# Patient Record
Sex: FEMALE | Race: WHITE | NOT HISPANIC OR LATINO | Employment: OTHER | URBAN - NONMETROPOLITAN AREA
[De-identification: names, ages, dates, MRNs, and addresses within clinical notes are randomized per-mention and may not be internally consistent; named-entity substitution may affect disease eponyms.]

---

## 2023-03-27 PROBLEM — K21.9 GERD (GASTROESOPHAGEAL REFLUX DISEASE): Status: ACTIVE | Noted: 2023-03-27

## 2023-03-27 PROBLEM — E89.0 HYPOTHYROIDISM, POSTSURGICAL: Status: ACTIVE | Noted: 2023-03-27

## 2023-04-04 LAB
ALANINE AMINOTRANSFERASE (SGPT) (U/L) IN SER/PLAS: 12 U/L (ref 7–45)
ALBUMIN (G/DL) IN SER/PLAS: 3.8 G/DL (ref 3.4–5)
ALKALINE PHOSPHATASE (U/L) IN SER/PLAS: 78 U/L (ref 33–136)
ANION GAP IN SER/PLAS: 9 MMOL/L (ref 10–20)
ASPARTATE AMINOTRANSFERASE (SGOT) (U/L) IN SER/PLAS: 18 U/L (ref 9–39)
BASOPHILS (10*3/UL) IN BLOOD BY AUTOMATED COUNT: 0.04 X10E9/L (ref 0–0.1)
BASOPHILS/100 LEUKOCYTES IN BLOOD BY AUTOMATED COUNT: 0.7 % (ref 0–2)
BILIRUBIN TOTAL (MG/DL) IN SER/PLAS: 1.1 MG/DL (ref 0–1.2)
CALCIDIOL (25 OH VITAMIN D3) (NG/ML) IN SER/PLAS: 19 NG/ML
CALCIUM (MG/DL) IN SER/PLAS: 10 MG/DL (ref 8.6–10.3)
CARBON DIOXIDE, TOTAL (MMOL/L) IN SER/PLAS: 37 MMOL/L (ref 21–32)
CHLORIDE (MMOL/L) IN SER/PLAS: 97 MMOL/L (ref 98–107)
CHOLESTEROL (MG/DL) IN SER/PLAS: 158 MG/DL (ref 0–199)
CHOLESTEROL IN HDL (MG/DL) IN SER/PLAS: 31.2 MG/DL
CHOLESTEROL/HDL RATIO: 5.1
COBALAMIN (VITAMIN B12) (PG/ML) IN SER/PLAS: 389 PG/ML (ref 211–911)
CREATININE (MG/DL) IN SER/PLAS: 0.96 MG/DL (ref 0.5–1.05)
EOSINOPHILS (10*3/UL) IN BLOOD BY AUTOMATED COUNT: 0.18 X10E9/L (ref 0–0.7)
EOSINOPHILS/100 LEUKOCYTES IN BLOOD BY AUTOMATED COUNT: 3.2 % (ref 0–6)
ERYTHROCYTE DISTRIBUTION WIDTH (RATIO) BY AUTOMATED COUNT: 14.1 % (ref 11.5–14.5)
ERYTHROCYTE MEAN CORPUSCULAR HEMOGLOBIN CONCENTRATION (G/DL) BY AUTOMATED: 31.8 G/DL (ref 32–36)
ERYTHROCYTE MEAN CORPUSCULAR VOLUME (FL) BY AUTOMATED COUNT: 95 FL (ref 80–100)
ERYTHROCYTES (10*6/UL) IN BLOOD BY AUTOMATED COUNT: 3.78 X10E12/L (ref 4–5.2)
GFR FEMALE: 64 ML/MIN/1.73M2
GLUCOSE (MG/DL) IN SER/PLAS: 100 MG/DL (ref 74–99)
HEMATOCRIT (%) IN BLOOD BY AUTOMATED COUNT: 35.9 % (ref 36–46)
HEMOGLOBIN (G/DL) IN BLOOD: 11.4 G/DL (ref 12–16)
IMMATURE GRANULOCYTES/100 LEUKOCYTES IN BLOOD BY AUTOMATED COUNT: 0.4 % (ref 0–0.9)
LDL: 97 MG/DL (ref 0–99)
LEUKOCYTES (10*3/UL) IN BLOOD BY AUTOMATED COUNT: 5.7 X10E9/L (ref 4.4–11.3)
LYMPHOCYTES (10*3/UL) IN BLOOD BY AUTOMATED COUNT: 0.97 X10E9/L (ref 1.2–4.8)
LYMPHOCYTES/100 LEUKOCYTES IN BLOOD BY AUTOMATED COUNT: 17.1 % (ref 13–44)
MAGNESIUM (MG/DL) IN SER/PLAS: 1.51 MG/DL (ref 1.6–2.4)
MONOCYTES (10*3/UL) IN BLOOD BY AUTOMATED COUNT: 0.63 X10E9/L (ref 0.1–1)
MONOCYTES/100 LEUKOCYTES IN BLOOD BY AUTOMATED COUNT: 11.1 % (ref 2–10)
NEUTROPHILS (10*3/UL) IN BLOOD BY AUTOMATED COUNT: 3.84 X10E9/L (ref 1.2–7.7)
NEUTROPHILS/100 LEUKOCYTES IN BLOOD BY AUTOMATED COUNT: 67.5 % (ref 40–80)
PLATELETS (10*3/UL) IN BLOOD AUTOMATED COUNT: 174 X10E9/L (ref 150–450)
POTASSIUM (MMOL/L) IN SER/PLAS: 3.3 MMOL/L (ref 3.5–5.3)
PROTEIN TOTAL: 7 G/DL (ref 6.4–8.2)
SODIUM (MMOL/L) IN SER/PLAS: 140 MMOL/L (ref 136–145)
THYROTROPIN (MIU/L) IN SER/PLAS BY DETECTION LIMIT <= 0.05 MIU/L: 4.47 MIU/L (ref 0.44–3.98)
TRIGLYCERIDE (MG/DL) IN SER/PLAS: 149 MG/DL (ref 0–149)
UREA NITROGEN (MG/DL) IN SER/PLAS: 22 MG/DL (ref 6–23)
VLDL: 30 MG/DL (ref 0–40)

## 2023-04-21 PROBLEM — I10 ESSENTIAL (PRIMARY) HYPERTENSION: Status: ACTIVE | Noted: 2023-04-21

## 2023-04-21 PROBLEM — J44.1 COPD WITH EXACERBATION (MULTI): Status: ACTIVE | Noted: 2023-04-21

## 2023-04-21 PROBLEM — G47.33 OBSTRUCTIVE SLEEP APNEA: Status: ACTIVE | Noted: 2023-04-21

## 2023-04-21 PROBLEM — B00.9 HERPES SIMPLEX TYPE 1 INFECTION: Status: ACTIVE | Noted: 2023-04-21

## 2023-04-21 PROBLEM — E66.09 CLASS 2 OBESITY DUE TO EXCESS CALORIES IN ADULT: Status: ACTIVE | Noted: 2023-04-21

## 2023-04-21 PROBLEM — E66.812 CLASS 2 OBESITY DUE TO EXCESS CALORIES IN ADULT: Status: ACTIVE | Noted: 2023-04-21

## 2023-04-21 PROBLEM — N31.8 HYPERACTIVITY OF BLADDER: Status: ACTIVE | Noted: 2023-04-21

## 2023-04-21 PROBLEM — M79.89 SWELLING OF LOWER EXTREMITY: Status: ACTIVE | Noted: 2023-04-21

## 2023-04-21 PROBLEM — E55.9 VITAMIN D DEFICIENCY: Status: ACTIVE | Noted: 2023-04-21

## 2023-04-21 PROBLEM — I71.40 ABDOMINAL AORTIC ANEURYSM, WITHOUT RUPTURE, UNSPECIFIED (CMS-HCC): Status: ACTIVE | Noted: 2023-04-21

## 2023-04-21 PROBLEM — R19.7 DIARRHEA: Status: ACTIVE | Noted: 2023-04-21

## 2023-04-21 PROBLEM — E66.01 MORBID OBESITY (MULTI): Status: ACTIVE | Noted: 2023-04-21

## 2023-04-21 PROBLEM — R09.02 HYPOXIA: Status: ACTIVE | Noted: 2023-04-21

## 2023-04-21 PROBLEM — E87.6 HYPOKALEMIA: Status: ACTIVE | Noted: 2023-04-21

## 2023-04-21 PROBLEM — Z20.822 SUSPECTED COVID-19 VIRUS INFECTION: Status: ACTIVE | Noted: 2023-04-21

## 2023-04-21 PROBLEM — I89.0 LYMPHEDEMA OF BOTH LOWER EXTREMITIES: Status: ACTIVE | Noted: 2023-04-21

## 2023-04-21 PROBLEM — I50.9 CONGESTIVE HEART FAILURE (MULTI): Status: ACTIVE | Noted: 2023-04-21

## 2023-04-21 PROBLEM — M54.50 LOW BACK PAIN: Status: ACTIVE | Noted: 2023-04-21

## 2023-04-21 PROBLEM — G89.18 POST-OPERATIVE PAIN: Status: ACTIVE | Noted: 2023-04-21

## 2023-04-21 PROBLEM — E04.9 GOITER: Status: ACTIVE | Noted: 2023-04-21

## 2023-04-21 PROBLEM — J90 PLEURAL EFFUSION, LEFT: Status: ACTIVE | Noted: 2023-04-21

## 2023-04-21 PROBLEM — I10 BENIGN ESSENTIAL HYPERTENSION: Status: ACTIVE | Noted: 2023-04-21

## 2023-04-21 PROBLEM — D64.89 OTHER SPECIFIED ANEMIAS: Status: ACTIVE | Noted: 2023-04-21

## 2023-04-21 PROBLEM — R39.9 URINARY SYMPTOM OR SIGN: Status: ACTIVE | Noted: 2023-04-21

## 2023-04-21 PROBLEM — R60.0 EDEMA OF LEG: Status: ACTIVE | Noted: 2023-04-21

## 2023-04-21 PROBLEM — M54.9 BACK ARTHRALGIA: Status: ACTIVE | Noted: 2023-04-21

## 2023-04-21 PROBLEM — I50.9 CHF (NYHA CLASS III, ACC/AHA STAGE C) (MULTI): Status: ACTIVE | Noted: 2023-04-21

## 2023-04-21 PROBLEM — E78.00 HYPERCHOLESTEROLEMIA: Status: ACTIVE | Noted: 2023-04-21

## 2023-04-21 PROBLEM — J20.9 ACUTE BRONCHITIS: Status: ACTIVE | Noted: 2023-04-21

## 2023-04-21 PROBLEM — K29.90 GASTRODUODENITIS: Status: ACTIVE | Noted: 2023-04-21

## 2023-04-21 PROBLEM — G62.9 PERIPHERAL NEUROPATHY: Status: ACTIVE | Noted: 2023-04-21

## 2023-04-21 PROBLEM — J30.9 ALLERGIC RHINITIS: Status: ACTIVE | Noted: 2023-04-21

## 2023-04-21 PROBLEM — I48.91 A-FIB (MULTI): Status: ACTIVE | Noted: 2023-04-21

## 2023-04-21 PROBLEM — E04.1 COLLOID THYROID NODULE: Status: ACTIVE | Noted: 2023-04-21

## 2023-04-21 PROBLEM — J96.11 CHRONIC RESPIRATORY FAILURE WITH HYPOXIA (MULTI): Status: ACTIVE | Noted: 2023-04-21

## 2023-04-21 PROBLEM — G47.00 INSOMNIA: Status: ACTIVE | Noted: 2023-04-21

## 2023-04-21 PROBLEM — J32.9 SINUSITIS: Status: ACTIVE | Noted: 2023-04-21

## 2023-04-21 PROBLEM — Z98.890 HISTORY OF RADIOFREQUENCY ABLATION PROCEDURE FOR CARDIAC ARRHYTHMIA: Status: ACTIVE | Noted: 2023-04-21

## 2023-04-21 PROBLEM — H81.10 BPV (BENIGN POSITIONAL VERTIGO): Status: ACTIVE | Noted: 2023-04-21

## 2023-04-21 PROBLEM — J44.9 CHRONIC OBSTRUCTIVE PULMONARY DISEASE (MULTI): Status: ACTIVE | Noted: 2023-04-21

## 2023-04-21 PROBLEM — J18.9 PNEUMONIA: Status: ACTIVE | Noted: 2023-04-21

## 2023-04-21 RX ORDER — BUDESONIDE AND FORMOTEROL FUMARATE DIHYDRATE 160; 4.5 UG/1; UG/1
2 AEROSOL RESPIRATORY (INHALATION) 2 TIMES DAILY
COMMUNITY
Start: 2015-04-13 | End: 2024-05-06 | Stop reason: SDUPTHER

## 2023-04-21 RX ORDER — ATORVASTATIN CALCIUM 40 MG/1
40 TABLET, FILM COATED ORAL DAILY
COMMUNITY
End: 2023-05-16 | Stop reason: SDUPTHER

## 2023-04-21 RX ORDER — FUROSEMIDE 40 MG/1
1 TABLET ORAL 2 TIMES DAILY
COMMUNITY
Start: 2019-02-05 | End: 2023-12-01 | Stop reason: SDUPTHER

## 2023-04-21 RX ORDER — WARFARIN SODIUM 7.5 MG/1
7.5 TABLET ORAL
COMMUNITY
End: 2023-12-01

## 2023-04-21 RX ORDER — CHOLECALCIFEROL (VITAMIN D3) 125 MCG
125 CAPSULE ORAL DAILY
COMMUNITY
Start: 2020-10-13 | End: 2023-04-26 | Stop reason: SDUPTHER

## 2023-04-21 RX ORDER — ALBUTEROL SULFATE 90 UG/1
2 AEROSOL, METERED RESPIRATORY (INHALATION) EVERY 6 HOURS PRN
COMMUNITY
Start: 2013-12-03

## 2023-04-21 RX ORDER — POTASSIUM CHLORIDE 1500 MG/1
20 TABLET, EXTENDED RELEASE ORAL EVERY 12 HOURS
COMMUNITY
End: 2023-04-26 | Stop reason: SDUPTHER

## 2023-04-21 RX ORDER — FERROUS GLUCONATE 324(38)MG
1 TABLET ORAL DAILY
COMMUNITY
End: 2023-08-16

## 2023-04-21 RX ORDER — HYDRALAZINE HYDROCHLORIDE 100 MG/1
100 TABLET, FILM COATED ORAL 3 TIMES DAILY
COMMUNITY
End: 2023-07-20

## 2023-04-21 RX ORDER — AMITRIPTYLINE HYDROCHLORIDE 25 MG/1
25 TABLET, FILM COATED ORAL NIGHTLY
COMMUNITY
End: 2023-08-07

## 2023-04-21 RX ORDER — METOLAZONE 5 MG/1
5 TABLET ORAL
COMMUNITY
Start: 2020-08-13

## 2023-04-21 RX ORDER — DOCUSATE SODIUM 100 MG/1
100 CAPSULE, LIQUID FILLED ORAL 2 TIMES DAILY PRN
Status: ON HOLD | COMMUNITY
Start: 2020-05-28 | End: 2024-01-17 | Stop reason: ENTERED-IN-ERROR

## 2023-04-21 RX ORDER — LOSARTAN POTASSIUM 100 MG/1
100 TABLET ORAL DAILY
COMMUNITY
End: 2023-05-15 | Stop reason: SDUPTHER

## 2023-04-21 RX ORDER — MECLIZINE HYDROCHLORIDE 25 MG/1
25 TABLET ORAL NIGHTLY
COMMUNITY
End: 2023-08-21

## 2023-04-21 RX ORDER — CARVEDILOL 25 MG/1
25 TABLET ORAL
COMMUNITY
End: 2023-05-15 | Stop reason: SDUPTHER

## 2023-04-21 RX ORDER — ACETAMINOPHEN 325 MG/1
325 TABLET ORAL EVERY 4 HOURS PRN
COMMUNITY
Start: 2020-05-13

## 2023-04-21 RX ORDER — ASPIRIN 81 MG/1
81 TABLET ORAL DAILY
COMMUNITY
Start: 2020-05-28

## 2023-04-21 RX ORDER — OXYBUTYNIN CHLORIDE 15 MG/1
15 TABLET, EXTENDED RELEASE ORAL DAILY
COMMUNITY

## 2023-04-26 ENCOUNTER — OFFICE VISIT (OUTPATIENT)
Dept: PRIMARY CARE | Facility: CLINIC | Age: 68
End: 2023-04-26
Payer: MEDICARE

## 2023-04-26 VITALS
DIASTOLIC BLOOD PRESSURE: 80 MMHG | TEMPERATURE: 97 F | SYSTOLIC BLOOD PRESSURE: 136 MMHG | WEIGHT: 293 LBS | OXYGEN SATURATION: 98 % | HEIGHT: 67 IN | BODY MASS INDEX: 45.99 KG/M2 | HEART RATE: 75 BPM

## 2023-04-26 DIAGNOSIS — E66.01 MORBID OBESITY WITH BMI OF 50.0-59.9, ADULT (MULTI): ICD-10-CM

## 2023-04-26 DIAGNOSIS — J96.11 CHRONIC RESPIRATORY FAILURE WITH HYPOXIA (MULTI): ICD-10-CM

## 2023-04-26 DIAGNOSIS — E55.9 VITAMIN D DEFICIENCY: ICD-10-CM

## 2023-04-26 DIAGNOSIS — R53.83 OTHER FATIGUE: ICD-10-CM

## 2023-04-26 DIAGNOSIS — G47.33 OBSTRUCTIVE SLEEP APNEA: ICD-10-CM

## 2023-04-26 DIAGNOSIS — I71.40 ABDOMINAL AORTIC ANEURYSM (AAA) WITHOUT RUPTURE, UNSPECIFIED PART (CMS-HCC): ICD-10-CM

## 2023-04-26 DIAGNOSIS — J44.9 CHRONIC OBSTRUCTIVE PULMONARY DISEASE, UNSPECIFIED COPD TYPE (MULTI): ICD-10-CM

## 2023-04-26 DIAGNOSIS — I50.810 RIGHT-SIDED CONGESTIVE HEART FAILURE, UNSPECIFIED HF CHRONICITY (MULTI): ICD-10-CM

## 2023-04-26 DIAGNOSIS — E83.42 HYPOMAGNESEMIA: ICD-10-CM

## 2023-04-26 DIAGNOSIS — E87.6 HYPOKALEMIA: ICD-10-CM

## 2023-04-26 DIAGNOSIS — E89.0 HYPOTHYROIDISM, POSTSURGICAL: ICD-10-CM

## 2023-04-26 DIAGNOSIS — Z00.00 ROUTINE GENERAL MEDICAL EXAMINATION AT HEALTH CARE FACILITY: Primary | ICD-10-CM

## 2023-04-26 DIAGNOSIS — I48.11 LONGSTANDING PERSISTENT ATRIAL FIBRILLATION (MULTI): ICD-10-CM

## 2023-04-26 DIAGNOSIS — I10 ESSENTIAL (PRIMARY) HYPERTENSION: ICD-10-CM

## 2023-04-26 DIAGNOSIS — Z13.89 SCREENING FOR MULTIPLE CONDITIONS: ICD-10-CM

## 2023-04-26 DIAGNOSIS — E78.00 HYPERCHOLESTEROLEMIA: ICD-10-CM

## 2023-04-26 PROBLEM — R19.7 DIARRHEA: Status: RESOLVED | Noted: 2023-04-21 | Resolved: 2023-04-26

## 2023-04-26 PROCEDURE — G0439 PPPS, SUBSEQ VISIT: HCPCS | Performed by: INTERNAL MEDICINE

## 2023-04-26 PROCEDURE — 1036F TOBACCO NON-USER: CPT | Performed by: INTERNAL MEDICINE

## 2023-04-26 PROCEDURE — G0447 BEHAVIOR COUNSEL OBESITY 15M: HCPCS | Performed by: INTERNAL MEDICINE

## 2023-04-26 PROCEDURE — 1157F ADVNC CARE PLAN IN RCRD: CPT | Performed by: INTERNAL MEDICINE

## 2023-04-26 PROCEDURE — 1159F MED LIST DOCD IN RCRD: CPT | Performed by: INTERNAL MEDICINE

## 2023-04-26 PROCEDURE — 3008F BODY MASS INDEX DOCD: CPT | Performed by: INTERNAL MEDICINE

## 2023-04-26 PROCEDURE — 1170F FXNL STATUS ASSESSED: CPT | Performed by: INTERNAL MEDICINE

## 2023-04-26 PROCEDURE — G0444 DEPRESSION SCREEN ANNUAL: HCPCS | Performed by: INTERNAL MEDICINE

## 2023-04-26 PROCEDURE — 3075F SYST BP GE 130 - 139MM HG: CPT | Performed by: INTERNAL MEDICINE

## 2023-04-26 PROCEDURE — 99214 OFFICE O/P EST MOD 30 MIN: CPT | Performed by: INTERNAL MEDICINE

## 2023-04-26 PROCEDURE — 1160F RVW MEDS BY RX/DR IN RCRD: CPT | Performed by: INTERNAL MEDICINE

## 2023-04-26 PROCEDURE — 3079F DIAST BP 80-89 MM HG: CPT | Performed by: INTERNAL MEDICINE

## 2023-04-26 RX ORDER — CALCIUM CARBONATE/VITAMIN D3 500-10/5ML
400 LIQUID (ML) ORAL
Qty: 180 CAPSULE | Refills: 3 | Status: SHIPPED | OUTPATIENT
Start: 2023-04-26 | End: 2023-07-25

## 2023-04-26 RX ORDER — POTASSIUM CHLORIDE 1500 MG/1
20 TABLET, EXTENDED RELEASE ORAL EVERY 12 HOURS
Qty: 60 TABLET | Refills: 2 | Status: SHIPPED | OUTPATIENT
Start: 2023-04-26 | End: 2024-01-22

## 2023-04-26 RX ORDER — CHOLECALCIFEROL (VITAMIN D3) 125 MCG
125 CAPSULE ORAL DAILY
Qty: 90 CAPSULE | Refills: 3 | Status: SHIPPED | OUTPATIENT
Start: 2023-04-26 | End: 2024-05-01

## 2023-04-26 ASSESSMENT — ENCOUNTER SYMPTOMS
HEADACHES: 0
WHEEZING: 0
PALPITATIONS: 0
OCCASIONAL FEELINGS OF UNSTEADINESS: 0
DIZZINESS: 0
FATIGUE: 0
FEVER: 0
ARTHRALGIAS: 0
BRUISES/BLEEDS EASILY: 0
LOSS OF SENSATION IN FEET: 0
COUGH: 0
ABDOMINAL PAIN: 0
DIARRHEA: 0
DIFFICULTY URINATING: 0
SINUS PAIN: 0
DEPRESSION: 0
UNEXPECTED WEIGHT CHANGE: 0
BLOOD IN STOOL: 0
SORE THROAT: 0

## 2023-04-26 ASSESSMENT — ACTIVITIES OF DAILY LIVING (ADL)
BATHING: INDEPENDENT
MANAGING_FINANCES: NEEDS ASSISTANCE
DRESSING: INDEPENDENT
TAKING_MEDICATION: INDEPENDENT
GROCERY_SHOPPING: NEEDS ASSISTANCE
DOING_HOUSEWORK: INDEPENDENT

## 2023-04-26 ASSESSMENT — PATIENT HEALTH QUESTIONNAIRE - PHQ9
1. LITTLE INTEREST OR PLEASURE IN DOING THINGS: NOT AT ALL
2. FEELING DOWN, DEPRESSED OR HOPELESS: NOT AT ALL
SUM OF ALL RESPONSES TO PHQ9 QUESTIONS 1 AND 2: 0

## 2023-04-26 NOTE — PROGRESS NOTES
Subjective   Reason for Visit: Emma Villela is an 68 y.o. female here for a Medicare Wellness visit.          Reviewed all medications by prescribing practitioner or clinical pharmacist (such as prescriptions, OTCs, herbal therapies and supplements) and documented in the medical record.    - Screening for depression  I spent 15 minutes obtaining and discussing depression screening using PHQ-2 questions with results documented in the chart.  - Morbid obesity  I spent >15minutes minutes face to face with individial providing recommendations for nutrition choices and exercise plan to help achieve weight reduction.  -Mammogram up-to-date  -Vaccinations patient needs to proceed with Shingrix vaccine new prescription provided today  - Screening colonoscopy up-to-date  -  Blood work reviewed  - Hypothyroid seen by endocrinology now takes total to 250 mcg's daily follow-up thyroid function test in 3 months  - Hypomagnesemia start a magnesium tablet daily  - Vitamin D deficiency need to start on vitamin D 5000 daily  - Hypokalemia increase potassium to take twice a day  -CHF compensated  - History of aortic aneurysm compensated controlled no change  -Chronic respiratory failure and COPD continues 2 L oxygen continue with current inhaler recent pulmonary function test no change          Patient Care Team:  Ti Nolan MD as PCP - General  [facility] Nolan [facility]     Review of Systems   Constitutional:  Negative for fatigue, fever and unexpected weight change.   HENT:  Negative for congestion, ear discharge, ear pain, mouth sores, sinus pain and sore throat.    Eyes:  Negative for visual disturbance.   Respiratory:  Negative for cough and wheezing.    Cardiovascular:  Negative for chest pain, palpitations and leg swelling.   Gastrointestinal:  Negative for abdominal pain, blood in stool and diarrhea.   Genitourinary:  Negative for difficulty urinating.   Musculoskeletal:  Negative for  "arthralgias.   Skin:  Negative for rash.   Neurological:  Negative for dizziness and headaches.   Hematological:  Does not bruise/bleed easily.   Psychiatric/Behavioral:  Negative for behavioral problems.    All other systems reviewed and are negative.      Objective   Vitals:  /80   Pulse 75   Temp 36.1 °C (97 °F)   Ht 1.689 m (5' 6.5\")   Wt (!) 151 kg (332 lb)   SpO2 98%   BMI 52.78 kg/m²     Lab Results   Component Value Date    WBC 5.7 04/04/2023    HGB 11.4 (L) 04/04/2023    HCT 35.9 (L) 04/04/2023     04/04/2023    CHOL 158 04/04/2023    TRIG 149 04/04/2023    HDL 31.2 (A) 04/04/2023    ALT 12 04/04/2023    AST 18 04/04/2023     04/04/2023    K 3.3 (L) 04/04/2023    CL 97 (L) 04/04/2023    CREATININE 0.96 04/04/2023    BUN 22 04/04/2023    CO2 37 (H) 04/04/2023    TSH 4.47 (H) 04/04/2023    INR 1.6 (H) 06/22/2020     par   Physical Exam  Vitals and nursing note reviewed.   Constitutional:       Appearance: Normal appearance. She is obese.   HENT:      Head: Normocephalic.      Nose: Nose normal.   Eyes:      Conjunctiva/sclera: Conjunctivae normal.      Pupils: Pupils are equal, round, and reactive to light.   Cardiovascular:      Rate and Rhythm: Regular rhythm.   Pulmonary:      Effort: Pulmonary effort is normal.      Breath sounds: Normal breath sounds.   Abdominal:      General: Abdomen is flat.      Palpations: Abdomen is soft.   Musculoskeletal:      Cervical back: Neck supple.      Right lower leg: Edema present.      Left lower leg: Edema present.   Skin:     General: Skin is warm.   Neurological:      General: No focal deficit present.      Mental Status: She is oriented to person, place, and time.   Psychiatric:         Mood and Affect: Mood normal.         Assessment/Plan   Problem List Items Addressed This Visit          Nervous    Obstructive sleep apnea       Respiratory    Chronic obstructive pulmonary disease (CMS/HCC)    Chronic respiratory failure with hypoxia " (CMS/[facility])       Circulatory    A-fib (CMS/[facility])    Relevant Medications    carvedilol (Coreg) 25 mg tablet    Abdominal aortic aneurysm, without rupture, unspecified (CMS/HCC)    Essential (primary) hypertension    Congestive heart failure (CMS/[facility])    Relevant Medications    carvedilol (Coreg) 25 mg tablet       Endocrine/Metabolic    Hypothyroidism, postsurgical    Vitamin D deficiency    Relevant Medications    cholecalciferol (Vitamin D-3) 125 MCG (5000 UT) capsule       Other    Hypercholesterolemia    Hypokalemia    Relevant Medications    potassium chloride CR (K-Tab) 20 mEq ER tablet    Hypomagnesemia    Relevant Medications    magnesium oxide 400 mg magnesium capsule     Other Visit Diagnoses       Routine general medical examination at health care facility    -  Primary    Relevant Medications    zoster vaccine-recombinant adjuvanted (Shingrix) 50 mcg/0.5 mL vaccine    Other Relevant Orders    1 Year Follow Up In Primary Care - Wellness Exam    Morbid obesity with BMI of 50.0-59.9, adult (CMS/[facility])        Screening for multiple conditions        Other fatigue        Relevant Orders    TSH with reflex to Free T4 if abnormal          - Screening for depression  I spent 15 minutes obtaining and discussing depression screening using PHQ-2 questions with results documented in the chart.  - Morbid obesity  I spent >15minutes minutes face to face with individial providing recommendations for nutrition choices and exercise plan to help achieve weight reduction.  -Mammogram up-to-date  -Vaccinations patient needs to proceed with Shingrix vaccine new prescription provided today  - Screening colonoscopy up-to-date  -  Blood work reviewed  - Hypothyroid seen by endocrinology now takes total to 250 mcg's daily follow-up thyroid function test in 3 months  - Hypomagnesemia start a magnesium tablet daily  - Vitamin D deficiency need to start on vitamin D 5000 daily  - Hypokalemia increase potassium to take twice a day  -CHF  compensated  - History of aortic aneurysm compensated controlled no change  -Chronic respiratory failure and COPD continues 2 L oxygen continue with current inhaler recent pulmonary function test no change

## 2023-04-28 ENCOUNTER — TELEPHONE (OUTPATIENT)
Dept: PRIMARY CARE | Facility: CLINIC | Age: 68
End: 2023-04-28
Payer: MEDICARE

## 2023-04-28 DIAGNOSIS — E87.6 HYPOKALEMIA: ICD-10-CM

## 2023-05-15 DIAGNOSIS — E78.00 HYPERCHOLESTEROLEMIA: ICD-10-CM

## 2023-05-15 DIAGNOSIS — I10 BENIGN ESSENTIAL HYPERTENSION: ICD-10-CM

## 2023-05-15 DIAGNOSIS — E89.0 HYPOTHYROIDISM, POSTSURGICAL: ICD-10-CM

## 2023-05-15 RX ORDER — LOSARTAN POTASSIUM 100 MG/1
TABLET ORAL
Qty: 90 TABLET | Refills: 0 | Status: SHIPPED | OUTPATIENT
Start: 2023-05-15 | End: 2023-08-11

## 2023-05-15 RX ORDER — LEVOTHYROXINE SODIUM 50 UG/1
TABLET ORAL
Qty: 90 TABLET | Refills: 0 | Status: SHIPPED | OUTPATIENT
Start: 2023-05-15 | End: 2023-08-11

## 2023-05-15 RX ORDER — CARVEDILOL 25 MG/1
TABLET ORAL
Qty: 180 TABLET | Refills: 0 | Status: SHIPPED | OUTPATIENT
Start: 2023-05-15 | End: 2023-08-11

## 2023-05-15 RX ORDER — LEVOTHYROXINE SODIUM 50 UG/1
50 TABLET ORAL
COMMUNITY
End: 2023-07-26 | Stop reason: SDUPTHER

## 2023-05-16 RX ORDER — ATORVASTATIN CALCIUM 40 MG/1
TABLET, FILM COATED ORAL
Qty: 90 TABLET | Refills: 0 | Status: SHIPPED | OUTPATIENT
Start: 2023-05-16 | End: 2023-08-16

## 2023-06-27 DIAGNOSIS — K21.9 GASTROESOPHAGEAL REFLUX DISEASE, UNSPECIFIED WHETHER ESOPHAGITIS PRESENT: ICD-10-CM

## 2023-06-27 DIAGNOSIS — E89.0 HYPOTHYROIDISM, POSTSURGICAL: ICD-10-CM

## 2023-06-27 RX ORDER — PANTOPRAZOLE SODIUM 40 MG/1
TABLET, DELAYED RELEASE ORAL
Qty: 90 TABLET | Refills: 0 | Status: SHIPPED | OUTPATIENT
Start: 2023-06-27 | End: 2023-09-25

## 2023-06-27 RX ORDER — LEVOTHYROXINE SODIUM 200 UG/1
TABLET ORAL
Qty: 90 TABLET | Refills: 0 | Status: SHIPPED | OUTPATIENT
Start: 2023-06-27 | End: 2023-09-25

## 2023-07-20 ENCOUNTER — LAB (OUTPATIENT)
Dept: LAB | Facility: LAB | Age: 68
End: 2023-07-20
Payer: MEDICARE

## 2023-07-20 DIAGNOSIS — I10 BENIGN ESSENTIAL HYPERTENSION: ICD-10-CM

## 2023-07-20 DIAGNOSIS — R53.83 OTHER FATIGUE: ICD-10-CM

## 2023-07-20 LAB
THYROTROPIN (MIU/L) IN SER/PLAS BY DETECTION LIMIT <= 0.05 MIU/L: 4.75 MIU/L (ref 0.44–3.98)
THYROXINE (T4) FREE (NG/DL) IN SER/PLAS: 1.15 NG/DL (ref 0.61–1.12)

## 2023-07-20 PROCEDURE — 84443 ASSAY THYROID STIM HORMONE: CPT

## 2023-07-20 PROCEDURE — 84439 ASSAY OF FREE THYROXINE: CPT

## 2023-07-20 PROCEDURE — 36415 COLL VENOUS BLD VENIPUNCTURE: CPT

## 2023-07-20 RX ORDER — HYDRALAZINE HYDROCHLORIDE 100 MG/1
100 TABLET, FILM COATED ORAL 3 TIMES DAILY
Qty: 270 TABLET | Refills: 0 | Status: SHIPPED | OUTPATIENT
Start: 2023-07-20 | End: 2023-12-11 | Stop reason: SDUPTHER

## 2023-07-26 ENCOUNTER — OFFICE VISIT (OUTPATIENT)
Dept: PRIMARY CARE | Facility: CLINIC | Age: 68
End: 2023-07-26
Payer: MEDICARE

## 2023-07-26 VITALS
HEART RATE: 74 BPM | DIASTOLIC BLOOD PRESSURE: 82 MMHG | OXYGEN SATURATION: 96 % | WEIGHT: 293 LBS | SYSTOLIC BLOOD PRESSURE: 134 MMHG | BODY MASS INDEX: 52.22 KG/M2 | TEMPERATURE: 96.8 F

## 2023-07-26 DIAGNOSIS — J96.11 CHRONIC RESPIRATORY FAILURE WITH HYPOXIA (MULTI): ICD-10-CM

## 2023-07-26 DIAGNOSIS — J44.9 CHRONIC OBSTRUCTIVE PULMONARY DISEASE, UNSPECIFIED COPD TYPE (MULTI): ICD-10-CM

## 2023-07-26 DIAGNOSIS — E78.00 HYPERCHOLESTEROLEMIA: ICD-10-CM

## 2023-07-26 DIAGNOSIS — E87.6 HYPOKALEMIA: Primary | ICD-10-CM

## 2023-07-26 DIAGNOSIS — I48.11 LONGSTANDING PERSISTENT ATRIAL FIBRILLATION (MULTI): ICD-10-CM

## 2023-07-26 PROCEDURE — 99214 OFFICE O/P EST MOD 30 MIN: CPT | Performed by: INTERNAL MEDICINE

## 2023-07-26 PROCEDURE — 3075F SYST BP GE 130 - 139MM HG: CPT | Performed by: INTERNAL MEDICINE

## 2023-07-26 PROCEDURE — 1036F TOBACCO NON-USER: CPT | Performed by: INTERNAL MEDICINE

## 2023-07-26 PROCEDURE — 1157F ADVNC CARE PLAN IN RCRD: CPT | Performed by: INTERNAL MEDICINE

## 2023-07-26 PROCEDURE — 3079F DIAST BP 80-89 MM HG: CPT | Performed by: INTERNAL MEDICINE

## 2023-07-26 PROCEDURE — 1159F MED LIST DOCD IN RCRD: CPT | Performed by: INTERNAL MEDICINE

## 2023-07-26 PROCEDURE — 3008F BODY MASS INDEX DOCD: CPT | Performed by: INTERNAL MEDICINE

## 2023-07-26 PROCEDURE — 1160F RVW MEDS BY RX/DR IN RCRD: CPT | Performed by: INTERNAL MEDICINE

## 2023-07-26 PROCEDURE — 1126F AMNT PAIN NOTED NONE PRSNT: CPT | Performed by: INTERNAL MEDICINE

## 2023-07-26 ASSESSMENT — ENCOUNTER SYMPTOMS
UNEXPECTED WEIGHT CHANGE: 0
FEVER: 0
COUGH: 0
WHEEZING: 0
HEADACHES: 0
PALPITATIONS: 0
DIFFICULTY URINATING: 0
ARTHRALGIAS: 0
BLOOD IN STOOL: 0
SORE THROAT: 0
FATIGUE: 0
BRUISES/BLEEDS EASILY: 0
DIARRHEA: 0
ABDOMINAL PAIN: 0
HYPERTENSION: 1
DIZZINESS: 0
SINUS PAIN: 0

## 2023-07-26 NOTE — PROGRESS NOTES
"Subjective   Patient ID: Emma Villela is a 68 y.o. female who presents for Hyperlipidemia, Hypertension, Hypothyroidism, and Results (BW).      - Hypothyroid seen by endocrinology now takes total to 250 mcg's daily follow-up thyroid function test in 3 months  - Hypomagnesemia start a magnesium tablet daily  - Vitamin D deficiency need to start on vitamin D 5000 daily  - Hypokalemia continue with potassium 40 mg twice a day follow-up BMP for further recommendation  -CHF compensated continue low-salt diet  - History of aortic aneurysm compensated controlled no change follow-up vascular surgery  -Chronic respiratory failure and COPD continues 2 L oxygen continue with current inhaler recent pulmonary function test no change            Hyperlipidemia  Pertinent negatives include no chest pain.   Hypertension  Pertinent negatives include no chest pain, headaches or palpitations.          Review of Systems   Constitutional:  Negative for fatigue, fever and unexpected weight change.   HENT:  Negative for congestion, ear discharge, ear pain, mouth sores, sinus pain and sore throat.    Eyes:  Negative for visual disturbance.   Respiratory:  Negative for cough and wheezing.    Cardiovascular:  Negative for chest pain, palpitations and leg swelling.   Gastrointestinal:  Negative for abdominal pain, blood in stool and diarrhea.   Genitourinary:  Negative for difficulty urinating.   Musculoskeletal:  Negative for arthralgias.   Skin:  Negative for rash.   Neurological:  Negative for dizziness and headaches.   Hematological:  Does not bruise/bleed easily.   Psychiatric/Behavioral:  Negative for behavioral problems.    All other systems reviewed and are negative.      Objective   No results found for: \"HGBA1C\"   /82   Pulse 74   Temp 36 °C (96.8 °F)   Wt (!) 151 kg (333 lb 6.4 oz)   SpO2 96%   BMI 52.22 kg/m²   Lab Results   Component Value Date    WBC 5.7 04/04/2023    HGB 11.4 (L) 04/04/2023    HCT 35.9 (L) " 04/04/2023     04/04/2023    CHOL 158 04/04/2023    TRIG 149 04/04/2023    HDL 31.2 (A) 04/04/2023    ALT 12 04/04/2023    AST 18 04/04/2023     04/04/2023    K 3.3 (L) 04/04/2023    CL 97 (L) 04/04/2023    CREATININE 0.96 04/04/2023    BUN 22 04/04/2023    CO2 37 (H) 04/04/2023    TSH 4.75 (H) 07/20/2023    INR 1.6 (H) 06/22/2020     par   Physical Exam  Vitals and nursing note reviewed.   Constitutional:       Appearance: Normal appearance.   HENT:      Head: Normocephalic.      Nose: Nose normal.   Eyes:      Conjunctiva/sclera: Conjunctivae normal.      Pupils: Pupils are equal, round, and reactive to light.   Cardiovascular:      Rate and Rhythm: Regular rhythm.   Pulmonary:      Effort: Pulmonary effort is normal.      Breath sounds: Normal breath sounds.   Abdominal:      General: Abdomen is flat.      Palpations: Abdomen is soft.   Musculoskeletal:      Cervical back: Neck supple.   Skin:     General: Skin is warm.   Neurological:      General: No focal deficit present.      Mental Status: She is oriented to person, place, and time.   Psychiatric:         Mood and Affect: Mood normal.         Assessment/Plan   Emma was seen today for hyperlipidemia, hypertension, hypothyroidism and results.  Diagnoses and all orders for this visit:  Hypokalemia (Primary)  -     Basic metabolic panel; Future  Chronic obstructive pulmonary disease, unspecified COPD type (CMS/HCC)  Longstanding persistent atrial fibrillation (CMS/HCC)  Hypercholesterolemia  Chronic respiratory failure with hypoxia (CMS/HCC)  Other orders  -     Follow Up In Primary Care - Established; Future   - Hypothyroid seen by endocrinology now takes total to 250 mcg's daily follow-up thyroid function test in 3 months  - Hypomagnesemia start a magnesium tablet daily  - Vitamin D deficiency need to start on vitamin D 5000 daily  - Hypokalemia continue with potassium 40 mg twice a day follow-up BMP for further recommendation  -CHF compensated  continue low-salt diet  - History of aortic aneurysm compensated controlled no change follow-up vascular surgery  -Chronic respiratory failure and COPD continues 2 L oxygen continue with current inhaler recent pulmonary function test no change

## 2023-08-07 DIAGNOSIS — K29.90 GASTRODUODENITIS: Primary | ICD-10-CM

## 2023-08-07 RX ORDER — AMITRIPTYLINE HYDROCHLORIDE 25 MG/1
25 TABLET, FILM COATED ORAL NIGHTLY
Qty: 90 TABLET | Refills: 1 | Status: SHIPPED | OUTPATIENT
Start: 2023-08-07 | End: 2024-01-29

## 2023-08-11 DIAGNOSIS — I10 BENIGN ESSENTIAL HYPERTENSION: ICD-10-CM

## 2023-08-11 DIAGNOSIS — E89.0 HYPOTHYROIDISM, POSTSURGICAL: ICD-10-CM

## 2023-08-11 RX ORDER — CARVEDILOL 25 MG/1
25 TABLET ORAL
Qty: 180 TABLET | Refills: 0 | Status: SHIPPED | OUTPATIENT
Start: 2023-08-11 | End: 2023-11-28

## 2023-08-11 RX ORDER — LEVOTHYROXINE SODIUM 50 UG/1
TABLET ORAL
Qty: 90 TABLET | Refills: 0 | Status: SHIPPED | OUTPATIENT
Start: 2023-08-11 | End: 2023-11-10 | Stop reason: SDUPTHER

## 2023-08-11 RX ORDER — LOSARTAN POTASSIUM 100 MG/1
TABLET ORAL
Qty: 90 TABLET | Refills: 0 | Status: SHIPPED | OUTPATIENT
Start: 2023-08-11 | End: 2023-11-10 | Stop reason: SDUPTHER

## 2023-08-15 DIAGNOSIS — E78.00 HYPERCHOLESTEROLEMIA: ICD-10-CM

## 2023-08-15 DIAGNOSIS — D64.89 ANEMIA DUE TO OTHER CAUSE, NOT CLASSIFIED: ICD-10-CM

## 2023-08-16 RX ORDER — FERROUS GLUCONATE 324(38)MG
1 TABLET ORAL DAILY
Qty: 90 TABLET | Refills: 0 | Status: SHIPPED | OUTPATIENT
Start: 2023-08-16 | End: 2023-11-28

## 2023-08-16 RX ORDER — ATORVASTATIN CALCIUM 40 MG/1
TABLET, FILM COATED ORAL
Qty: 90 TABLET | Refills: 0 | Status: SHIPPED | OUTPATIENT
Start: 2023-08-16 | End: 2023-11-10 | Stop reason: SDUPTHER

## 2023-08-21 DIAGNOSIS — H81.10 BENIGN PAROXYSMAL POSITIONAL VERTIGO, UNSPECIFIED LATERALITY: ICD-10-CM

## 2023-08-21 RX ORDER — MECLIZINE HYDROCHLORIDE 25 MG/1
25 TABLET ORAL NIGHTLY
Qty: 30 TABLET | Refills: 2 | Status: SHIPPED | OUTPATIENT
Start: 2023-08-21

## 2023-08-30 RX ORDER — LANOLIN ALCOHOL/MO/W.PET/CERES
1 CREAM (GRAM) TOPICAL
COMMUNITY
Start: 2023-07-24 | End: 2024-05-03 | Stop reason: SDUPTHER

## 2023-09-11 DIAGNOSIS — J44.1 COPD WITH EXACERBATION (MULTI): ICD-10-CM

## 2023-09-25 DIAGNOSIS — I10 HYPERTENSION, UNSPECIFIED TYPE: ICD-10-CM

## 2023-09-25 DIAGNOSIS — E89.0 HYPOTHYROIDISM, POSTSURGICAL: ICD-10-CM

## 2023-09-25 DIAGNOSIS — K21.9 GASTROESOPHAGEAL REFLUX DISEASE, UNSPECIFIED WHETHER ESOPHAGITIS PRESENT: ICD-10-CM

## 2023-09-25 RX ORDER — ISOSORBIDE MONONITRATE 120 MG/1
120 TABLET, EXTENDED RELEASE ORAL DAILY
Qty: 90 TABLET | Refills: 1 | Status: SHIPPED | OUTPATIENT
Start: 2023-09-25 | End: 2024-02-28 | Stop reason: ALTCHOICE

## 2023-09-25 RX ORDER — LEVOTHYROXINE SODIUM 200 UG/1
TABLET ORAL
Qty: 90 TABLET | Refills: 1 | Status: SHIPPED | OUTPATIENT
Start: 2023-09-25 | End: 2024-03-21

## 2023-09-25 RX ORDER — PANTOPRAZOLE SODIUM 40 MG/1
TABLET, DELAYED RELEASE ORAL
Qty: 90 TABLET | Refills: 1 | Status: SHIPPED | OUTPATIENT
Start: 2023-09-25 | End: 2024-03-21

## 2023-10-06 ENCOUNTER — APPOINTMENT (OUTPATIENT)
Dept: VASCULAR SURGERY | Facility: HOSPITAL | Age: 68
End: 2023-10-06
Payer: MEDICARE

## 2023-10-06 ENCOUNTER — HOSPITAL ENCOUNTER (OUTPATIENT)
Dept: VASCULAR MEDICINE | Facility: CLINIC | Age: 68
Discharge: HOME | End: 2023-10-06
Payer: MEDICARE

## 2023-10-06 ENCOUNTER — TELEMEDICINE (OUTPATIENT)
Dept: VASCULAR SURGERY | Facility: HOSPITAL | Age: 68
End: 2023-10-06
Payer: MEDICARE

## 2023-10-06 DIAGNOSIS — I71.40 ABDOMINAL AORTIC ANEURYSM, WITHOUT RUPTURE, UNSPECIFIED (CMS-HCC): ICD-10-CM

## 2023-10-06 DIAGNOSIS — I71.43 INFRARENAL ABDOMINAL AORTIC ANEURYSM (AAA) WITHOUT RUPTURE (CMS-HCC): Primary | ICD-10-CM

## 2023-10-06 PROCEDURE — 93978 VASCULAR STUDY: CPT | Performed by: INTERNAL MEDICINE

## 2023-10-06 PROCEDURE — 93978 VASCULAR STUDY: CPT

## 2023-10-06 PROCEDURE — 99212 OFFICE O/P EST SF 10 MIN: CPT | Mod: 95 | Performed by: SURGERY

## 2023-10-06 PROCEDURE — 99212 OFFICE O/P EST SF 10 MIN: CPT | Performed by: SURGERY

## 2023-10-06 NOTE — PROGRESS NOTES
I had a telephone visit with this patient for follow-up of endovascular repair of abdominal aortic aneurysm that she underwent on May 16, 2016 by Dr. Mansoor Aleman.  The aneurysm sac continues to expand for which she underwent  ligation of lumbar vessels and sac evacuation by Dr. Pearce on May 19, 2020.  However the aneurysm sac was noted to be increasing continuously.  On today's examination her aortic sac measures 6.2 x 7.4 cm.    She denies any abdominal or back pain that may be attributable to the aneurysm.    She is morbidly obese with body mass index of 52.2.    I recommended that she be evaluated by a CTA of abdomen and pelvis for us to better assess the size of the aneurysm and the presence or absence of endoleak.    She was also counseled to engage in weight loss program in preparation of possible open conversion.    She is to call my office when CT scan has been performed.

## 2023-10-09 DIAGNOSIS — I71.40 ABDOMINAL AORTIC ANEURYSM (AAA) WITHOUT RUPTURE, UNSPECIFIED PART (CMS-HCC): Primary | ICD-10-CM

## 2023-10-17 ENCOUNTER — APPOINTMENT (OUTPATIENT)
Dept: RADIOLOGY | Facility: HOSPITAL | Age: 68
End: 2023-10-17
Payer: MEDICARE

## 2023-10-18 ENCOUNTER — LAB (OUTPATIENT)
Dept: LAB | Facility: LAB | Age: 68
End: 2023-10-18
Payer: MEDICARE

## 2023-10-18 DIAGNOSIS — I71.40 ABDOMINAL AORTIC ANEURYSM (AAA) WITHOUT RUPTURE, UNSPECIFIED PART (CMS-HCC): ICD-10-CM

## 2023-10-18 LAB
CREAT SERPL-MCNC: 0.85 MG/DL (ref 0.5–1.05)
GFR SERPL CREATININE-BSD FRML MDRD: 74 ML/MIN/1.73M*2

## 2023-10-18 PROCEDURE — 36415 COLL VENOUS BLD VENIPUNCTURE: CPT

## 2023-10-19 ENCOUNTER — TELEPHONE (OUTPATIENT)
Dept: PHARMACY | Facility: HOSPITAL | Age: 68
End: 2023-10-19
Payer: MEDICARE

## 2023-10-19 ENCOUNTER — APPOINTMENT (OUTPATIENT)
Dept: RADIOLOGY | Facility: HOSPITAL | Age: 68
End: 2023-10-19
Payer: MEDICARE

## 2023-10-19 NOTE — TELEPHONE ENCOUNTER
SUBJECTIVE    Emma Villela is a 68 y.o. female who is enrolled in the [facility] for anticoagulation management.     Referring Physician: Dr. Ti Nolan   INR Goal: 2.0-3.0  Anticoagulation Medication: warfarin  Indication: atrial fibrillation    Bleeding signs/symptoms: no  Bruising: no  Major bleeding event: no  Thrombosis signs/symptoms: no  Thromboembolic event: no  Missed doses: no  Extra doses: no  Medication changes: no  Dietary changes: less greens than normal   Change in health: no  Change in activity: no  Alcohol: no  Other concerns: None     Upcoming Surgeries:  Does the Patient Have any upcoming surgeries that require interruption in anticoagulation therapy? no  Does the patient require bridging? no    OBJECTIVE  INR Today: 3.6  Previous INR: 3.0        ASSESSMENT AND PLAN     Tablet strength: 7.5 mg   Current dose: 1 tablet by mouth daily  Weekly dose: 52.5 mg/week     Current INR is Supratherapeutic. Previous INR was therapeutic at 3.0. Dietary changes over the past week likely contributing to supratherapeutic level. Patient plans to resume normal intake of vitamin K. No refills needed at this time.     Plan:  Hold warfarin x1 dose, then resume previous warfarin regimen of 7.5 mg every day.      Follow Up: INR check at home in one week.     Katja Chisholm, PharmD, BCPS

## 2023-10-23 ENCOUNTER — HOSPITAL ENCOUNTER (OUTPATIENT)
Dept: RADIOLOGY | Facility: HOSPITAL | Age: 68
Discharge: HOME | End: 2023-10-23
Payer: MEDICARE

## 2023-10-23 DIAGNOSIS — I71.40 ABDOMINAL AORTIC ANEURYSM (AAA) WITHOUT RUPTURE, UNSPECIFIED PART (CMS-HCC): ICD-10-CM

## 2023-10-23 PROCEDURE — 2550000001 HC RX 255 CONTRASTS: Performed by: NURSE PRACTITIONER

## 2023-10-23 PROCEDURE — 74174 CTA ABD&PLVS W/CONTRAST: CPT

## 2023-10-23 PROCEDURE — 75635 CT ANGIO ABDOMINAL ARTERIES: CPT | Performed by: RADIOLOGY

## 2023-10-23 RX ADMIN — IOHEXOL 100 ML: 350 INJECTION, SOLUTION INTRAVENOUS at 12:08

## 2023-11-01 ENCOUNTER — TELEMEDICINE (OUTPATIENT)
Dept: VASCULAR SURGERY | Facility: HOSPITAL | Age: 68
End: 2023-11-01
Payer: MEDICARE

## 2023-11-01 DIAGNOSIS — I71.40 ABDOMINAL AORTIC ANEURYSM (AAA) WITHOUT RUPTURE, UNSPECIFIED PART (CMS-HCC): Primary | ICD-10-CM

## 2023-11-01 PROCEDURE — 99212 OFFICE O/P EST SF 10 MIN: CPT | Mod: 95 | Performed by: NURSE PRACTITIONER

## 2023-11-01 PROCEDURE — 99212 OFFICE O/P EST SF 10 MIN: CPT | Performed by: NURSE PRACTITIONER

## 2023-11-02 ENCOUNTER — ANTICOAGULATION - WARFARIN VISIT (OUTPATIENT)
Dept: PHARMACY | Facility: HOSPITAL | Age: 68
End: 2023-11-02
Payer: MEDICARE

## 2023-11-02 LAB
INR IN PPP BY COAGULATION ASSAY EXTERNAL: 2.6 (ref 2–3)
PROTHROMBIN TIME (PT) IN PPP BY COAGULATION ASSAY EXTERNAL: ABNORMAL SECONDS

## 2023-11-02 NOTE — PROGRESS NOTES
SUBJECTIVE    Emma Villela is a 68 y.o. female who is enrolled in the [facility] for anticoagulation management.     Referring Physician: Dr. Ti Nolan  INR Goal: 2.0-3.0  Anticoagulation Medication: Jantoven  Indication: Atrial Fibrillation/Atrial Flutter      OBJECTIVE  INR Today: 2.6  Previous INR: 2.3        ASSESSMENT AND PLAN     Current INR is Therapeutic at 2.6. Previous INR also therapeutic at 2.3.   Plan: no change. Continue current warfarin regimen of 7.5 mg daily.    Follow Up: INR check at home in one week.     Katja Chisholm, NolaD, BCPS

## 2023-11-02 NOTE — PROGRESS NOTES
Vascular Surgery Clinic Note    CC: FUV    History Of Present Illness:   Emma Villela is a 68 y.o. female here for virtual visit to discuss recent CTA. She underwent an EVAR ([employer]tr[employer] Endurant ) via percutaneous femoral access in May 2016 by Dr. Aleman. She had aortic aneurysm sac expansion and underwent ligation of lumbar vessels and sac evacuation by Dr. Obando in May 2020 via RP exposure. She continues to have aortic aneurysm sac expansion. Most recent CTA measures the sac to be 7.3 x 6.8 cm, compared to 6.9 x 6.5 cm in September 2022. There is no obvious endoleak visualized. She denies abdominal or back pain. Her blood pressure is well controlled and she does not smoke. She is on warfarin for a history of afib. She has COPD on 2L continuous oxygen. She has not lost weight yet. She currently weighs 325 pounds.     Medical History:  Patient Active Problem List   Diagnosis    GERD (gastroesophageal reflux disease)    Hypothyroidism, postsurgical    A-fib (CMS/HCC)    Abdominal aortic aneurysm, without rupture, unspecified (CMS/HCC)    Acute bronchitis    Chronic obstructive pulmonary disease (CMS/HCC)    COPD with exacerbation (CMS/HCC)    Allergic rhinitis    Back arthralgia    Low back pain    Benign essential hypertension    Essential (primary) hypertension    BPV (benign positional vertigo)    CHF (NYHA class III, ACC/AHA stage C) (CMS/HCC)    Congestive heart failure (CMS/HCC)    Chronic respiratory failure with hypoxia (CMS/HCC)    Class 2 obesity due to excess calories in adult    Morbid obesity (CMS/HCC)    Colloid thyroid nodule    Edema of leg    Swelling of lower extremity    Gastroduodenitis    Goiter    Herpes simplex type 1 infection    History of radiofrequency ablation procedure for cardiac arrhythmia    Hyperactivity of bladder    Hypercholesterolemia    Hypokalemia    Hypoxia    Insomnia    Lymphedema of both lower extremities    Obstructive sleep apnea    Other specified anemias    Peripheral  neuropathy    Pleural effusion, left    Pneumonia    Post-operative pain    Sinusitis    Suspected COVID-19 virus infection    Urinary symptom or sign    Vitamin D deficiency    Hypomagnesemia        SH:    Social Determinants of Health     Tobacco Use: Low Risk  (10/24/2023)    Patient History     Smoking Tobacco Use: Never     Smokeless Tobacco Use: Never     Passive Exposure: Not on file   Alcohol Use: Not on file   Financial Resource Strain: Not on file   Food Insecurity: Not on file   Transportation Needs: Not on file   Physical Activity: Not on file   Stress: Not on file   Social Connections: Not on file   Intimate Partner Violence: Not on file   Depression: Not at risk (4/26/2023)    PHQ-2     PHQ-2 Score: 0   Housing Stability: Not on file   Utilities: Not on file   Digital Equity: Not on file        FH:  Family History   Problem Relation Name Age of Onset    Stroke Father      Breast cancer Maternal Grandmother          Allergies:   No Known Allergies    ROS:  All systems were reviewed and noted to be negative, other than described above.     Objective:  Last Recorded Vitals  There were no vitals taken for this visit.    Meds:   Current Outpatient Medications   Medication Instructions    acetaminophen (TYLENOL) 325 mg, oral, Every 4 hours PRN    albuterol 90 mcg/actuation inhaler 2 puffs, inhalation, Every 6 hours PRN    amitriptyline (ELAVIL) 25 mg, oral, Nightly    aspirin 81 mg, oral, Daily    atorvastatin (Lipitor) 40 mg tablet TAKE ONE TABLET BY MOUTH EVERY DAY    budesonide-formoteroL (Symbicort) 160-4.5 mcg/actuation inhaler 2 puffs, inhalation, 2 times daily    carvedilol (COREG) 25 mg, oral, Take with food.    cholecalciferol (VITAMIN D-3) 125 mcg, oral, Daily    docusate sodium (COLACE) 100 mg, oral, 2 times daily PRN    ferrous gluconate (FERGON) 324 mg, oral, Daily    furosemide (Lasix) 40 mg tablet 1 tablet, oral, 2 times daily    hydrALAZINE (APRESOLINE) 100 mg, oral, 3 times daily     isosorbide mononitrate ER (IMDUR) 120 mg, oral, Daily    Jantoven 7.5 mg, oral    levothyroxine (Synthroid, Levoxyl) 200 mcg tablet TAKE ONE TABLET BY MOUTH EVERY DAY ALONG WITH A 25 MCG TABLET FOR A TOTAL DAILY DOSE  MCG    levothyroxine (Synthroid, Levoxyl) 50 mcg tablet TAKE 1 TABLET BY MOUTH EVERY DAY ALONG WITH 200MCG TO EQUAL DAILY TOTAL OF 250MCG    losartan (Cozaar) 100 mg tablet TAKE ONE TABLET BY MOUTH EVERY DAY    magnesium oxide (Mag-Ox) 400 mg (241.3 mg magnesium) tablet 1 tablet, oral, 2 times daily with meals    meclizine (ANTIVERT) 25 mg, oral, Nightly    metOLazone (ZAROXOLYN) 5 mg, oral, Twice a week    oxybutynin XL (DITROPAN-XL) 15 mg, oral, Daily    pantoprazole (ProtoNix) 40 mg EC tablet TAKE ONE TABLET BY MOUTH EVERY DAY       Exam:  Unable to obtain - virtual visit.     Imaging Reviewed:  CT angio abdomen pelvis w and or wo IV IV contrast 10/23/2023    Narrative  Interpreted By:  Marcelino Menezes,  talon Louise [facility]  STUDY:  CT ANGIO ABDOMEN PELVIS W AND/OR WO IV IV CONTRAST;  10/23/2023 12:10  pm    INDICATION:  Signs/Symptoms:infrarenal AAA.    COMPARISON:  CT angiography abdomen and pelvis 10/23/2023    ACCESSION NUMBER(S):  [id number]    ORDERING CLINICIAN:  ALFREDA HERMAN    TECHNIQUE:  High-resolution contrast-enhanced helical CT of the abdomen, pelvis  and both lower extremities was performed, timed to the arterial  phase.  3-D processing was performed by the physician on an  independent work station, with MIP and volume-rendering techniques.  Total of 100 ml of Omnipaque 350 was injected intravenously during  the examination.  The study was performed without oral contrast. The  patient tolerated the injection without complications.    FINDINGS:  VASCULAR:    Redemonstration of postoperative changes consistent with endovascular  repair of an infrarenal abdominal aortic aneurysm with an aorto  bi-iliac stent graft. The stent graft remains widely patent. There is  slight interval  increase in size of infrarenal abdominal aortic  aneurysm now measuring 6.8 x 6.6 cm in the maximum transverse  diameters, previously measuring 6.7 x 6.3 in the maximum transverse  dimensions (series 201, image 42). The stent graft is patent with  graft limbs terminating in the common iliac arteries bilaterally.  There is no evidence of  hyperdense contrast within the aneurysmal  sac to suggest endoleak. There are is extensive calcifications within  the abdominal aorta its branches. The celiac artery, superior  mesenteric artery,  and bilateral renal arteries demonstrate no  significant focal stenosis. The inferior mesenteric artery origin has  been excluded. Redemonstration right common iliac artery mild  dilation measuring up to 2.4 cm, previously measuring 2.1 cm (series  201, image 58). The inferior vena cava and portal veins appear  unremarkable.    NONVASCULAR FINDINGS:    LOWER CHEST:  Chronic emphysematous change in the visualized portions of bilateral  lower lungs. The heart is normal in size without pericardial  effusion. No pleural effusion is present. Visualized distal esophagus  appears normal.    ABDOMEN:    LIVER:  Unchanged appearance of multiple, fluid attenuating  well-circumscribed hypodensities consistent with simple liver cysts.  The largest in liver segment VII measures up to 2.6 cm in the  transverse dimension, previously measuring 2.6cm on prior CT (series  401, image 65). The liver is enlarged and measures 22 cm in  craniocaudal dimension.    BILE DUCTS:  The intrahepatic and extrahepatic ducts are not dilated.    GALLBLADDER:  The gallbladder is mildly distended with gallstones visualized. There  is no gallbladder wall thickening or pericholecystic fluid.    PANCREAS:  The pancreas appears unremarkable without evidence of ductal  dilatation or masses.    SPLEEN:  Redemonstration of incisional hernia within the left 9th intercostal  space through which approximally 5.7 cm of the inferior  splenic pole  herniates, previously measuring 4.5 cm (series 401, image 72). There  are no focal lesions within the spleen.    ADRENAL GLANDS:  Bilateral adrenal glands appear normal.    KIDNEYS AND URETERS:  The kidneys are normal in size and enhance symmetrically. Unchanged  appearance of multiple fluid attenuating hypodensities measuring up  to 1.3 cm in largest dimension, consistent with simple renal cysts.  No hydroureteronephrosis or nephroureterolithiasis is identified.    PELVIS:    BLADDER:  The urinary bladder appears normal without abnormal wall thickening.    REPRODUCTIVE ORGANS:  The uterus is surgically absent.    BOWEL:  The stomach is unremarkable. The small and large bowel are normal in  caliber and demonstrate no wall thickening. Few colonic diverticula  without evidence of diverticulitis. The appendix is not definitely  visualized. There is however no pericecal stranding or fluid.      PERITONEUM/RETROPERITONEUM/LYMPH NODES:  There is  no free intraperitoneal air. Redemonstration of  well-circumscribed fluid collection immediately adjacent to the left  lateral aspect of the infrarenal aneurysmal sac and overlying the  left psoas muscle measuring 4.2 x 3.4 x 5.1 cm in the axial and  coronal dimensions, previously measuring 4.3 x 3.1 x 4.6 cm  by  (series 401, image 149 and series 402, image 103). There is no  abdominopelvic lymphadenopathy.    BONES AND ABDOMINAL WALL:  No suspicious osseous lesions are identified. Degenerative discogenic  disease is noted in the lower thoracic and lumbar spine. Ventral  diastasis otherwise, the abdominal wall soft tissues appear normal.    Impression  1. Status post endovascular repair of an infrarenal abdominal aortic  aneurysm with an aorto bi-iliac stent graft. The stent graft remains  patent without evidence of endoleak. There is interval slight  increase in size of the excluded aneurysmal sac.  2. Stable fluid collection adjacent to the left lateral aspect of  the  aneurysmal sac and overlying the left psoas muscle consistent with  postoperative lymphocele or seroma. The collection has been seen on  prior CTs dating back 08/28/2020.  3. Redemonstration of the left 9th intercostal space incisional  hernia containing the inferior pole of the spleen which has slightly  increased when compared to prior CT 09/22/2022.  4. Cholelithiasis without cholecystitis.  5. Stable additional findings as above.    I personally reviewed the image(s) / study and the interpretation of  Dr. Len Glass MD. I agree with the findings as stated. This  study was interpreted at [place], [place].    Signed by: Marcelino Menezes 10/23/2023 5:16 PM  Dictation workstation:   [id number]      Assessment & Plan:  1. Abdominal aortic aneurysm (AAA) without rupture, unspecified part (CMS/HCC)      AAA s/p Medtronic Endurant EVAR with continued sac expansion - no obvious endoleak identified.   Will discuss with Dr. Gant.   Continue blood pressure control.   Encouraged weight loss.     Discussed s/sx of rupture and when to seek medical care    LUIS Mcclain-CNP

## 2023-11-09 ENCOUNTER — ANTICOAGULATION - WARFARIN VISIT (OUTPATIENT)
Dept: PHARMACY | Facility: HOSPITAL | Age: 68
End: 2023-11-09
Payer: MEDICARE

## 2023-11-09 LAB
INR IN PPP BY COAGULATION ASSAY EXTERNAL: 3.2 (ref 2–3)
PROTHROMBIN TIME (PT) IN PPP BY COAGULATION ASSAY EXTERNAL: ABNORMAL SECONDS

## 2023-11-09 NOTE — PROGRESS NOTES
Subjective     Emma Villela is a 68 y.o. female who presents for anticoagulation follow up.     Location: [facility]     Referring Physician: Dr Ti Nolan  INR Goal: 2.0-3.0  Anticoagulation Medication: Jantoven  Indication: Atrial Fibrillation/Atrial Flutter    Bleeding signs/symptoms: No  Bruising: No   Major bleeding event: No  Thrombosis signs/symptoms: No  Thromboembolic event: No  Missed doses: No  Extra doses: No  Medication changes: No  Dietary changes: Yes  Broke tooth last week and only ate soft soft and less greens  Change in health: No  Change in activity: No  Alcohol: No  Other concerns: No    Current Outpatient Medications on File Prior to Visit   Medication Sig Dispense Refill    Jantoven 7.5 mg tablet Take 1 tablet (7.5 mg) by mouth.      acetaminophen (Tylenol) 325 mg tablet Take 1 tablet (325 mg) by mouth every 4 hours if needed.      albuterol 90 mcg/actuation inhaler Inhale 2 puffs every 6 hours if needed.      amitriptyline (Elavil) 25 mg tablet TAKE ONE TABLET BY MOUTH AT BEDTIME 90 tablet 1    aspirin 81 mg EC tablet Take 1 tablet (81 mg) by mouth once daily.      atorvastatin (Lipitor) 40 mg tablet TAKE ONE TABLET BY MOUTH EVERY DAY 90 tablet 0    budesonide-formoteroL (Symbicort) 160-4.5 mcg/actuation inhaler Inhale 2 puffs 2 times a day.      carvedilol (Coreg) 25 mg tablet TAKE ONE TABLET BY MOUTH TWICE A DAY WITH FOOD 180 tablet 0    cholecalciferol (Vitamin D-3) 125 MCG (5000 UT) capsule Take 1 capsule (125 mcg) by mouth once daily. 90 capsule 3    docusate sodium (Colace) 100 mg capsule Take 1 capsule (100 mg) by mouth 2 times a day as needed.      ferrous gluconate 324 (38 Fe) MG tablet TAKE ONE TABLET BY MOUTH EVERY DAY 90 tablet 0    furosemide (Lasix) 40 mg tablet Take 1 tablet (40 mg) by mouth 2 times a day.      hydrALAZINE (Apresoline) 100 mg tablet TAKE ONE TABLET BY MOUTH THREE TIMES A  tablet 0    isosorbide mononitrate ER  (Imdur) 120 mg 24 hr tablet TAKE ONE TABLET BY MOUTH once DAILY 90 tablet 1    levothyroxine (Synthroid, Levoxyl) 200 mcg tablet TAKE ONE TABLET BY MOUTH EVERY DAY ALONG WITH A 25 MCG TABLET FOR A TOTAL DAILY DOSE  MCG 90 tablet 1    levothyroxine (Synthroid, Levoxyl) 50 mcg tablet TAKE 1 TABLET BY MOUTH EVERY DAY ALONG WITH 200MCG TO EQUAL DAILY TOTAL OF 250MCG 90 tablet 0    losartan (Cozaar) 100 mg tablet TAKE ONE TABLET BY MOUTH EVERY DAY 90 tablet 0    magnesium oxide (Mag-Ox) 400 mg (241.3 mg magnesium) tablet Take 1 tablet (400 mg) by mouth 2 times a day with meals.      meclizine (Antivert) 25 mg tablet TAKE ONE TABLET BY MOUTH AT BEDTIME 30 tablet 2    metOLazone (Zaroxolyn) 5 mg tablet Take 1 tablet (5 mg) by mouth. Twice a week      oxybutynin XL (Ditropan-XL) 15 mg 24 hr tablet Take 1 tablet (15 mg) by mouth once daily.      pantoprazole (ProtoNix) 40 mg EC tablet TAKE ONE TABLET BY MOUTH EVERY DAY 90 tablet 1     No current facility-administered medications on file prior to visit.        Objective   INR Today: 3.6  Previous INR: 2.6    Anticoagulation Summary  As of 11/9/2023      INR goal:  2.0-3.0   TTR:  --   INR used for dosing:  3.20 (11/9/2023)   Weekly warfarin total:  52.5 mg             Lab Results   Component Value Date    INR 3.20 (A) 11/09/2023    INR 2.60 (N) 11/02/2023    INR 1.6 (H) 06/22/2020    INR 1.6 (H) 06/19/2020    INR 1.8 (H) 06/18/2020    PROTIME 18.4 (H) 06/22/2020    PROTIME 18.5 (H) 06/19/2020    PROTIME 21.2 (H) 06/18/2020       Assessment/Plan   Current INR is Supratherapeutic at 3.2. Previous INR was Therapeutic at 2.6. Patient reports breaking tooth in the last week. Due to tooth pain, had a change in diet to soft foods and less greens. Tooth was fixed yesterday and reports returning to a more normal diet. Advised to take 3.75 mg x1 today, then resume current warfarin regimen of 7.5 mg daily.     Follow Up: 1 week    Katja Chisholm, NolaD, BCPS

## 2023-11-10 DIAGNOSIS — E78.00 HYPERCHOLESTEROLEMIA: ICD-10-CM

## 2023-11-10 DIAGNOSIS — E89.0 HYPOTHYROIDISM, POSTSURGICAL: ICD-10-CM

## 2023-11-10 DIAGNOSIS — I10 BENIGN ESSENTIAL HYPERTENSION: ICD-10-CM

## 2023-11-10 RX ORDER — ATORVASTATIN CALCIUM 40 MG/1
40 TABLET, FILM COATED ORAL DAILY
Qty: 90 TABLET | Refills: 0 | Status: SHIPPED | OUTPATIENT
Start: 2023-11-10 | End: 2024-02-20

## 2023-11-10 RX ORDER — LEVOTHYROXINE SODIUM 50 UG/1
TABLET ORAL
Qty: 90 TABLET | Refills: 0 | Status: SHIPPED | OUTPATIENT
Start: 2023-11-10 | End: 2024-02-13

## 2023-11-10 RX ORDER — LOSARTAN POTASSIUM 100 MG/1
TABLET ORAL
Qty: 90 TABLET | Refills: 0 | Status: SHIPPED | OUTPATIENT
Start: 2023-11-10 | End: 2024-02-13

## 2023-11-16 ENCOUNTER — ANTICOAGULATION - WARFARIN VISIT (OUTPATIENT)
Dept: PHARMACY | Facility: HOSPITAL | Age: 68
End: 2023-11-16
Payer: MEDICARE

## 2023-11-16 DIAGNOSIS — I48.91 ATRIAL FIBRILLATION, UNSPECIFIED TYPE (MULTI): Primary | ICD-10-CM

## 2023-11-16 LAB
INR IN PPP BY COAGULATION ASSAY EXTERNAL: 3.1 (ref 2–3)
PROTHROMBIN TIME (PT) IN PPP BY COAGULATION ASSAY EXTERNAL: ABNORMAL SECONDS

## 2023-11-16 NOTE — PROGRESS NOTES
Subjective     Emma Villela is a 68 y.o. female who presents for anticoagulation follow up.     Location: [facility]     Referring Physician: Dr Ti Nolan  INR Goal: 2.0-3.0  Indication: Atrial Fibrillation/Atrial Flutter    Bleeding signs/symptoms: No  Bruising: No   Major bleeding event: No  Thrombosis signs/symptoms: No  Thromboembolic event: No  Missed doses: No  Extra doses: No  Medication changes: No  Dietary changes: Yes  Continued decrease in green intake  Change in health: No  Change in activity: No  Alcohol: No  Other concerns: No  Upcoming Surgeries: no      Current Outpatient Medications on File Prior to Visit   Medication Sig Dispense Refill    Jantoven 7.5 mg tablet Take 1 tablet (7.5 mg) by mouth.      acetaminophen (Tylenol) 325 mg tablet Take 1 tablet (325 mg) by mouth every 4 hours if needed.      albuterol 90 mcg/actuation inhaler Inhale 2 puffs every 6 hours if needed.      amitriptyline (Elavil) 25 mg tablet TAKE ONE TABLET BY MOUTH AT BEDTIME 90 tablet 1    aspirin 81 mg EC tablet Take 1 tablet (81 mg) by mouth once daily.      atorvastatin (Lipitor) 40 mg tablet TAKE ONE TABLET BY MOUTH DAILY 90 tablet 0    budesonide-formoteroL (Symbicort) 160-4.5 mcg/actuation inhaler Inhale 2 puffs 2 times a day.      carvedilol (Coreg) 25 mg tablet TAKE ONE TABLET BY MOUTH TWICE A DAY WITH FOOD 180 tablet 0    cholecalciferol (Vitamin D-3) 125 MCG (5000 UT) capsule Take 1 capsule (125 mcg) by mouth once daily. 90 capsule 3    docusate sodium (Colace) 100 mg capsule Take 1 capsule (100 mg) by mouth 2 times a day as needed.      ferrous gluconate 324 (38 Fe) MG tablet TAKE ONE TABLET BY MOUTH EVERY DAY 90 tablet 0    furosemide (Lasix) 40 mg tablet Take 1 tablet (40 mg) by mouth 2 times a day.      hydrALAZINE (Apresoline) 100 mg tablet TAKE ONE TABLET BY MOUTH THREE TIMES A  tablet 0    isosorbide mononitrate ER (Imdur) 120 mg 24 hr tablet TAKE ONE TABLET BY  MOUTH once DAILY 90 tablet 1    levothyroxine (Synthroid, Levoxyl) 200 mcg tablet TAKE ONE TABLET BY MOUTH EVERY DAY ALONG WITH A 25 MCG TABLET FOR A TOTAL DAILY DOSE  MCG 90 tablet 1    levothyroxine (Synthroid, Levoxyl) 50 mcg tablet TAKE 1 TABLET BY MOUTH EVERY DAY ALONG WITH  MCG TABLET TO EQUAL A TOTAL DAILY DOSE  MCG. 90 tablet 0    losartan (Cozaar) 100 mg tablet TAKE ONE TABLET BY MOUTH EVERY DAY 90 tablet 0    magnesium oxide (Mag-Ox) 400 mg (241.3 mg magnesium) tablet Take 1 tablet (400 mg) by mouth 2 times a day with meals.      meclizine (Antivert) 25 mg tablet TAKE ONE TABLET BY MOUTH AT BEDTIME 30 tablet 2    metOLazone (Zaroxolyn) 5 mg tablet Take 1 tablet (5 mg) by mouth. Twice a week      oxybutynin XL (Ditropan-XL) 15 mg 24 hr tablet Take 1 tablet (15 mg) by mouth once daily.      pantoprazole (ProtoNix) 40 mg EC tablet TAKE ONE TABLET BY MOUTH EVERY DAY 90 tablet 1    [DISCONTINUED] atorvastatin (Lipitor) 40 mg tablet TAKE ONE TABLET BY MOUTH EVERY DAY 90 tablet 0    [DISCONTINUED] levothyroxine (Synthroid, Levoxyl) 50 mcg tablet TAKE 1 TABLET BY MOUTH EVERY DAY ALONG WITH 200MCG TO EQUAL DAILY TOTAL OF 250MCG 90 tablet 0    [DISCONTINUED] losartan (Cozaar) 100 mg tablet TAKE ONE TABLET BY MOUTH EVERY DAY 90 tablet 0     No current facility-administered medications on file prior to visit.        Objective   Anticoagulation Summary  As of 11/16/2023      INR goal:  2.0-3.0   TTR:  0.0 % (4 d)   INR used for dosing:  3.10 (11/16/2023)   Weekly warfarin total:  52.5 mg             Lab Results   Component Value Date    INR 3.10 (A) 11/16/2023    INR 3.20 (A) 11/09/2023    INR 2.60 (N) 11/02/2023    PROTIME 18.4 (H) 06/22/2020    PROTIME 18.5 (H) 06/19/2020    PROTIME 21.2 (H) 06/18/2020       Assessment/Plan   Current INR is Supratherapeutic at 3.1. Previous INR was Supratherapeutic at 3.2. Patient reports only eating 1 salad in the last week (below previous normal). Patient reports  she will eat more greens in the next week to get back to previous intake. Advised patient to take 3.75 mg x1 today, then resume current warfarin regimen of 7.5 mg every day.     Follow Up: 1 week, with home INR    Katja Chisholm, NolaD, BCPS

## 2023-11-22 ENCOUNTER — ANTICOAGULATION - WARFARIN VISIT (OUTPATIENT)
Dept: PHARMACY | Facility: HOSPITAL | Age: 68
End: 2023-11-22
Payer: MEDICARE

## 2023-11-22 DIAGNOSIS — I48.91 ATRIAL FIBRILLATION, UNSPECIFIED TYPE (MULTI): Primary | ICD-10-CM

## 2023-11-22 LAB
INR IN PPP BY COAGULATION ASSAY EXTERNAL: 2.5 (ref 2–3)
PROTHROMBIN TIME (PT) IN PPP BY COAGULATION ASSAY EXTERNAL: NORMAL SECONDS

## 2023-11-22 NOTE — PROGRESS NOTES
SUBJECTIVE    Emma Villela is a 68 y.o. female who is enrolled in the [facility] for anticoagulation management.     Referring Physician: Dr. Ti Nolan   INR Goal: 2.0-3.0  Anticoagulation Medication: Jantoven  Indication: Atrial Fibrillation/Atrial Flutter    OBJECTIVE  INR Today: 2.5  Previous INR: 3.1    ASSESSMENT AND PLAN     Current INR is Therapeutic at 2.5. Previous INR was Supratherapeutic at 3.1. Plan to continue current warfarin regimen of 7.5 mg every day. Follow up with INR check at home in one week.     Katja Chisholm, NolaD, BCPS

## 2023-11-27 DIAGNOSIS — D64.89 ANEMIA DUE TO OTHER CAUSE, NOT CLASSIFIED: ICD-10-CM

## 2023-11-27 DIAGNOSIS — I10 BENIGN ESSENTIAL HYPERTENSION: ICD-10-CM

## 2023-11-28 RX ORDER — FERROUS GLUCONATE 324(38)MG
1 TABLET ORAL DAILY
Qty: 90 TABLET | Refills: 0 | Status: SHIPPED | OUTPATIENT
Start: 2023-11-28 | End: 2024-03-06

## 2023-11-28 RX ORDER — CARVEDILOL 25 MG/1
25 TABLET ORAL
Qty: 180 TABLET | Refills: 0 | Status: SHIPPED | OUTPATIENT
Start: 2023-11-28 | End: 2024-01-21 | Stop reason: HOSPADM

## 2023-11-30 ENCOUNTER — APPOINTMENT (OUTPATIENT)
Dept: PULMONOLOGY | Facility: CLINIC | Age: 68
End: 2023-11-30
Payer: MEDICARE

## 2023-11-30 ENCOUNTER — ANTICOAGULATION - WARFARIN VISIT (OUTPATIENT)
Dept: PHARMACY | Facility: HOSPITAL | Age: 68
End: 2023-11-30

## 2023-11-30 LAB
INR IN PPP BY COAGULATION ASSAY EXTERNAL: 3 (ref 2–3)
PROTHROMBIN TIME (PT) IN PPP BY COAGULATION ASSAY EXTERNAL: ABNORMAL SECONDS

## 2023-11-30 NOTE — PROGRESS NOTES
SUBJECTIVE    Emma Villela is a 68 y.o. female who is enrolled in the [facility] for anticoagulation management.     Referring Physician: Dr Ti Nolan  INR Goal: 2.0-3.0  Indication: Atrial Fibrillation/Atrial Flutter    OBJECTIVE        ASSESSMENT AND PLAN     Current INR is Therapeutic at 3.0. Previous INR was Therapeutic at 2.5. Plan to continue current warfarin regimen of 7.5 mg every day. Follow up with INR check at home in one week.     Katja Chisholm, PharmD, BCPS

## 2023-12-01 DIAGNOSIS — I50.9 CHF (NYHA CLASS III, ACC/AHA STAGE C) (MULTI): ICD-10-CM

## 2023-12-01 DIAGNOSIS — I48.11 LONGSTANDING PERSISTENT ATRIAL FIBRILLATION (MULTI): Primary | ICD-10-CM

## 2023-12-01 RX ORDER — FUROSEMIDE 40 MG/1
40 TABLET ORAL 2 TIMES DAILY
Qty: 180 TABLET | Refills: 0 | Status: SHIPPED | OUTPATIENT
Start: 2023-12-01

## 2023-12-01 RX ORDER — WARFARIN SODIUM 7.5 MG/1
7.5 TABLET ORAL
Qty: 90 TABLET | Refills: 1 | Status: SHIPPED | OUTPATIENT
Start: 2023-12-01

## 2023-12-06 ENCOUNTER — TELEPHONE (OUTPATIENT)
Dept: VASCULAR SURGERY | Facility: HOSPITAL | Age: 68
End: 2023-12-06

## 2023-12-06 ENCOUNTER — OFFICE VISIT (OUTPATIENT)
Dept: VASCULAR SURGERY | Facility: HOSPITAL | Age: 68
End: 2023-12-06
Payer: MEDICARE

## 2023-12-06 VITALS
WEIGHT: 293 LBS | HEART RATE: 66 BPM | HEIGHT: 67 IN | DIASTOLIC BLOOD PRESSURE: 84 MMHG | BODY MASS INDEX: 45.99 KG/M2 | OXYGEN SATURATION: 92 % | SYSTOLIC BLOOD PRESSURE: 130 MMHG

## 2023-12-06 DIAGNOSIS — I71.43 INFRARENAL ABDOMINAL AORTIC ANEURYSM (AAA) WITHOUT RUPTURE (CMS-HCC): Primary | ICD-10-CM

## 2023-12-06 DIAGNOSIS — T82.310A TYPE I ENDOLEAK OF AORTIC GRAFT (CMS-HCC): ICD-10-CM

## 2023-12-06 PROCEDURE — 1160F RVW MEDS BY RX/DR IN RCRD: CPT | Performed by: SURGERY

## 2023-12-06 PROCEDURE — 1126F AMNT PAIN NOTED NONE PRSNT: CPT | Performed by: SURGERY

## 2023-12-06 PROCEDURE — 1036F TOBACCO NON-USER: CPT | Performed by: SURGERY

## 2023-12-06 PROCEDURE — 3079F DIAST BP 80-89 MM HG: CPT | Performed by: SURGERY

## 2023-12-06 PROCEDURE — 99215 OFFICE O/P EST HI 40 MIN: CPT | Performed by: SURGERY

## 2023-12-06 PROCEDURE — 3008F BODY MASS INDEX DOCD: CPT | Performed by: SURGERY

## 2023-12-06 PROCEDURE — 1159F MED LIST DOCD IN RCRD: CPT | Performed by: SURGERY

## 2023-12-06 PROCEDURE — 3075F SYST BP GE 130 - 139MM HG: CPT | Performed by: SURGERY

## 2023-12-06 ASSESSMENT — PAIN SCALES - GENERAL: PAINLEVEL: 0-NO PAIN

## 2023-12-06 NOTE — TELEPHONE ENCOUNTER
I have had the pleasure of speaking with  Mrs Villela.   She has an established cardiologist  Dr. Callejas ( [phone] )  .  With the patient's  permission she has been scheduled for an evaluation by Dr. Callejas for cardiac clearance on 1/2/2024 @ 2 PM.   YANDEL Hanks RN

## 2023-12-06 NOTE — PROGRESS NOTES
Vascular Surgery Clinic Note    CC: AAA    HPI:  Emma Villela is 68 y.o. female with history of afib (s/p ablation, on coumadin), COPD On 2L O2 at all times, smoking (quit 4-5 yrs ago), morbid obesity (BMI 50), and AAA. She had medtronic EVAR in 2016. She developed growth of the aneurysm sac and presumed type II endoleak and underwent open ligation of lumbar vessels via left RP approach in 2020. She now has continued growth up to 7cm with evidence of type Ib endoleak and possible type Ia endoleak. She has no abdominal or back pain. She barely walks and uses a walker or wheelchair. Her walking is limited by joint pains and shortness of breath.     Medical History:   has a past medical history of Cellulitis, unspecified, Essential (primary) hypertension (05/21/2013), Nontoxic goiter, unspecified, Other conditions influencing health status, Personal history of other diseases of the circulatory system (03/03/2015), Personal history of other diseases of the nervous system and sense organs, Personal history of other diseases of the respiratory system (10/21/2014), Personal history of other medical treatment (01/25/2022), and Personal history of other specified conditions (06/25/2013).    Meds:   Current Outpatient Medications on File Prior to Visit   Medication Sig Dispense Refill    acetaminophen (Tylenol) 325 mg tablet Take 1 tablet (325 mg) by mouth every 4 hours if needed.      albuterol 90 mcg/actuation inhaler Inhale 2 puffs every 6 hours if needed.      amitriptyline (Elavil) 25 mg tablet TAKE ONE TABLET BY MOUTH AT BEDTIME 90 tablet 1    aspirin 81 mg EC tablet Take 1 tablet (81 mg) by mouth once daily.      atorvastatin (Lipitor) 40 mg tablet TAKE ONE TABLET BY MOUTH DAILY 90 tablet 0    budesonide-formoteroL (Symbicort) 160-4.5 mcg/actuation inhaler Inhale 2 puffs 2 times a day.      carvedilol (Coreg) 25 mg tablet TAKE ONE TABLET BY MOUTH TWICE A DAY WITH FOOD 180 tablet 0    cholecalciferol (Vitamin D-3) 125  MCG (5000 UT) capsule Take 1 capsule (125 mcg) by mouth once daily. 90 capsule 3    docusate sodium (Colace) 100 mg capsule Take 1 capsule (100 mg) by mouth 2 times a day as needed.      ferrous gluconate 324 (38 Fe) MG tablet TAKE ONE TABLET BY MOUTH DAILY 90 tablet 0    furosemide (Lasix) 40 mg tablet TAKE ONE TABLET BY MOUTH TWO TIMES A  tablet 0    hydrALAZINE (Apresoline) 100 mg tablet TAKE ONE TABLET BY MOUTH THREE TIMES A  tablet 0    isosorbide mononitrate ER (Imdur) 120 mg 24 hr tablet TAKE ONE TABLET BY MOUTH once DAILY 90 tablet 1    levothyroxine (Synthroid, Levoxyl) 200 mcg tablet TAKE ONE TABLET BY MOUTH EVERY DAY ALONG WITH A 25 MCG TABLET FOR A TOTAL DAILY DOSE  MCG 90 tablet 1    levothyroxine (Synthroid, Levoxyl) 50 mcg tablet TAKE 1 TABLET BY MOUTH EVERY DAY ALONG WITH  MCG TABLET TO EQUAL A TOTAL DAILY DOSE  MCG. 90 tablet 0    losartan (Cozaar) 100 mg tablet TAKE ONE TABLET BY MOUTH EVERY DAY 90 tablet 0    magnesium oxide (Mag-Ox) 400 mg (241.3 mg magnesium) tablet Take 1 tablet (400 mg) by mouth 2 times a day with meals.      meclizine (Antivert) 25 mg tablet TAKE ONE TABLET BY MOUTH AT BEDTIME 30 tablet 2    metOLazone (Zaroxolyn) 5 mg tablet Take 1 tablet (5 mg) by mouth. Twice a week      oxybutynin XL (Ditropan-XL) 15 mg 24 hr tablet Take 1 tablet (15 mg) by mouth once daily.      pantoprazole (ProtoNix) 40 mg EC tablet TAKE ONE TABLET BY MOUTH EVERY DAY 90 tablet 1    warfarin (Jantoven) 7.5 mg tablet TAKE ONE TABLET BY MOUTH EVERY DAY 90 tablet 1    [DISCONTINUED] furosemide (Lasix) 40 mg tablet Take 1 tablet (40 mg) by mouth 2 times a day.      [DISCONTINUED] Jantoven 7.5 mg tablet Take 1 tablet (7.5 mg) by mouth.       No current facility-administered medications on file prior to visit.        Allergies:   No Known Allergies    SH:    Social Determinants of Health     Tobacco Use: Low Risk  (12/6/2023)    Patient History     Smoking Tobacco Use: Never      Smokeless Tobacco Use: Never     Passive Exposure: Not on file   Alcohol Use: Not on file   Financial Resource Strain: Not on file   Food Insecurity: Not on file   Transportation Needs: Not on file   Physical Activity: Not on file   Stress: Not on file   Social Connections: Not on file   Intimate Partner Violence: Not on file   Depression: Not at risk (4/26/2023)    PHQ-2     PHQ-2 Score: 0   Housing Stability: Not on file   Utilities: Not on file   Digital Equity: Not on file        FH:  Family History   Problem Relation Name Age of Onset    Stroke Father      Breast cancer Maternal Grandmother          ROS:  All systems were reviewed and are negative except as per HPI.    Objective:  Vitals:  Vitals:    12/06/23 1024   BP: 130/84   Pulse: 66   SpO2: 92%        Exam:  In NAD, tired appearing  Abd Soft, ND/NT, left RP incision is well healed  Vascular examination:  Palpable femoral pulses  Pedal pulses are not palpable 2/2 edema in the legs  Palpable radial and brachial pulses bilaterally    Assessment & Plan:  Emma Villela is 68 y.o. female with endoleak Ib, possible Ia. We discussed endovascular repair as she is not a good candidate for re-do open surgical repair. She would like to proceed. I will obtain cardiac risk stratification prior to surgery.      I spent a total of 40 minutes on the day of the visit.         Sj Walton M.D.

## 2023-12-07 ENCOUNTER — ANTICOAGULATION - WARFARIN VISIT (OUTPATIENT)
Dept: PHARMACY | Facility: HOSPITAL | Age: 68
End: 2023-12-07
Payer: MEDICARE

## 2023-12-07 DIAGNOSIS — I48.91 ATRIAL FIBRILLATION, UNSPECIFIED TYPE (MULTI): Primary | ICD-10-CM

## 2023-12-07 LAB
INR IN PPP BY COAGULATION ASSAY EXTERNAL: 2.1 (ref 2–3)
PROTHROMBIN TIME (PT) IN PPP BY COAGULATION ASSAY EXTERNAL: ABNORMAL SECONDS

## 2023-12-07 NOTE — PROGRESS NOTES
SUBJECTIVE    Emma Villela is a 68 y.o. female who is enrolled in the [facility] for anticoagulation management.     Referring Physician: Dr. Ti Nolan   INR Goal: 2.0-3.0  Indication: Atrial Fibrillation/Atrial Flutter    OBJECTIVE        ASSESSMENT AND PLAN     Current INR is Therapeutic at 2.1. Previous INR was Therapeutic at 3.0. Plan to continue current warfarin regimen of 7.5 mg every day. Follow up with INR check at home in one week.     Katja Chisholm, NolaD, BCPS

## 2023-12-11 ENCOUNTER — PREP FOR PROCEDURE (OUTPATIENT)
Dept: VASCULAR SURGERY | Facility: HOSPITAL | Age: 68
End: 2023-12-11
Payer: MEDICARE

## 2023-12-11 DIAGNOSIS — T82.310A TYPE I ENDOLEAK OF AORTIC GRAFT (CMS-HCC): ICD-10-CM

## 2023-12-11 DIAGNOSIS — I71.40 ABDOMINAL AORTIC ANEURYSM (AAA) WITHOUT RUPTURE, UNSPECIFIED PART (CMS-HCC): Primary | ICD-10-CM

## 2023-12-11 DIAGNOSIS — I10 BENIGN ESSENTIAL HYPERTENSION: ICD-10-CM

## 2023-12-11 RX ORDER — HYDRALAZINE HYDROCHLORIDE 100 MG/1
100 TABLET, FILM COATED ORAL 3 TIMES DAILY
Qty: 270 TABLET | Refills: 0 | Status: SHIPPED | OUTPATIENT
Start: 2023-12-11 | End: 2024-04-25

## 2023-12-14 ENCOUNTER — ANTICOAGULATION - WARFARIN VISIT (OUTPATIENT)
Dept: PHARMACY | Facility: HOSPITAL | Age: 68
End: 2023-12-14
Payer: MEDICARE

## 2023-12-14 DIAGNOSIS — I48.91 ATRIAL FIBRILLATION, UNSPECIFIED TYPE (MULTI): Primary | ICD-10-CM

## 2023-12-14 LAB
INR IN PPP BY COAGULATION ASSAY EXTERNAL: 2.7 (ref 2–3)
PROTHROMBIN TIME (PT) IN PPP BY COAGULATION ASSAY EXTERNAL: ABNORMAL SECONDS

## 2023-12-14 NOTE — PROGRESS NOTES
SUBJECTIVE    Emma Villela is a 68 y.o. female who is enrolled in the [facility] for anticoagulation management.     Referring Physician: Dr. Ti Nolan  INR Goal: 2.0-3.0  Indication: Atrial Fibrillation/Atrial Flutter    OBJECTIVE        ASSESSMENT AND PLAN     Current INR is Therapeutic at 2.7. Previous INR was Therapeutic at 2.1. Plan to continue current warfarin regimen of 7.5mg every day. Follow up with INR check at home in one week.     Katja Chisholm, NolaD, BCPS

## 2023-12-21 ENCOUNTER — ANTICOAGULATION - WARFARIN VISIT (OUTPATIENT)
Dept: PHARMACY | Facility: HOSPITAL | Age: 68
End: 2023-12-21
Payer: MEDICARE

## 2023-12-21 DIAGNOSIS — I48.91 ATRIAL FIBRILLATION, UNSPECIFIED TYPE (MULTI): Primary | ICD-10-CM

## 2023-12-21 NOTE — PROGRESS NOTES
Subjective     Emma Villela is a 68 y.o. female who presents for anticoagulation follow up.     Location: [facility]     Referring Physician: Dr. Ti Nolan  INR Goal: 2.0-3.0  Indication: Atrial Fibrillation/Atrial Flutter    Bleeding signs/symptoms: No  Bruising: No   Major bleeding event: No  Thrombosis signs/symptoms: No  Thromboembolic event: No  Missed doses: No  Extra doses: No  Medication changes: No  Dietary changes: Yes  less greens due to holidays  Change in health: No  Change in activity: No  Alcohol: No  Other concerns: No  Upcoming Surgeries: no      Current Outpatient Medications on File Prior to Visit   Medication Sig Dispense Refill    acetaminophen (Tylenol) 325 mg tablet Take 1 tablet (325 mg) by mouth every 4 hours if needed.      albuterol 90 mcg/actuation inhaler Inhale 2 puffs every 6 hours if needed.      amitriptyline (Elavil) 25 mg tablet TAKE ONE TABLET BY MOUTH AT BEDTIME 90 tablet 1    aspirin 81 mg EC tablet Take 1 tablet (81 mg) by mouth once daily.      atorvastatin (Lipitor) 40 mg tablet TAKE ONE TABLET BY MOUTH DAILY 90 tablet 0    budesonide-formoteroL (Symbicort) 160-4.5 mcg/actuation inhaler Inhale 2 puffs 2 times a day.      carvedilol (Coreg) 25 mg tablet TAKE ONE TABLET BY MOUTH TWICE A DAY WITH FOOD 180 tablet 0    cholecalciferol (Vitamin D-3) 125 MCG (5000 UT) capsule Take 1 capsule (125 mcg) by mouth once daily. 90 capsule 3    docusate sodium (Colace) 100 mg capsule Take 1 capsule (100 mg) by mouth 2 times a day as needed.      ferrous gluconate 324 (38 Fe) MG tablet TAKE ONE TABLET BY MOUTH DAILY 90 tablet 0    furosemide (Lasix) 40 mg tablet TAKE ONE TABLET BY MOUTH TWO TIMES A  tablet 0    hydrALAZINE (Apresoline) 100 mg tablet TAKE ONE TABLET BY MOUTH THREE TIMES A  tablet 0    isosorbide mononitrate ER (Imdur) 120 mg 24 hr tablet TAKE ONE TABLET BY MOUTH once DAILY 90 tablet 1    levothyroxine (Synthroid,  Levoxyl) 200 mcg tablet TAKE ONE TABLET BY MOUTH EVERY DAY ALONG WITH A 25 MCG TABLET FOR A TOTAL DAILY DOSE  MCG 90 tablet 1    levothyroxine (Synthroid, Levoxyl) 50 mcg tablet TAKE 1 TABLET BY MOUTH EVERY DAY ALONG WITH  MCG TABLET TO EQUAL A TOTAL DAILY DOSE  MCG. 90 tablet 0    losartan (Cozaar) 100 mg tablet TAKE ONE TABLET BY MOUTH EVERY DAY 90 tablet 0    magnesium oxide (Mag-Ox) 400 mg (241.3 mg magnesium) tablet Take 1 tablet (400 mg) by mouth 2 times a day with meals.      meclizine (Antivert) 25 mg tablet TAKE ONE TABLET BY MOUTH AT BEDTIME 30 tablet 2    metOLazone (Zaroxolyn) 5 mg tablet Take 1 tablet (5 mg) by mouth. Twice a week      oxybutynin XL (Ditropan-XL) 15 mg 24 hr tablet Take 1 tablet (15 mg) by mouth once daily.      pantoprazole (ProtoNix) 40 mg EC tablet TAKE ONE TABLET BY MOUTH EVERY DAY 90 tablet 1    warfarin (Jantoven) 7.5 mg tablet TAKE ONE TABLET BY MOUTH EVERY DAY 90 tablet 1     No current facility-administered medications on file prior to visit.        Objective   Anticoagulation Summary  As of 12/21/2023      INR goal:  2.0-3.0   TTR:  84.4 % (1.1 mo)   INR used for dosing:               Lab Results   Component Value Date    INR 2.70 (N) 12/14/2023    INR 2.10 (N) 12/07/2023    INR 3.00 (N) 11/30/2023    PROTIME 18.4 (H) 06/22/2020    PROTIME 18.5 (H) 06/19/2020    PROTIME 21.2 (H) 06/18/2020       Assessment/Plan   Current INR is Supratherapeutic at 3.2. Previous INR was Therapeutic at 2.7. Patient reports eating less greens than normal over past week due to holiday season but plans to resume normal diet. No other changes reported. Advised patient to take 3.75 mg today (12/21/23), then resume current warfarin regimen of 7.5 mg every day.     Follow Up: 1 week    Katja Chisholm, NolaD, BCPS

## 2023-12-28 ENCOUNTER — PRE-ADMISSION TESTING (OUTPATIENT)
Dept: PREADMISSION TESTING | Facility: HOSPITAL | Age: 68
End: 2023-12-28
Payer: MEDICARE

## 2023-12-28 ENCOUNTER — ANTICOAGULATION - WARFARIN VISIT (OUTPATIENT)
Dept: PHARMACY | Facility: HOSPITAL | Age: 68
End: 2023-12-28

## 2023-12-28 ENCOUNTER — HOSPITAL ENCOUNTER (OUTPATIENT)
Dept: CARDIOLOGY | Facility: HOSPITAL | Age: 68
Discharge: HOME | End: 2023-12-28
Payer: MEDICARE

## 2023-12-28 VITALS
SYSTOLIC BLOOD PRESSURE: 133 MMHG | BODY MASS INDEX: 45.99 KG/M2 | WEIGHT: 293 LBS | DIASTOLIC BLOOD PRESSURE: 79 MMHG | TEMPERATURE: 98.1 F | OXYGEN SATURATION: 94 % | HEIGHT: 67 IN | HEART RATE: 100 BPM

## 2023-12-28 DIAGNOSIS — I48.91 ATRIAL FIBRILLATION, UNSPECIFIED TYPE (MULTI): ICD-10-CM

## 2023-12-28 DIAGNOSIS — E78.00 HYPERCHOLESTEROLEMIA: ICD-10-CM

## 2023-12-28 DIAGNOSIS — I48.91 ATRIAL FIBRILLATION, UNSPECIFIED TYPE (MULTI): Primary | ICD-10-CM

## 2023-12-28 DIAGNOSIS — Z01.818 PREOP EXAMINATION: Primary | ICD-10-CM

## 2023-12-28 DIAGNOSIS — Z79.01 WARFARIN ANTICOAGULATION: ICD-10-CM

## 2023-12-28 DIAGNOSIS — I71.40 ABDOMINAL AORTIC ANEURYSM (AAA) WITHOUT RUPTURE, UNSPECIFIED PART (CMS-HCC): ICD-10-CM

## 2023-12-28 DIAGNOSIS — Z01.818 PREOP EXAMINATION: ICD-10-CM

## 2023-12-28 DIAGNOSIS — R79.9 ABNORMAL FINDING OF BLOOD CHEMISTRY, UNSPECIFIED: ICD-10-CM

## 2023-12-28 LAB
ABO GROUP (TYPE) IN BLOOD: NORMAL
ANION GAP SERPL CALC-SCNC: 12 MMOL/L (ref 10–20)
ANTIBODY SCREEN: NORMAL
APPEARANCE UR: ABNORMAL
APTT PPP: 53 SECONDS (ref 27–38)
BACTERIA #/AREA URNS AUTO: ABNORMAL /HPF
BILIRUB UR STRIP.AUTO-MCNC: NEGATIVE MG/DL
BUN SERPL-MCNC: 19 MG/DL (ref 6–23)
CALCIUM SERPL-MCNC: 10.3 MG/DL (ref 8.6–10.6)
CHLORIDE SERPL-SCNC: 100 MMOL/L (ref 98–107)
CO2 SERPL-SCNC: 35 MMOL/L (ref 21–32)
COLOR UR: YELLOW
CREAT SERPL-MCNC: 0.88 MG/DL (ref 0.5–1.05)
ERYTHROCYTE [DISTWIDTH] IN BLOOD BY AUTOMATED COUNT: 14.3 % (ref 11.5–14.5)
EST. AVERAGE GLUCOSE BLD GHB EST-MCNC: 111 MG/DL
GFR SERPL CREATININE-BSD FRML MDRD: 71 ML/MIN/1.73M*2
GLUCOSE SERPL-MCNC: 102 MG/DL (ref 74–99)
GLUCOSE UR STRIP.AUTO-MCNC: NEGATIVE MG/DL
HBA1C MFR BLD: 5.5 %
HCT VFR BLD AUTO: 32.1 % (ref 36–46)
HGB BLD-MCNC: 10 G/DL (ref 12–16)
HOLD SPECIMEN: NORMAL
INR IN PPP BY COAGULATION ASSAY EXTERNAL: 3.3 (ref 2–3)
INR PPP: 3.6 (ref 0.9–1.1)
KETONES UR STRIP.AUTO-MCNC: NEGATIVE MG/DL
LEUKOCYTE ESTERASE UR QL STRIP.AUTO: ABNORMAL
MCH RBC QN AUTO: 29.9 PG (ref 26–34)
MCHC RBC AUTO-ENTMCNC: 31.2 G/DL (ref 32–36)
MCV RBC AUTO: 96 FL (ref 80–100)
MUCOUS THREADS #/AREA URNS AUTO: ABNORMAL /LPF
NITRITE UR QL STRIP.AUTO: POSITIVE
NRBC BLD-RTO: 0 /100 WBCS (ref 0–0)
PH UR STRIP.AUTO: 6 [PH]
PLATELET # BLD AUTO: 142 X10*3/UL (ref 150–450)
POTASSIUM SERPL-SCNC: 3.5 MMOL/L (ref 3.5–5.3)
PROT UR STRIP.AUTO-MCNC: NEGATIVE MG/DL
PROTHROMBIN TIME (PT) IN PPP BY COAGULATION ASSAY EXTERNAL: ABNORMAL SECONDS
PROTHROMBIN TIME: 40.8 SECONDS (ref 9.8–12.8)
RBC # BLD AUTO: 3.34 X10*6/UL (ref 4–5.2)
RBC # UR STRIP.AUTO: NEGATIVE /UL
RBC #/AREA URNS AUTO: ABNORMAL /HPF
RH FACTOR (ANTIGEN D): NORMAL
SODIUM SERPL-SCNC: 143 MMOL/L (ref 136–145)
SP GR UR STRIP.AUTO: 1.01
SQUAMOUS #/AREA URNS AUTO: ABNORMAL /HPF
UROBILINOGEN UR STRIP.AUTO-MCNC: <2 MG/DL
WBC # BLD AUTO: 5 X10*3/UL (ref 4.4–11.3)
WBC #/AREA URNS AUTO: ABNORMAL /HPF

## 2023-12-28 PROCEDURE — 86850 RBC ANTIBODY SCREEN: CPT | Performed by: NURSE PRACTITIONER

## 2023-12-28 PROCEDURE — 87086 URINE CULTURE/COLONY COUNT: CPT | Performed by: NURSE PRACTITIONER

## 2023-12-28 PROCEDURE — 81001 URINALYSIS AUTO W/SCOPE: CPT | Performed by: NURSE PRACTITIONER

## 2023-12-28 PROCEDURE — 87081 CULTURE SCREEN ONLY: CPT | Mod: 59 | Performed by: NURSE PRACTITIONER

## 2023-12-28 PROCEDURE — 85027 COMPLETE CBC AUTOMATED: CPT | Performed by: NURSE PRACTITIONER

## 2023-12-28 PROCEDURE — 85610 PROTHROMBIN TIME: CPT | Performed by: NURSE PRACTITIONER

## 2023-12-28 PROCEDURE — 93010 ELECTROCARDIOGRAM REPORT: CPT | Performed by: INTERNAL MEDICINE

## 2023-12-28 PROCEDURE — 83036 HEMOGLOBIN GLYCOSYLATED A1C: CPT | Performed by: NURSE PRACTITIONER

## 2023-12-28 PROCEDURE — 99214 OFFICE O/P EST MOD 30 MIN: CPT | Performed by: NURSE PRACTITIONER

## 2023-12-28 PROCEDURE — 80048 BASIC METABOLIC PNL TOTAL CA: CPT | Performed by: NURSE PRACTITIONER

## 2023-12-28 PROCEDURE — 93005 ELECTROCARDIOGRAM TRACING: CPT

## 2023-12-28 PROCEDURE — 36415 COLL VENOUS BLD VENIPUNCTURE: CPT

## 2023-12-28 RX ORDER — CHLORHEXIDINE GLUCONATE 40 MG/ML
SOLUTION TOPICAL 2 TIMES DAILY
Qty: 473 ML | Refills: 0 | Status: SHIPPED | OUTPATIENT
Start: 2023-12-28 | End: 2024-01-02

## 2023-12-28 RX ORDER — CHLORHEXIDINE GLUCONATE ORAL RINSE 1.2 MG/ML
15 SOLUTION DENTAL SEE ADMIN INSTRUCTIONS
Qty: 473 ML | Refills: 0 | Status: SHIPPED | OUTPATIENT
Start: 2023-12-28 | End: 2024-02-28 | Stop reason: ALTCHOICE

## 2023-12-28 ASSESSMENT — DUKE ACTIVITY SCORE INDEX (DASI)
CAN YOU DO HEAVY WORK AROUND THE HOUSE LIKE SCRUBBING FLOORS OR LIFTING AND MOVING HEAVY FURNITURE: NO
CAN YOU PARTICIPATE IN MODERATE RECREATIONAL ACTIVITIES LIKE GOLF, BOWLING, DANCING, DOUBLES TENNIS OR THROWING A BASEBALL OR FOOTBALL: NO
CAN YOU TAKE CARE OF YOURSELF (EAT, DRESS, BATHE, OR USE TOILET): YES
CAN YOU WALK INDOORS, SUCH AS AROUND YOUR HOUSE: YES
CAN YOU DO LIGHT WORK AROUND THE HOUSE LIKE DUSTING OR WASHING DISHES: YES
CAN YOU HAVE SEXUAL RELATIONS: NO
CAN YOU DO MODERATE WORK AROUND THE HOUSE LIKE VACUUMING, SWEEPING FLOORS OR CARRYING GROCERIES: YES
CAN YOU CLIMB A FLIGHT OF STAIRS OR WALK UP A HILL: NO
CAN YOU DO YARD WORK LIKE RAKING LEAVES, WEEDING OR PUSHING A MOWER: NO
CAN YOU WALK A BLOCK OR TWO ON LEVEL GROUND: NO
CAN YOU RUN A SHORT DISTANCE: NO
DASI METS SCORE: 4.1
CAN YOU PARTICIPATE IN STRENOUS SPORTS LIKE SWIMMING, SINGLES TENNIS, FOOTBALL, BASKETBALL, OR SKIING: NO
TOTAL_SCORE: 10.7

## 2023-12-28 ASSESSMENT — CHADS2 SCORE
CHF: NO
HYPERTENSION: YES
CHADS2 SCORE: 1
AGE GREATER THAN OR EQUAL TO 75: NO
PRIOR STROKE OR TIA OR THROMBOEMBOLISM: NO
DIABETES: NO

## 2023-12-28 ASSESSMENT — LIFESTYLE VARIABLES: SMOKING_STATUS: NONSMOKER

## 2023-12-28 NOTE — CPM/PAT H&P
CPM/PAT Evaluation       Name: Emma Villela (Emma Villela)  /Age: 1955/68 y.o.     Visit Type:   In-Person       Chief Complaint: Patient is a 68 year old female scheduled for Repair Endovascular Abdominal Aorta with Dr. Joseph Gant and Dr. Sj Walton on 24    HPI Patient is a 68 year old female , accompanied by significant other, scheduled for Repair Endovascular Abdominal Aorta with Dr. Gant and Dr. Walton related to Abdominal aortic aneurysm (AAA) without rupture, unspecified part, Type I endoleak of aortic graft. Patient referred Dr. Gant for preoperative evaluation of CHF, atrial fib, hyperlipidemia, hypertension, COPD and sleep apnea.    Past Medical History:   Diagnosis Date    Cellulitis, unspecified     Cellulitis    COPD (chronic obstructive pulmonary disease) (CMS/[facility])     Essential (primary) hypertension 2013    Malignant essential hypertension    Nontoxic goiter, unspecified     Goiter    Other conditions influencing health status     Pneumonia    Personal history of other diseases of the circulatory system 2015    History of atrial flutter    Personal history of other diseases of the nervous system and sense organs     History of peripheral neuropathy    Personal history of other diseases of the respiratory system 10/21/2014    History of acute sinusitis    Personal history of other medical treatment 2022    History of screening mammography    Personal history of other specified conditions 2013    History of urinary incontinence    Sleep apnea        Past Surgical History:   Procedure Laterality Date    BLADDER SURGERY  2013    Bladder Surgery    CT ABDOMEN PELVIS ANGIOGRAM W AND/OR WO IV CONTRAST  2016    CT ABDOMEN PELVIS ANGIOGRAM W AND/OR WO IV CONTRAST 2016 [facility]    CT ABDOMEN PELVIS ANGIOGRAM W AND/OR WO IV CONTRAST  2016    CT ABDOMEN PELVIS ANGIOGRAM W AND/OR WO IV CONTRAST 2016 [facility]    CT ABDOMEN  PELVIS ANGIOGRAM W AND/OR WO IV CONTRAST  3/2/2018    CT ABDOMEN PELVIS ANGIOGRAM W AND/OR WO IV CONTRAST 3/2/2018 [facility]    CT ABDOMEN PELVIS ANGIOGRAM W AND/OR WO IV CONTRAST  2/22/2019    CT ABDOMEN PELVIS ANGIOGRAM W AND/OR WO IV CONTRAST 2/22/2019 [facility]    CT ABDOMEN PELVIS ANGIOGRAM W AND/OR WO IV CONTRAST  2/28/2020    CT ABDOMEN PELVIS ANGIOGRAM W AND/OR WO IV CONTRAST 2/28/2020 [facility]    CT ABDOMEN PELVIS ANGIOGRAM W AND/OR WO IV CONTRAST  8/28/2020    CT ABDOMEN PELVIS ANGIOGRAM W AND/OR WO IV CONTRAST 8/28/2020 [facility]    CT ABDOMEN PELVIS ANGIOGRAM W AND/OR WO IV CONTRAST  6/12/2017    CT ABDOMEN PELVIS ANGIOGRAM W AND/OR WO IV CONTRAST 6/12/2017 [facility]    CT ABDOMEN PELVIS ANGIOGRAM W AND/OR WO IV CONTRAST  11/29/2016    CT ABDOMEN PELVIS ANGIOGRAM W AND/OR WO IV CONTRAST 11/29/2016 [facility]    CT ABDOMEN PELVIS ANGIOGRAM W AND/OR WO IV CONTRAST  9/22/2022    CT ABDOMEN PELVIS ANGIOGRAM W AND/OR WO IV CONTRAST 9/22/2022 [facility]    CT ABDOMEN PELVIS ANGIOGRAM W AND/OR WO IV CONTRAST  10/23/2023    CT ABDOMEN PELVIS ANGIOGRAM W AND/OR WO IV CONTRAST 10/23/2023 GEN CT    IR ANGIOGRAM AORTA ABDOMEN  5/12/2020    IR ANGIOGRAM AORTA ABDOMEN 5/12/2020 [facility]    KNEE ARTHROSCOPY W/ DEBRIDEMENT  05/14/2013    Knee Arthroscopy (Therapeutic)    OTHER SURGICAL HISTORY  10/24/2018    Endovascular Repair Infrarenal Abdominal Aorta Aneurysm    OTHER SURGICAL HISTORY  03/02/2015    Cardiac Catheter His Ablation    TOTAL THYROIDECTOMY  05/14/2013    Thyroid Surgery Total Thyroidectomy       Family History   Problem Relation Name Age of Onset    Stroke Father      Heart attack Sister      Breast cancer Maternal Grandmother         No Known Allergies    Prior to Admission medications    Medication Sig Start Date End Date Taking? Authorizing Provider   acetaminophen (Tylenol) 325 mg tablet Take 1 tablet (325 mg) by  mouth every 4 hours if needed. 5/13/20   Historical Provider, MD   albuterol 90 mcg/actuation inhaler Inhale 2 puffs every 6 hours if needed. 12/3/13   Historical Provider, MD   amitriptyline (Elavil) 25 mg tablet TAKE ONE TABLET BY MOUTH AT BEDTIME 8/7/23   Ti Nolan MD   aspirin 81 mg EC tablet Take 1 tablet (81 mg) by mouth once daily. 5/28/20   Historical Provider, MD   atorvastatin (Lipitor) 40 mg tablet TAKE ONE TABLET BY MOUTH DAILY 11/10/23   Ti Nolan MD   budesonide-formoteroL (Symbicort) 160-4.5 mcg/actuation inhaler Inhale 2 puffs 2 times a day. 4/13/15   Historical Provider, MD   carvedilol (Coreg) 25 mg tablet TAKE ONE TABLET BY MOUTH TWICE A DAY WITH FOOD 11/28/23   Ti Nolan MD   cholecalciferol (Vitamin D-3) 125 MCG (5000 UT) capsule Take 1 capsule (125 mcg) by mouth once daily. 4/26/23 4/25/24  Ti Nolan MD   docusate sodium (Colace) 100 mg capsule Take 1 capsule (100 mg) by mouth 2 times a day as needed. 5/28/20   Historical Provider, MD   ferrous gluconate 324 (38 Fe) MG tablet TAKE ONE TABLET BY MOUTH DAILY 11/28/23   Ti Nolan MD   furosemide (Lasix) 40 mg tablet TAKE ONE TABLET BY MOUTH TWO TIMES A DAY 12/1/23   Ti Nolan MD   hydrALAZINE (Apresoline) 100 mg tablet TAKE ONE TABLET BY MOUTH THREE TIMES A DAY 12/11/23   Ti Nolan MD   isosorbide mononitrate ER (Imdur) 120 mg 24 hr tablet TAKE ONE TABLET BY MOUTH once DAILY 9/25/23   Ti Nolan MD   levothyroxine (Synthroid, Levoxyl) 200 mcg tablet TAKE ONE TABLET BY MOUTH EVERY DAY ALONG WITH A 25 MCG TABLET FOR A TOTAL DAILY DOSE  MCG 9/25/23   Ti Nolan MD   levothyroxine (Synthroid, Levoxyl) 50 mcg tablet TAKE 1 TABLET BY MOUTH EVERY DAY ALONG WITH  MCG TABLET TO EQUAL A TOTAL DAILY DOSE  MCG. 11/10/23   Ti Nolan MD   losartan (Cozaar) 100 mg tablet TAKE ONE TABLET BY MOUTH EVERY DAY 11/10/23   Ti Nolan MD   magnesium oxide (Mag-Ox) 400  mg (241.3 mg magnesium) tablet Take 1 tablet (400 mg) by mouth 2 times a day with meals. 7/24/23   Historical Provider, MD   meclizine (Antivert) 25 mg tablet TAKE ONE TABLET BY MOUTH AT BEDTIME 8/21/23   Ti Nolan MD   metOLazone (Zaroxolyn) 5 mg tablet Take 1 tablet (5 mg) by mouth. Twice a week 8/13/20   Historical Provider, MD   oxybutynin XL (Ditropan-XL) 15 mg 24 hr tablet Take 1 tablet (15 mg) by mouth once daily.    Historical Provider, MD   pantoprazole (ProtoNix) 40 mg EC tablet TAKE ONE TABLET BY MOUTH EVERY DAY 9/25/23   Ti Nolan MD   warfarin (Jantoven) 7.5 mg tablet TAKE ONE TABLET BY MOUTH EVERY DAY 12/1/23   Ti Nolan MD        PAT ROS:   Constitutional:   neg     no fever   no chills  Neuro/Psych:   Eyes:    use of corrective lenses  Ears:    vertigo  Nose:   neg    Mouth:   neg    Throat:   neg    Neck:   neg    Cardio:   neg    Respiratory:   neg    Endocrine:   neg    GI:   neg    :   neg    Musculoskeletal:    Uses walker and cane at home  neg    Hematologic:   neg    Skin:  neg        Physical Exam  Vitals reviewed.   Constitutional:       Appearance: Normal appearance. She is obese.   HENT:      Head: Normocephalic and atraumatic.      Nose: Nose normal.      Mouth/Throat:      Mouth: Mucous membranes are moist.      Pharynx: Oropharynx is clear.   Eyes:      Extraocular Movements: Extraocular movements intact.      Pupils: Pupils are equal, round, and reactive to light.   Cardiovascular:      Rate and Rhythm: Normal rate and regular rhythm.      Heart sounds: Normal heart sounds.   Pulmonary:      Effort: Pulmonary effort is normal.      Breath sounds: Normal breath sounds.      Comments: 2LNC  Abdominal:      General: Abdomen is flat. Bowel sounds are normal.      Palpations: Abdomen is soft.   Musculoskeletal:         General: Normal range of motion.      Cervical back: Normal range of motion and neck supple.      Comments: Lymphedma to lower extremities   Skin:      General: Skin is warm and dry.   Neurological:      Mental Status: She is alert and oriented to person, place, and time.   Psychiatric:         Mood and Affect: Mood normal.         Behavior: Behavior normal.         Thought Content: Thought content normal.         Judgment: Judgment normal.          PAT AIRWAY:   Airway:     Mallampati::  II    Neck ROM::  Full   States nothing removable      Visit Vitals  /79   Pulse 100   Temp 36.7 °C (98.1 °F) (Oral)       DASI Risk Score      Flowsheet Row Most Recent Value   DASI SCORE 10.7   METS Score (Will be calculated only when all the questions are answered) 4.1          Caprini DVT Assessment      Flowsheet Row Most Recent Value   DVT Score 13   Current Status COPD, Major surgery planned, lasting over 3 hours   History Prior major surgery, COPD   Age 60-75 years   BMI Greater than 50 (Venous stasis syndrome)          Modified Frailty Index      Flowsheet Row Most Recent Value   Modified Frailty Index Calculator .2727          CHADS2 Stroke Risk  Current as of about an hour ago        4% 3 - 100%: High Risk   2 - 3%: Medium Risk   0 - 2%: Low Risk     No Change          This score determines the patient's risk of having a stroke if the patient has atrial fibrillation.          Points Metrics   1 Has Congestive Heart Failure:  Yes     Patients with congestive heart failure get 1 point.    Current as of about an hour ago   1 Has Hypertension:  Yes     Patients with hypertension get 1 point.    Current as of about an hour ago   0 Age:  68     Patients who are 75 years of age or older get 1 point.    Current as of about an hour ago   0 Has Diabetes:  No     Patients with diabetes get 1 point.    Current as of about an hour ago   0 Had Stroke:  No  Had TIA:  No  Had Thromboembolism:  No     Patients who have had a stroke, TIA, or thromboembolism get 2 points.    Current as of about an hour ago             Revised Cardiac Risk Index      Flowsheet Row Most Recent Value    Revised Cardiac Risk Calculator 2          Apfel Simplified Score      Flowsheet Row Most Recent Value   Apfel Simplified Score Calculator 3          Risk Analysis Index Results This Encounter    No data found in the last 1 encounters.       Stop Bang Score      Flowsheet Row Most Recent Value   Do you snore loudly? 0   Do you often feel tired or fatigued after your sleep? 0   Has anyone ever observed you stop breathing in your sleep? 0   Do you have or are you being treated for high blood pressure? 1   Recent BMI (Calculated) 50.9   Is BMI greater than 35 kg/m2? 1=Yes   Age older than 50 years old? 1=Yes   Is your neck circumference greater than 17 inches (Male) or 16 inches (Female)? 0   Gender - Male 0=No   STOP-BANG Total Score 3          Assessment and Plan:     Neuro:   No neurologic diagnoses, however, the patient is at an increased risk for post operative delirium secondary to age >/= 65, decreased functional status, type and duration of surgery    The patient is at an increased risk for perioperative stroke secondary to afib,  cardiac disease, preoperative interrupton of antithrombotic, increased age, HTN, HLD, female, and vascular surgery, general anesthesia, hypercoagulable state, op time >2.5 hours)    Patient given information on brain exercises and delirium prevention.    HEENT/Airway  No diagnosis or significant findings on chart review or clinical presentation and evaluation.    Cardiovascular    CARDS EVAL  The patient follows with cardiology, Dr. Callejas [phone] . Patient scheduled for an appointment on 1-2-24 for cardiac clearance    CHF  Currently taking furosemide, carvediolol (Coreg), hydralazine (Apresoline), isosorbide mononitrate, and losartan (Cozaar)    Atrial Fib  Currently on warfarin. Managed by PCP Dr. Ti Nolan  1-5-24: Spoke with Katja Chisholm, Pharm D that manages her warfarin at the anticoagulation clinic. States patient has bridged with lovenox  previously.    Hyperlipidemia  Currently controlled with atorvastatin (Lipitor) 4-4-23: LDL 97    Hypertension  Currently taking carvediolol, hydralazine, isosorbide mononitrate, and losartan. BP today 133/79      RCRI  The patient meets 2 RCRI criteria and therefore has a 6.6% risk (elevated) of major adverse cardiac complications.  METS  The patient's functional capacity capacity is greater than 4 METS.  MACE  30-day risk for MACE of 2 predictors, 10.1% risk for cardiac death, nonfatal myocardial infarction, and nonfatal cardiac arrest  SAMY  2.8% risk for 96th to 97th percentile    EKG  12-28-24: EKG showed atrial fib with PVC    Pulmonary   COPD  Currently on 2LNC, albuterol inhaler PRN and budesonide-formoterol (Symbicort). States does get short of breath with prolonged activity    Sleep Apnea  Uses a CPAP at home    STOP BANG Score of 3, which places patient at intermediate risk for having BECKI.  ARISCAT 58, High, 42.1% risk of in-hospital postoperative pulmonary complications  PRODIGY 20, high of respiratory depression episode.    Patient given information on deep breathing exercises.     Renal  No diagnosis or significant findings on chart review or clinical presentation and evaluation.    DPS  2 points medium risk for perioperative acute kidney injury.     Endocrine    Thyroid Disease Evaluation  Currently taking levothyroxine.     Hematology  No diagnosis or significant findings on chart review or clinical presentation and evaluation.    Anticoagulation management  The patient is currently receiving anticoagulation therapy for afib.  Caprini score 13, high risk of VTE    Transfusion Evaluation  A type and screen was obtained given the likelihood for perioperative transfusion of blood or blood products.    Patient given information on DVT prevention.     GI  No diagnosis or significant findings on chart review or clinical presentation and evaluation.    Eat 10- 0  Apfel: 3 points 61% risk for post operative  nausea/vomiting.     Genitourinary  No diagnosis or significant findings on chart review or clinical presentation and evaluation.    Incontinent of urine. Wears pads and briefs. Takes oxybutynin to manage symptoms    ID  No diagnosis or significant findings on chart review or clinical presentation and evaluation.    MRSA swab ordered  UA with reflex culture ordered  Chlorhexidine mouth wash and body wash ordered    Musculoskeletal    Uses cane and walker at home. Uses wheelchair when outside of the home    -Preoperative medication instructions were provided and reviewed with the patient.  Any additional testing or evaluation was explained to the patient.  NPO Instructions were discussed, and the patient's questions were answered prior to conclusion of this encounter    Labs ordered:    MRSA swab, UA with reflex pending    12-29-23: Dr Gant notified of platelets and UA results     Results for orders placed or performed in visit on 12/28/23   Staphylococcus aureus/MRSA colonization, Culture    Specimen: Nares/Axilla/Groin; Swab   Result Value Ref Range    Staph/MRSA Screen Culture No Staphylococcus aureus isolated    Urine Culture    Specimen: Clean Catch/Voided; Urine   Result Value Ref Range    Urine Culture >100,000 Escherichia coli (A)        Susceptibility    Escherichia coli - MICROSCAN     Ampicillin  Susceptible      Cefazolin  Susceptible      Cefazolin (uncomplicated UTIs only)  Susceptible      Ciprofloxacin  Susceptible      Gentamicin  Susceptible      Nitrofurantoin  Susceptible      Piperacillin/Tazobactam  Susceptible      Trimethoprim/Sulfamethoxazole  Susceptible    CBC   Result Value Ref Range    WBC 5.0 4.4 - 11.3 x10*3/uL    nRBC 0.0 0.0 - 0.0 /100 WBCs    RBC 3.34 (L) 4.00 - 5.20 x10*6/uL    Hemoglobin 10.0 (L) 12.0 - 16.0 g/dL    Hematocrit 32.1 (L) 36.0 - 46.0 %    MCV 96 80 - 100 fL    MCH 29.9 26.0 - 34.0 pg    MCHC 31.2 (L) 32.0 - 36.0 g/dL    RDW 14.3 11.5 - 14.5 %    Platelets 142 (L) 150 -  450 x10*3/uL   Basic Metabolic Panel   Result Value Ref Range    Glucose 102 (H) 74 - 99 mg/dL    Sodium 143 136 - 145 mmol/L    Potassium 3.5 3.5 - 5.3 mmol/L    Chloride 100 98 - 107 mmol/L    Bicarbonate 35 (H) 21 - 32 mmol/L    Anion Gap 12 10 - 20 mmol/L    Urea Nitrogen 19 6 - 23 mg/dL    Creatinine 0.88 0.50 - 1.05 mg/dL    eGFR 71 >60 mL/min/1.73m*2    Calcium 10.3 8.6 - 10.6 mg/dL   Type And Screen   Result Value Ref Range    ABO TYPE A     Rh TYPE POS     ANTIBODY SCREEN NEG    Coagulation Screen   Result Value Ref Range    Protime 40.8 (H) 9.8 - 12.8 seconds    INR 3.6 (H) 0.9 - 1.1    aPTT 53 (H) 27 - 38 seconds   Hemoglobin A1C   Result Value Ref Range    Hemoglobin A1C 5.5 see below %    Estimated Average Glucose 111 Not Established mg/dL   Urinalysis with Reflex Culture and Microscopic   Result Value Ref Range    Color, Urine Yellow Straw, Yellow    Appearance, Urine Hazy (N) Clear    Specific Gravity, Urine 1.012 1.005 - 1.035    pH, Urine 6.0 5.0, 5.5, 6.0, 6.5, 7.0, 7.5, 8.0    Protein, Urine NEGATIVE NEGATIVE mg/dL    Glucose, Urine NEGATIVE NEGATIVE mg/dL    Blood, Urine NEGATIVE NEGATIVE    Ketones, Urine NEGATIVE NEGATIVE mg/dL    Bilirubin, Urine NEGATIVE NEGATIVE    Urobilinogen, Urine <2.0 <2.0 mg/dL    Nitrite, Urine POSITIVE (A) NEGATIVE    Leukocyte Esterase, Urine SMALL (1+) (A) NEGATIVE   Extra Urine Gray Tube   Result Value Ref Range    Extra Tube Hold for add-ons.    Microscopic Only, Urine   Result Value Ref Range    WBC, Urine 6-10 (A) 1-5, NONE /HPF    RBC, Urine 1-2 NONE, 1-2, 3-5 /HPF    Squamous Epithelial Cells, Urine 1-9 (SPARSE) Reference range not established. /HPF    Bacteria, Urine 4+ (A) NONE SEEN /HPF    Mucus, Urine 1+ Reference range not established. /LPF

## 2023-12-28 NOTE — PREPROCEDURE INSTRUCTIONS
NPO Instructions:    Do not eat any food after midnight the night before your surgery/procedure.  You may have 10 ounces clear liquids until TWO hours before surgery/procedure. This includes water, black tea/coffee, (no milk or cream) apple juice and electrolyte drinks (Gatorade).  You may chew gum up to TWO hours before your surgery/procedure.    Additional Instructions:     Seven/Six Days before Surgery:  We have sent a prescription for Hibiclens soap to your preferred pharmacy.  If you have not already, Please  your prescription and start using five days before surgery.  Follow the instruction sheet provided to you at your CPM/PAT appointment.  Review your medication instructions, stop indicated medications    Five Days before Surgery:  Review your medication instructions, stop indicated medications  Begin using your Hibiclens    Three Days before Surgery:  Review your medication instructions, stop indicated medications    The Day before Surgery:  Review your medication instructions, stop indicated medications  You will be contacted regarding the time of your arrival to facility and surgery time  Do not eat any food after Midnight    Day of Surgery:  Review your medication instructions, take indicated medications  If you have diabetes, please check your fasting blood sugar upon awakening.  If fasting blood sugar is <80 mg/dl, drink 100 ml of apple juice, time limit of 2 hours before  You may have clear liquids until TWO hours before surgery/procedure.  This includes water, black tea/coffee, (no milk or cream) apple juice and electrolyte drinks (Gatorade)  You may chew gum up to TWO hours before your surgery/procedure  Wear  comfortable loose fitting clothing  Do not use moisturizers, creams, lotions or perfume  All jewelry and valuables should be left at home    Avoid herbal supplements, multivitamins and NSAIDS (non-steroidal anti-inflammatory drugs) such as Advil, Aleve, Ibuprofen, Naproxen, Excedrin,  Meloxicam or Celebrex for at least 7 days prior to surgery. May take Tylenol as needed.    Please keep your appointment with Dr. Callejas on 1-2-24    Please get the PT/INR on 1-16-24    Lovenox Bridging Instructions  1-11-24: Last dose of warfarin and aspirin  1-12-24: No warfarin, aspirin, or lovenox  Begin using lovenox as follows:  1-13-24 7am and 7pm  1-14-24 7am and 7pm  1-15-24 7am and 7pm  1-16-24: 7am dose only  1-17-24: Day of surgery    Nano Potter, MSN, NP-C, CNP  Family Nurse Practitioner  Department of Anesthesiology and Perioperative Medicine  Main phone: [phone]  Fax :[phone]  Direct: [phone]

## 2023-12-28 NOTE — PROGRESS NOTES
Subjective     Emma Villela is a 68 y.o. female who presents for anticoagulation follow up.     Location: [facility]     Referring Physician: Dr. Ti Nolan  INR Goal: 2.0-3.0  Indication: Atrial Fibrillation/Atrial Flutter    Bleeding signs/symptoms: No  Bruising: No   Major bleeding event: No  Thrombosis signs/symptoms: No  Thromboembolic event: No  Missed doses: No  Extra doses: No  Medication changes: No  Dietary changes: Yes  less greens due to holiday  Change in health: No  Change in activity: No  Alcohol: No  Other concerns: No  Upcoming Surgeries: no      Current Outpatient Medications on File Prior to Visit   Medication Sig Dispense Refill    acetaminophen (Tylenol) 325 mg tablet Take 1 tablet (325 mg) by mouth every 4 hours if needed.      albuterol 90 mcg/actuation inhaler Inhale 2 puffs every 6 hours if needed.      amitriptyline (Elavil) 25 mg tablet TAKE ONE TABLET BY MOUTH AT BEDTIME 90 tablet 1    aspirin 81 mg EC tablet Take 1 tablet (81 mg) by mouth once daily.      atorvastatin (Lipitor) 40 mg tablet TAKE ONE TABLET BY MOUTH DAILY 90 tablet 0    budesonide-formoteroL (Symbicort) 160-4.5 mcg/actuation inhaler Inhale 2 puffs 2 times a day.      carvedilol (Coreg) 25 mg tablet TAKE ONE TABLET BY MOUTH TWICE A DAY WITH FOOD 180 tablet 0    chlorhexidine (Hibiclens) 4 % external liquid Apply topically 2 times a day for 5 days. 473 mL 0    chlorhexidine (Peridex) 0.12 % solution Use 15 mL in the mouth or throat see administration instructions for 2 doses. Use the night before and morning of surgery. 473 mL 0    cholecalciferol (Vitamin D-3) 125 MCG (5000 UT) capsule Take 1 capsule (125 mcg) by mouth once daily. 90 capsule 3    docusate sodium (Colace) 100 mg capsule Take 1 capsule (100 mg) by mouth 2 times a day as needed.      ferrous gluconate 324 (38 Fe) MG tablet TAKE ONE TABLET BY MOUTH DAILY 90 tablet 0    furosemide (Lasix) 40 mg tablet TAKE ONE TABLET BY  MOUTH TWO TIMES A  tablet 0    hydrALAZINE (Apresoline) 100 mg tablet TAKE ONE TABLET BY MOUTH THREE TIMES A  tablet 0    isosorbide mononitrate ER (Imdur) 120 mg 24 hr tablet TAKE ONE TABLET BY MOUTH once DAILY 90 tablet 1    levothyroxine (Synthroid, Levoxyl) 200 mcg tablet TAKE ONE TABLET BY MOUTH EVERY DAY ALONG WITH A 25 MCG TABLET FOR A TOTAL DAILY DOSE  MCG 90 tablet 1    levothyroxine (Synthroid, Levoxyl) 50 mcg tablet TAKE 1 TABLET BY MOUTH EVERY DAY ALONG WITH  MCG TABLET TO EQUAL A TOTAL DAILY DOSE  MCG. 90 tablet 0    losartan (Cozaar) 100 mg tablet TAKE ONE TABLET BY MOUTH EVERY DAY 90 tablet 0    magnesium oxide (Mag-Ox) 400 mg (241.3 mg magnesium) tablet Take 1 tablet (400 mg) by mouth 2 times a day with meals.      meclizine (Antivert) 25 mg tablet TAKE ONE TABLET BY MOUTH AT BEDTIME 30 tablet 2    metOLazone (Zaroxolyn) 5 mg tablet Take 1 tablet (5 mg) by mouth. Twice a week      oxybutynin XL (Ditropan-XL) 15 mg 24 hr tablet Take 1 tablet (15 mg) by mouth once daily.      pantoprazole (ProtoNix) 40 mg EC tablet TAKE ONE TABLET BY MOUTH EVERY DAY 90 tablet 1    warfarin (Jantoven) 7.5 mg tablet TAKE ONE TABLET BY MOUTH EVERY DAY 90 tablet 1     No current facility-administered medications on file prior to visit.        Objective   Anticoagulation Summary  As of 12/28/2023      INR goal:  2.0-3.0   TTR:  67.8 % (1.5 mo)   INR used for dosing:  3.30 (12/28/2023)   Weekly warfarin total:  52.5 mg             Lab Results   Component Value Date    INR 3.6 (H) 12/28/2023    INR 3.30 (A) 12/28/2023    INR 3.20 (A) 12/21/2023    INR 2.70 (N) 12/14/2023    PROTIME 40.8 (H) 12/28/2023    PROTIME 18.4 (H) 06/22/2020    PROTIME 18.5 (H) 06/19/2020    PROTIME 21.2 (H) 06/18/2020       Assessment/Plan   Current INR is Supratherapeutic at 3.3. Previous INR was Supratherapeutic at 3.2. Patient states consuming less greens over holiday weekend, which is consistent with prior visit.  Advised patient to take 3.75 mg today (12/28), then resume current warfarin regimen of 7.5 mg every day. If patient does not increase green vegetable consumption or INR remains supratherapeutic at follow up, will plan to decrease weekly warfarin dose to 3.75 mg twice weekly and 7.5 mg all other days    Follow Up: 1 week    Katja Chisholm, NolaD, BCPS

## 2023-12-29 LAB
ATRIAL RATE: 394 BPM
Q ONSET: 226 MS
QRS COUNT: 15 BEATS
QRS DURATION: 104 MS
QT INTERVAL: 374 MS
QTC CALCULATION(BAZETT): 460 MS
QTC FREDERICIA: 430 MS
R AXIS: -18 DEGREES
STAPHYLOCOCCUS SPEC CULT: NORMAL
T AXIS: 80 DEGREES
T OFFSET: 413 MS
VENTRICULAR RATE: 91 BPM

## 2023-12-30 LAB
BACTERIA UR CULT: ABNORMAL
INR IN PPP BY COAGULATION ASSAY EXTERNAL: 3.3 (ref 2–3)
PROTHROMBIN TIME (PT) IN PPP BY COAGULATION ASSAY EXTERNAL: ABNORMAL SECONDS

## 2024-01-02 ENCOUNTER — ANTICOAGULATION - WARFARIN VISIT (OUTPATIENT)
Dept: PHARMACY | Facility: HOSPITAL | Age: 69
End: 2024-01-02
Payer: MEDICARE

## 2024-01-02 DIAGNOSIS — I48.91 ATRIAL FIBRILLATION, UNSPECIFIED TYPE (MULTI): Primary | ICD-10-CM

## 2024-01-02 NOTE — PROGRESS NOTES
SUBJECTIVE    Emma Villela is a 68 y.o. female who is enrolled in the [facility] for anticoagulation management.     Referring Physician: Dr. Ti Nolan  INR Goal: 2.0-3.0  Indication: Atrial Fibrillation/Atrial Flutter    OBJECTIVE        ASSESSMENT AND PLAN     Current INR is Supratherapeutic at 3.3. Previous INR was Therapeutic at 2.7. Unable to contact patient as INR result was received on a weekend day. No change in warfarin regimen. Advised patient to check INR again via home test.     Katja Chisholm, PharmD, BCPS       Xolair Counseling:  Patient informed of potential adverse effects including but not limited to fever, muscle aches, rash and allergic reactions.  The patient verbalized understanding of the proper use and possible adverse effects of Xolair.  All of the patient's questions and concerns were addressed.

## 2024-01-03 DIAGNOSIS — N30.00 ACUTE CYSTITIS WITHOUT HEMATURIA: Primary | ICD-10-CM

## 2024-01-03 RX ORDER — NITROFURANTOIN 25; 75 MG/1; MG/1
100 CAPSULE ORAL 2 TIMES DAILY
Qty: 10 CAPSULE | Refills: 0 | Status: SHIPPED | OUTPATIENT
Start: 2024-01-03 | End: 2024-01-08

## 2024-01-04 ENCOUNTER — ANTICOAGULATION - WARFARIN VISIT (OUTPATIENT)
Dept: PHARMACY | Facility: HOSPITAL | Age: 69
End: 2024-01-04
Payer: MEDICARE

## 2024-01-04 DIAGNOSIS — I48.91 ATRIAL FIBRILLATION, UNSPECIFIED TYPE (MULTI): Primary | ICD-10-CM

## 2024-01-04 NOTE — PROGRESS NOTES
Subjective     Emma Villela is a 68 y.o. female who presents for anticoagulation follow up.     Location: [facility]     Referring Physician: Dr. Ti Nolan  INR Goal: 2.0-3.0  Indication: Atrial Fibrillation/Atrial Flutter    Bleeding signs/symptoms: No  Bruising: No   Major bleeding event: No  Thrombosis signs/symptoms: No  Thromboembolic event: No  Missed doses: No  Extra doses: No  Medication changes: No  Dietary changes: No  Change in health: No  Change in activity: No  Alcohol: No  Other concerns: No  Upcoming Surgeries: yes; CHADsVASc = 4; HASBLED = 2      Current Outpatient Medications on File Prior to Visit   Medication Sig Dispense Refill    acetaminophen (Tylenol) 325 mg tablet Take 1 tablet (325 mg) by mouth every 4 hours if needed.      albuterol 90 mcg/actuation inhaler Inhale 2 puffs every 6 hours if needed.      amitriptyline (Elavil) 25 mg tablet TAKE ONE TABLET BY MOUTH AT BEDTIME 90 tablet 1    aspirin 81 mg EC tablet Take 1 tablet (81 mg) by mouth once daily.      atorvastatin (Lipitor) 40 mg tablet TAKE ONE TABLET BY MOUTH DAILY 90 tablet 0    budesonide-formoteroL (Symbicort) 160-4.5 mcg/actuation inhaler Inhale 2 puffs 2 times a day.      carvedilol (Coreg) 25 mg tablet TAKE ONE TABLET BY MOUTH TWICE A DAY WITH FOOD 180 tablet 0    [] chlorhexidine (Hibiclens) 4 % external liquid Apply topically 2 times a day for 5 days. 473 mL 0    chlorhexidine (Peridex) 0.12 % solution Use 15 mL in the mouth or throat see administration instructions for 2 doses. Use the night before and morning of surgery. 473 mL 0    cholecalciferol (Vitamin D-3) 125 MCG (5000 UT) capsule Take 1 capsule (125 mcg) by mouth once daily. 90 capsule 3    docusate sodium (Colace) 100 mg capsule Take 1 capsule (100 mg) by mouth 2 times a day as needed.      ferrous gluconate 324 (38 Fe) MG tablet TAKE ONE TABLET BY MOUTH DAILY 90 tablet 0    furosemide (Lasix) 40 mg tablet TAKE ONE  TABLET BY MOUTH TWO TIMES A  tablet 0    hydrALAZINE (Apresoline) 100 mg tablet TAKE ONE TABLET BY MOUTH THREE TIMES A  tablet 0    isosorbide mononitrate ER (Imdur) 120 mg 24 hr tablet TAKE ONE TABLET BY MOUTH once DAILY 90 tablet 1    levothyroxine (Synthroid, Levoxyl) 200 mcg tablet TAKE ONE TABLET BY MOUTH EVERY DAY ALONG WITH A 25 MCG TABLET FOR A TOTAL DAILY DOSE  MCG 90 tablet 1    levothyroxine (Synthroid, Levoxyl) 50 mcg tablet TAKE 1 TABLET BY MOUTH EVERY DAY ALONG WITH  MCG TABLET TO EQUAL A TOTAL DAILY DOSE  MCG. 90 tablet 0    losartan (Cozaar) 100 mg tablet TAKE ONE TABLET BY MOUTH EVERY DAY 90 tablet 0    magnesium oxide (Mag-Ox) 400 mg (241.3 mg magnesium) tablet Take 1 tablet (400 mg) by mouth 2 times a day with meals.      meclizine (Antivert) 25 mg tablet TAKE ONE TABLET BY MOUTH AT BEDTIME 30 tablet 2    metOLazone (Zaroxolyn) 5 mg tablet Take 1 tablet (5 mg) by mouth. Twice a week      nitrofurantoin, macrocrystal-monohydrate, (Macrobid) 100 mg capsule Take 1 capsule (100 mg) by mouth 2 times a day for 5 days. 10 capsule 0    oxybutynin XL (Ditropan-XL) 15 mg 24 hr tablet Take 1 tablet (15 mg) by mouth once daily.      pantoprazole (ProtoNix) 40 mg EC tablet TAKE ONE TABLET BY MOUTH EVERY DAY 90 tablet 1    warfarin (Jantoven) 7.5 mg tablet TAKE ONE TABLET BY MOUTH EVERY DAY 90 tablet 1     No current facility-administered medications on file prior to visit.        Objective   Anticoagulation Summary  As of 2024      INR goal:  2.0-3.0   TTR:  64.3 % (1.8 mo)   INR used for dosin.60 (2024)   Weekly warfarin total:  52.5 mg             Lab Results   Component Value Date    INR 2.60 (N) 2024    INR 3.30 (A) 2023    INR 3.6 (H) 2023    INR 3.30 (A) 2023    PROTIME 40.8 (H) 2023    PROTIME 18.4 (H) 2020    PROTIME 18.5 (H) 2020    PROTIME 21.2 (H) 2020       Assessment/Plan   Current INR is Therapeutic at  2.6. Previous INR was Supratherapeutic at 3.3. No changes reported from previous visit. Plan to continue current warfarin regimen of 7.5 mg every day per patient preference. If INR is elevated next week, plan for permanent dose reduction to 3.75 mg on Thurs and 7.5 mg all other days.     Spoke to patient regarding upcoming procedure on 1/17/24. Discussed perioperative plan with provider, Nano Potter. Plan to hold warfarin x5 days prior to procedure and initiate parenteral anticoagulation - recommended enoxaparin 150 mg Q12H based on current weight and renal function. Surgical team will be managing perioperative anticoagulation but writer will continue to follow.     Follow Up: 1 week home INR check     Katja Chisholm, NolaD, BCPS

## 2024-01-05 ENCOUNTER — TELEPHONE (OUTPATIENT)
Dept: VASCULAR SURGERY | Facility: CLINIC | Age: 69
End: 2024-01-05
Payer: MEDICARE

## 2024-01-05 RX ORDER — ENOXAPARIN SODIUM 150 MG/ML
150 INJECTION SUBCUTANEOUS 2 TIMES DAILY
Qty: 7 EACH | Refills: 0 | Status: SHIPPED | OUTPATIENT
Start: 2024-01-13 | End: 2024-01-21 | Stop reason: HOSPADM

## 2024-01-05 ASSESSMENT — ENCOUNTER SYMPTOMS
ENDOCRINE NEGATIVE: 1
MUSCULOSKELETAL NEGATIVE: 1
CONSTITUTIONAL NEGATIVE: 1
VERTIGO: 1
CHILLS: 0
CARDIOVASCULAR NEGATIVE: 1
FEVER: 0
GASTROINTESTINAL NEGATIVE: 1
RESPIRATORY NEGATIVE: 1
NECK NEGATIVE: 1

## 2024-01-05 NOTE — TELEPHONE ENCOUNTER
I have had the pleasure of speaking with Mrs. Villela this morning.     Our office has received cardiac clearance form Mrs. Loving's  cardiologist  Dr. John Callejas.   ( Letter scanned into the EMR) .     Following review with Dr. Walton,  I have informed the patient that she is to hold her Coumadin for 5 days before the planned surgical procedure, starting 1/12/2024.     The patient has verbalized an understanding of the instructions.     YANDEL Hanks RN

## 2024-01-08 ENCOUNTER — ANTICOAGULATION - WARFARIN VISIT (OUTPATIENT)
Dept: PHARMACY | Facility: HOSPITAL | Age: 69
End: 2024-01-08
Payer: MEDICARE

## 2024-01-08 DIAGNOSIS — I48.91 ATRIAL FIBRILLATION, UNSPECIFIED TYPE (MULTI): Primary | ICD-10-CM

## 2024-01-08 LAB
INR IN PPP BY COAGULATION ASSAY EXTERNAL: 2.4 (ref 2–3)
PROTHROMBIN TIME (PT) IN PPP BY COAGULATION ASSAY EXTERNAL: ABNORMAL SECONDS

## 2024-01-08 NOTE — PROGRESS NOTES
SUBJECTIVE    Emma Villela is a 68 y.o. female who is enrolled in the [facility] for anticoagulation management.     Referring Physician: Dr. Ti Nolan  INR Goal: 2.0-3.0  Indication: Atrial Fibrillation/Atrial Flutter    OBJECTIVE        ASSESSMENT AND PLAN     Current INR is Therapeutic at 2.4. Previous INR was Therapeutic at 2.6 (patient did not call result into mdINR). Plan to continue current warfarin regimen of 7.5 mg daily. Contacted patient to reiterate perioperative bridging instructions. Patient verbalized understanding. Plan to check INR after hospital discharge following procedure.     Katja Chisholm, PharmD, BCPS

## 2024-01-10 ENCOUNTER — APPOINTMENT (OUTPATIENT)
Dept: PULMONOLOGY | Facility: CLINIC | Age: 69
End: 2024-01-10
Payer: MEDICARE

## 2024-01-10 ENCOUNTER — LAB (OUTPATIENT)
Dept: LAB | Facility: LAB | Age: 69
End: 2024-01-10
Payer: MEDICARE

## 2024-01-10 DIAGNOSIS — N30.00 ACUTE CYSTITIS WITHOUT HEMATURIA: ICD-10-CM

## 2024-01-10 LAB
APPEARANCE UR: ABNORMAL
BILIRUB UR STRIP.AUTO-MCNC: NEGATIVE MG/DL
COLOR UR: YELLOW
GLUCOSE UR STRIP.AUTO-MCNC: NEGATIVE MG/DL
HOLD SPECIMEN: NORMAL
KETONES UR STRIP.AUTO-MCNC: NEGATIVE MG/DL
LEUKOCYTE ESTERASE UR QL STRIP.AUTO: NEGATIVE
NITRITE UR QL STRIP.AUTO: NEGATIVE
PH UR STRIP.AUTO: 7 [PH]
PROT UR STRIP.AUTO-MCNC: NEGATIVE MG/DL
RBC # UR STRIP.AUTO: NEGATIVE /UL
SP GR UR STRIP.AUTO: 1.01
UROBILINOGEN UR STRIP.AUTO-MCNC: <2 MG/DL

## 2024-01-16 ENCOUNTER — ANESTHESIA EVENT (OUTPATIENT)
Dept: CARDIOLOGY | Facility: HOSPITAL | Age: 69
DRG: 253 | End: 2024-01-16
Payer: MEDICARE

## 2024-01-17 ENCOUNTER — HOSPITAL ENCOUNTER (INPATIENT)
Facility: HOSPITAL | Age: 69
LOS: 4 days | Discharge: HOME | DRG: 253 | End: 2024-01-21
Attending: SURGERY | Admitting: SURGERY
Payer: MEDICARE

## 2024-01-17 ENCOUNTER — ANESTHESIA (OUTPATIENT)
Dept: CARDIOLOGY | Facility: HOSPITAL | Age: 69
DRG: 253 | End: 2024-01-17
Payer: MEDICARE

## 2024-01-17 DIAGNOSIS — T82.310A TYPE I ENDOLEAK OF AORTIC GRAFT (CMS-HCC): Primary | ICD-10-CM

## 2024-01-17 DIAGNOSIS — I71.40 ABDOMINAL AORTIC ANEURYSM (AAA) WITHOUT RUPTURE, UNSPECIFIED PART (CMS-HCC): ICD-10-CM

## 2024-01-17 DIAGNOSIS — Z09 FOLLOW-UP EXAM: ICD-10-CM

## 2024-01-17 DIAGNOSIS — I71.43 INFRARENAL ABDOMINAL AORTIC ANEURYSM (AAA) WITHOUT RUPTURE (CMS-HCC): ICD-10-CM

## 2024-01-17 DIAGNOSIS — I48.20 CHRONIC ATRIAL FIBRILLATION (MULTI): ICD-10-CM

## 2024-01-17 LAB
ABO GROUP (TYPE) IN BLOOD: NORMAL
ANTIBODY SCREEN: NORMAL
APTT PPP: 53 SECONDS (ref 27–38)
INR PPP: 1.3 (ref 0.9–1.1)
PROTHROMBIN TIME: 14.2 SECONDS (ref 9.8–12.8)
RH FACTOR (ANTIGEN D): NORMAL

## 2024-01-17 PROCEDURE — 37236 OPEN/PERQ PLACE STENT 1ST: CPT | Performed by: SURGERY

## 2024-01-17 PROCEDURE — 3700000001 HC GENERAL ANESTHESIA TIME - INITIAL BASE CHARGE: Performed by: SURGERY

## 2024-01-17 PROCEDURE — 7100000001 HC RECOVERY ROOM TIME - INITIAL BASE CHARGE: Performed by: SURGERY

## 2024-01-17 PROCEDURE — 2500000005 HC RX 250 GENERAL PHARMACY W/O HCPCS

## 2024-01-17 PROCEDURE — 7100000002 HC RECOVERY ROOM TIME - EACH INCREMENTAL 1 MINUTE: Performed by: SURGERY

## 2024-01-17 PROCEDURE — 36415 COLL VENOUS BLD VENIPUNCTURE: CPT | Performed by: SURGERY

## 2024-01-17 PROCEDURE — 2780000003 HC OR 278 NO HCPCS: Performed by: SURGERY

## 2024-01-17 PROCEDURE — 04WY3DZ REVISION OF INTRALUMINAL DEVICE IN LOWER ARTERY, PERCUTANEOUS APPROACH: ICD-10-PCS | Performed by: SURGERY

## 2024-01-17 PROCEDURE — 2550000001 HC RX 255 CONTRASTS: Performed by: SURGERY

## 2024-01-17 PROCEDURE — 2500000004 HC RX 250 GENERAL PHARMACY W/ HCPCS (ALT 636 FOR OP/ED): Performed by: STUDENT IN AN ORGANIZED HEALTH CARE EDUCATION/TRAINING PROGRAM

## 2024-01-17 PROCEDURE — 1100000001 HC PRIVATE ROOM DAILY

## 2024-01-17 PROCEDURE — C1876 STENT, NON-COA/NON-COV W/DEL: HCPCS | Performed by: SURGERY

## 2024-01-17 PROCEDURE — A4217 STERILE WATER/SALINE, 500 ML: HCPCS | Performed by: SURGERY

## 2024-01-17 PROCEDURE — C1769 GUIDE WIRE: HCPCS | Performed by: SURGERY

## 2024-01-17 PROCEDURE — 04H93DZ INSERTION OF INTRALUMINAL DEVICE INTO RIGHT RENAL ARTERY, PERCUTANEOUS APPROACH: ICD-10-PCS | Performed by: SURGERY

## 2024-01-17 PROCEDURE — 2720000007 HC OR 272 NO HCPCS: Performed by: SURGERY

## 2024-01-17 PROCEDURE — C1760 CLOSURE DEV, VASC: HCPCS | Performed by: SURGERY

## 2024-01-17 PROCEDURE — 85610 PROTHROMBIN TIME: CPT | Performed by: SURGERY

## 2024-01-17 PROCEDURE — 37242 VASC EMBOLIZE/OCCLUDE ARTERY: CPT | Performed by: SURGERY

## 2024-01-17 PROCEDURE — C1874 STENT, COATED/COV W/DEL SYS: HCPCS | Performed by: SURGERY

## 2024-01-17 PROCEDURE — 2500000001 HC RX 250 WO HCPCS SELF ADMINISTERED DRUGS (ALT 637 FOR MEDICARE OP): Performed by: STUDENT IN AN ORGANIZED HEALTH CARE EDUCATION/TRAINING PROGRAM

## 2024-01-17 PROCEDURE — C1773 RET DEV, INSERTABLE: HCPCS | Performed by: SURGERY

## 2024-01-17 PROCEDURE — 34711 DLYD PLMT XTN PROSTH EA ADDL: CPT | Performed by: SURGERY

## 2024-01-17 PROCEDURE — 2500000005 HC RX 250 GENERAL PHARMACY W/O HCPCS: Performed by: ANESTHESIOLOGIST ASSISTANT

## 2024-01-17 PROCEDURE — 2500000004 HC RX 250 GENERAL PHARMACY W/ HCPCS (ALT 636 FOR OP/ED): Performed by: SURGERY

## 2024-01-17 PROCEDURE — C1889 IMPLANT/INSERT DEVICE, NOC: HCPCS | Performed by: SURGERY

## 2024-01-17 PROCEDURE — 34705 EVAC RPR A-BIILIAC NDGFT: CPT | Performed by: SURGERY

## 2024-01-17 PROCEDURE — 2500000004 HC RX 250 GENERAL PHARMACY W/ HCPCS (ALT 636 FOR OP/ED): Performed by: ANESTHESIOLOGIST ASSISTANT

## 2024-01-17 PROCEDURE — 2500000005 HC RX 250 GENERAL PHARMACY W/O HCPCS: Performed by: STUDENT IN AN ORGANIZED HEALTH CARE EDUCATION/TRAINING PROGRAM

## 2024-01-17 PROCEDURE — 3700000002 HC GENERAL ANESTHESIA TIME - EACH INCREMENTAL 1 MINUTE: Performed by: SURGERY

## 2024-01-17 PROCEDURE — C1887 CATHETER, GUIDING: HCPCS | Performed by: SURGERY

## 2024-01-17 PROCEDURE — C1894 INTRO/SHEATH, NON-LASER: HCPCS | Performed by: SURGERY

## 2024-01-17 PROCEDURE — 2500000004 HC RX 250 GENERAL PHARMACY W/ HCPCS (ALT 636 FOR OP/ED)

## 2024-01-17 PROCEDURE — 34710 DLYD PLMT XTN PROSTH 1ST VSL: CPT | Performed by: SURGERY

## 2024-01-17 PROCEDURE — 85347 COAGULATION TIME ACTIVATED: CPT

## 2024-01-17 PROCEDURE — 86901 BLOOD TYPING SEROLOGIC RH(D): CPT | Performed by: SURGERY

## 2024-01-17 DEVICE — IMPLANTABLE DEVICE: Type: IMPLANTABLE DEVICE | Site: ARTERIAL | Status: FUNCTIONAL

## 2024-01-17 DEVICE — IMPLANTABLE DEVICE: Type: IMPLANTABLE DEVICE | Site: AORTA | Status: FUNCTIONAL

## 2024-01-17 RX ORDER — AMITRIPTYLINE HYDROCHLORIDE 25 MG/1
25 TABLET, FILM COATED ORAL NIGHTLY
Status: DISCONTINUED | OUTPATIENT
Start: 2024-01-17 | End: 2024-01-21 | Stop reason: HOSPADM

## 2024-01-17 RX ORDER — PANTOPRAZOLE SODIUM 40 MG/1
40 TABLET, DELAYED RELEASE ORAL DAILY
Status: DISCONTINUED | OUTPATIENT
Start: 2024-01-17 | End: 2024-01-21 | Stop reason: HOSPADM

## 2024-01-17 RX ORDER — POLYETHYLENE GLYCOL 3350 17 G/17G
17 POWDER, FOR SOLUTION ORAL DAILY
Status: DISCONTINUED | OUTPATIENT
Start: 2024-01-17 | End: 2024-01-21 | Stop reason: HOSPADM

## 2024-01-17 RX ORDER — HYDROMORPHONE HYDROCHLORIDE 1 MG/ML
0.2 INJECTION, SOLUTION INTRAMUSCULAR; INTRAVENOUS; SUBCUTANEOUS
Status: DISCONTINUED | OUTPATIENT
Start: 2024-01-17 | End: 2024-01-18

## 2024-01-17 RX ORDER — LOSARTAN POTASSIUM 50 MG/1
100 TABLET ORAL DAILY
Status: DISCONTINUED | OUTPATIENT
Start: 2024-01-17 | End: 2024-01-21 | Stop reason: HOSPADM

## 2024-01-17 RX ORDER — OXYBUTYNIN CHLORIDE 5 MG/1
5 TABLET ORAL 3 TIMES DAILY
Status: DISCONTINUED | OUTPATIENT
Start: 2024-01-17 | End: 2024-01-21 | Stop reason: HOSPADM

## 2024-01-17 RX ORDER — PROTAMINE SULFATE 10 MG/ML
INJECTION, SOLUTION INTRAVENOUS AS NEEDED
Status: DISCONTINUED | OUTPATIENT
Start: 2024-01-17 | End: 2024-01-17

## 2024-01-17 RX ORDER — MECLIZINE HYDROCHLORIDE 25 MG/1
25 TABLET ORAL NIGHTLY
Status: DISCONTINUED | OUTPATIENT
Start: 2024-01-17 | End: 2024-01-21 | Stop reason: HOSPADM

## 2024-01-17 RX ORDER — METOLAZONE 5 MG/1
5 TABLET ORAL DAILY
Status: DISCONTINUED | OUTPATIENT
Start: 2024-01-17 | End: 2024-01-17

## 2024-01-17 RX ORDER — OXYCODONE HYDROCHLORIDE 5 MG/1
5 TABLET ORAL EVERY 6 HOURS PRN
Status: DISCONTINUED | OUTPATIENT
Start: 2024-01-17 | End: 2024-01-21 | Stop reason: HOSPADM

## 2024-01-17 RX ORDER — LIDOCAINE HYDROCHLORIDE 10 MG/ML
INJECTION INFILTRATION; PERINEURAL AS NEEDED
Status: DISCONTINUED | OUTPATIENT
Start: 2024-01-17 | End: 2024-01-17

## 2024-01-17 RX ORDER — IODIXANOL 320 MG/ML
INJECTION, SOLUTION INTRAVASCULAR AS NEEDED
Status: DISCONTINUED | OUTPATIENT
Start: 2024-01-17 | End: 2024-01-21 | Stop reason: HOSPADM

## 2024-01-17 RX ORDER — HEPARIN SODIUM 1000 [USP'U]/ML
INJECTION, SOLUTION INTRAVENOUS; SUBCUTANEOUS AS NEEDED
Status: DISCONTINUED | OUTPATIENT
Start: 2024-01-17 | End: 2024-01-17

## 2024-01-17 RX ORDER — NALOXONE HYDROCHLORIDE 0.4 MG/ML
0.2 INJECTION, SOLUTION INTRAMUSCULAR; INTRAVENOUS; SUBCUTANEOUS EVERY 5 MIN PRN
Status: DISCONTINUED | OUTPATIENT
Start: 2024-01-17 | End: 2024-01-21 | Stop reason: HOSPADM

## 2024-01-17 RX ORDER — METOPROLOL TARTRATE 1 MG/ML
INJECTION, SOLUTION INTRAVENOUS AS NEEDED
Status: DISCONTINUED | OUTPATIENT
Start: 2024-01-17 | End: 2024-01-17

## 2024-01-17 RX ORDER — ROCURONIUM BROMIDE 10 MG/ML
INJECTION, SOLUTION INTRAVENOUS AS NEEDED
Status: DISCONTINUED | OUTPATIENT
Start: 2024-01-17 | End: 2024-01-17

## 2024-01-17 RX ORDER — SODIUM CHLORIDE, SODIUM LACTATE, POTASSIUM CHLORIDE, CALCIUM CHLORIDE 600; 310; 30; 20 MG/100ML; MG/100ML; MG/100ML; MG/100ML
INJECTION, SOLUTION INTRAVENOUS CONTINUOUS PRN
Status: DISCONTINUED | OUTPATIENT
Start: 2024-01-17 | End: 2024-01-17

## 2024-01-17 RX ORDER — FERROUS GLUCONATE 325 MG
324 TABLET ORAL DAILY
Status: DISCONTINUED | OUTPATIENT
Start: 2024-01-17 | End: 2024-01-21 | Stop reason: HOSPADM

## 2024-01-17 RX ORDER — LIDOCAINE HYDROCHLORIDE 10 MG/ML
0.1 INJECTION, SOLUTION EPIDURAL; INFILTRATION; INTRACAUDAL; PERINEURAL ONCE
Status: DISCONTINUED | OUTPATIENT
Start: 2024-01-17 | End: 2024-01-18

## 2024-01-17 RX ORDER — HYDROMORPHONE HYDROCHLORIDE 1 MG/ML
0.5 INJECTION, SOLUTION INTRAMUSCULAR; INTRAVENOUS; SUBCUTANEOUS EVERY 5 MIN PRN
Status: DISCONTINUED | OUTPATIENT
Start: 2024-01-17 | End: 2024-01-17

## 2024-01-17 RX ORDER — CEFAZOLIN 1 G/1
INJECTION, POWDER, FOR SOLUTION INTRAVENOUS AS NEEDED
Status: DISCONTINUED | OUTPATIENT
Start: 2024-01-17 | End: 2024-01-17

## 2024-01-17 RX ORDER — CARVEDILOL 12.5 MG/1
12.5 TABLET ORAL
Status: DISCONTINUED | OUTPATIENT
Start: 2024-01-17 | End: 2024-01-19

## 2024-01-17 RX ORDER — SODIUM CHLORIDE 0.9 G/100ML
IRRIGANT IRRIGATION AS NEEDED
Status: DISCONTINUED | OUTPATIENT
Start: 2024-01-17 | End: 2024-01-21 | Stop reason: HOSPADM

## 2024-01-17 RX ORDER — OXYBUTYNIN CHLORIDE 15 MG/1
15 TABLET, EXTENDED RELEASE ORAL DAILY
Status: DISCONTINUED | OUTPATIENT
Start: 2024-01-17 | End: 2024-01-17 | Stop reason: SDUPTHER

## 2024-01-17 RX ORDER — ESMOLOL HYDROCHLORIDE 10 MG/ML
INJECTION INTRAVENOUS AS NEEDED
Status: DISCONTINUED | OUTPATIENT
Start: 2024-01-17 | End: 2024-01-17

## 2024-01-17 RX ORDER — HYDROMORPHONE HYDROCHLORIDE 1 MG/ML
0.2 INJECTION, SOLUTION INTRAMUSCULAR; INTRAVENOUS; SUBCUTANEOUS EVERY 5 MIN PRN
Status: DISCONTINUED | OUTPATIENT
Start: 2024-01-17 | End: 2024-01-17

## 2024-01-17 RX ORDER — ONDANSETRON HYDROCHLORIDE 2 MG/ML
4 INJECTION, SOLUTION INTRAVENOUS ONCE AS NEEDED
Status: DISCONTINUED | OUTPATIENT
Start: 2024-01-17 | End: 2024-01-21 | Stop reason: HOSPADM

## 2024-01-17 RX ORDER — HYDRALAZINE HYDROCHLORIDE 50 MG/1
100 TABLET, FILM COATED ORAL 3 TIMES DAILY
Status: DISCONTINUED | OUTPATIENT
Start: 2024-01-17 | End: 2024-01-21 | Stop reason: HOSPADM

## 2024-01-17 RX ORDER — ASPIRIN 81 MG/1
81 TABLET ORAL DAILY
Status: DISCONTINUED | OUTPATIENT
Start: 2024-01-17 | End: 2024-01-21 | Stop reason: HOSPADM

## 2024-01-17 RX ORDER — ISOSORBIDE MONONITRATE 30 MG/1
120 TABLET, EXTENDED RELEASE ORAL DAILY
Status: DISCONTINUED | OUTPATIENT
Start: 2024-01-17 | End: 2024-01-21 | Stop reason: HOSPADM

## 2024-01-17 RX ORDER — ATORVASTATIN CALCIUM 40 MG/1
40 TABLET, FILM COATED ORAL DAILY
Status: DISCONTINUED | OUTPATIENT
Start: 2024-01-17 | End: 2024-01-21 | Stop reason: HOSPADM

## 2024-01-17 RX ORDER — FUROSEMIDE 20 MG/1
40 TABLET ORAL 2 TIMES DAILY
Status: DISCONTINUED | OUTPATIENT
Start: 2024-01-17 | End: 2024-01-18

## 2024-01-17 RX ORDER — HEPARIN SODIUM 5000 [USP'U]/ML
7500 INJECTION, SOLUTION INTRAVENOUS; SUBCUTANEOUS EVERY 8 HOURS SCHEDULED
Status: DISCONTINUED | OUTPATIENT
Start: 2024-01-17 | End: 2024-01-18

## 2024-01-17 RX ORDER — PROPOFOL 10 MG/ML
INJECTION, EMULSION INTRAVENOUS AS NEEDED
Status: DISCONTINUED | OUTPATIENT
Start: 2024-01-17 | End: 2024-01-17

## 2024-01-17 RX ORDER — ONDANSETRON HYDROCHLORIDE 2 MG/ML
INJECTION, SOLUTION INTRAVENOUS AS NEEDED
Status: DISCONTINUED | OUTPATIENT
Start: 2024-01-17 | End: 2024-01-17

## 2024-01-17 RX ORDER — ACETAMINOPHEN 325 MG/1
650 TABLET ORAL EVERY 4 HOURS
Status: DISCONTINUED | OUTPATIENT
Start: 2024-01-17 | End: 2024-01-21 | Stop reason: HOSPADM

## 2024-01-17 RX ORDER — LEVOTHYROXINE SODIUM 50 UG/1
50 TABLET ORAL DAILY
Status: DISCONTINUED | OUTPATIENT
Start: 2024-01-17 | End: 2024-01-21 | Stop reason: HOSPADM

## 2024-01-17 RX ORDER — FENTANYL CITRATE 50 UG/ML
INJECTION, SOLUTION INTRAMUSCULAR; INTRAVENOUS AS NEEDED
Status: DISCONTINUED | OUTPATIENT
Start: 2024-01-17 | End: 2024-01-17

## 2024-01-17 RX ORDER — FLUTICASONE FUROATE AND VILANTEROL 200; 25 UG/1; UG/1
1 POWDER RESPIRATORY (INHALATION)
Status: DISCONTINUED | OUTPATIENT
Start: 2024-01-17 | End: 2024-01-21 | Stop reason: HOSPADM

## 2024-01-17 RX ORDER — METOPROLOL TARTRATE 1 MG/ML
5 INJECTION, SOLUTION INTRAVENOUS ONCE
Status: COMPLETED | OUTPATIENT
Start: 2024-01-17 | End: 2024-01-17

## 2024-01-17 RX ORDER — LEVOTHYROXINE SODIUM 100 UG/1
200 TABLET ORAL DAILY
Status: DISCONTINUED | OUTPATIENT
Start: 2024-01-17 | End: 2024-01-21 | Stop reason: HOSPADM

## 2024-01-17 RX ORDER — DEXAMETHASONE SODIUM PHOSPHATE 100 MG/10ML
INJECTION INTRAMUSCULAR; INTRAVENOUS AS NEEDED
Status: DISCONTINUED | OUTPATIENT
Start: 2024-01-17 | End: 2024-01-17

## 2024-01-17 RX ORDER — PHENYLEPHRINE HYDROCHLORIDE 10 MG/ML
INJECTION INTRAVENOUS AS NEEDED
Status: DISCONTINUED | OUTPATIENT
Start: 2024-01-17 | End: 2024-01-17

## 2024-01-17 RX ORDER — SODIUM CHLORIDE, SODIUM LACTATE, POTASSIUM CHLORIDE, CALCIUM CHLORIDE 600; 310; 30; 20 MG/100ML; MG/100ML; MG/100ML; MG/100ML
100 INJECTION, SOLUTION INTRAVENOUS CONTINUOUS
Status: DISCONTINUED | OUTPATIENT
Start: 2024-01-17 | End: 2024-01-17

## 2024-01-17 RX ORDER — METOPROLOL TARTRATE 1 MG/ML
5 INJECTION, SOLUTION INTRAVENOUS ONCE
Status: COMPLETED | OUTPATIENT
Start: 2024-01-18 | End: 2024-01-17

## 2024-01-17 RX ORDER — CHOLECALCIFEROL (VITAMIN D3) 25 MCG
5000 TABLET ORAL DAILY
Status: DISCONTINUED | OUTPATIENT
Start: 2024-01-17 | End: 2024-01-21 | Stop reason: HOSPADM

## 2024-01-17 RX ADMIN — SODIUM CHLORIDE, POTASSIUM CHLORIDE, SODIUM LACTATE AND CALCIUM CHLORIDE: 600; 310; 30; 20 INJECTION, SOLUTION INTRAVENOUS at 08:28

## 2024-01-17 RX ADMIN — ESMOLOL HYDROCHLORIDE 30 MG: 10 INJECTION, SOLUTION INTRAVENOUS at 12:39

## 2024-01-17 RX ADMIN — HEPARIN SODIUM 7500 UNITS: 5000 INJECTION INTRAVENOUS; SUBCUTANEOUS at 17:56

## 2024-01-17 RX ADMIN — HYDRALAZINE HYDROCHLORIDE 100 MG: 50 TABLET, FILM COATED ORAL at 17:48

## 2024-01-17 RX ADMIN — SUGAMMADEX 600 MG: 100 INJECTION, SOLUTION INTRAVENOUS at 12:39

## 2024-01-17 RX ADMIN — AMITRIPTYLINE HYDROCHLORIDE 25 MG: 25 TABLET, FILM COATED ORAL at 21:17

## 2024-01-17 RX ADMIN — METOPROLOL TARTRATE 5 MG: 1 INJECTION, SOLUTION INTRAVENOUS at 23:54

## 2024-01-17 RX ADMIN — ROCURONIUM BROMIDE 70 MG: 10 INJECTION INTRAVENOUS at 08:36

## 2024-01-17 RX ADMIN — ATORVASTATIN CALCIUM 40 MG: 40 TABLET, FILM COATED ORAL at 21:16

## 2024-01-17 RX ADMIN — OXYCODONE HYDROCHLORIDE 5 MG: 5 TABLET ORAL at 17:47

## 2024-01-17 RX ADMIN — PROPOFOL 150 MG: 10 INJECTION, EMULSION INTRAVENOUS at 08:36

## 2024-01-17 RX ADMIN — MECLIZINE HYDROCHLORIDE 25 MG: 25 TABLET ORAL at 21:16

## 2024-01-17 RX ADMIN — PHENYLEPHRINE HYDROCHLORIDE 120 MCG: 10 INJECTION INTRAVENOUS at 09:05

## 2024-01-17 RX ADMIN — PHENYLEPHRINE HYDROCHLORIDE 160 MCG: 10 INJECTION INTRAVENOUS at 09:12

## 2024-01-17 RX ADMIN — HEPARIN SODIUM 3000 UNITS: 1000 INJECTION INTRAVENOUS; SUBCUTANEOUS at 10:30

## 2024-01-17 RX ADMIN — OXYBUTYNIN CHLORIDE 5 MG: 5 TABLET ORAL at 21:18

## 2024-01-17 RX ADMIN — ROCURONIUM BROMIDE 10 MG: 10 INJECTION INTRAVENOUS at 10:47

## 2024-01-17 RX ADMIN — PROTAMINE SULFATE 60 MG: 10 INJECTION, SOLUTION INTRAVENOUS at 11:56

## 2024-01-17 RX ADMIN — CARVEDILOL 12.5 MG: 12.5 TABLET, FILM COATED ORAL at 17:48

## 2024-01-17 RX ADMIN — ACETAMINOPHEN 650 MG: 325 TABLET ORAL at 21:16

## 2024-01-17 RX ADMIN — PANTOPRAZOLE SODIUM 40 MG: 40 TABLET, DELAYED RELEASE ORAL at 17:48

## 2024-01-17 RX ADMIN — FENTANYL CITRATE 50 MCG: 50 INJECTION, SOLUTION INTRAMUSCULAR; INTRAVENOUS at 08:36

## 2024-01-17 RX ADMIN — HYDROMORPHONE HYDROCHLORIDE 0.5 MG: 1 INJECTION, SOLUTION INTRAMUSCULAR; INTRAVENOUS; SUBCUTANEOUS at 14:45

## 2024-01-17 RX ADMIN — METOPROLOL TARTRATE 5 MG: 1 INJECTION, SOLUTION INTRAVENOUS at 23:28

## 2024-01-17 RX ADMIN — ACETAMINOPHEN 650 MG: 325 TABLET ORAL at 17:48

## 2024-01-17 RX ADMIN — LOSARTAN POTASSIUM 100 MG: 50 TABLET, FILM COATED ORAL at 17:48

## 2024-01-17 RX ADMIN — FUROSEMIDE 40 MG: 20 TABLET ORAL at 21:18

## 2024-01-17 RX ADMIN — ONDANSETRON 4 MG: 2 INJECTION INTRAMUSCULAR; INTRAVENOUS at 12:01

## 2024-01-17 RX ADMIN — CEFAZOLIN 3 G: 330 INJECTION, POWDER, FOR SOLUTION INTRAMUSCULAR; INTRAVENOUS at 08:46

## 2024-01-17 RX ADMIN — LIDOCAINE HYDROCHLORIDE 100 MG: 10 INJECTION, SOLUTION INFILTRATION; PERINEURAL at 08:36

## 2024-01-17 RX ADMIN — HYDROMORPHONE HYDROCHLORIDE 0.2 MG: 1 INJECTION, SOLUTION INTRAMUSCULAR; INTRAVENOUS; SUBCUTANEOUS at 21:17

## 2024-01-17 RX ADMIN — FERROUS GLUCONATE 324 MG: 324 TABLET ORAL at 21:17

## 2024-01-17 RX ADMIN — HEPARIN SODIUM 10000 UNITS: 1000 INJECTION INTRAVENOUS; SUBCUTANEOUS at 10:22

## 2024-01-17 RX ADMIN — ROCURONIUM BROMIDE 20 MG: 10 INJECTION INTRAVENOUS at 09:53

## 2024-01-17 RX ADMIN — PHENYLEPHRINE HYDROCHLORIDE 120 MCG: 10 INJECTION INTRAVENOUS at 09:37

## 2024-01-17 RX ADMIN — FENTANYL CITRATE 50 MCG: 50 INJECTION, SOLUTION INTRAMUSCULAR; INTRAVENOUS at 09:53

## 2024-01-17 RX ADMIN — ESMOLOL HYDROCHLORIDE 40 MG: 10 INJECTION, SOLUTION INTRAVENOUS at 12:20

## 2024-01-17 RX ADMIN — DEXAMETHASONE SODIUM PHOSPHATE 4 MG: 10 INJECTION INTRAMUSCULAR; INTRAVENOUS at 09:42

## 2024-01-17 RX ADMIN — METOPROLOL TARTRATE 5 MG: 1 INJECTION, SOLUTION INTRAVENOUS at 09:10

## 2024-01-17 RX ADMIN — ROCURONIUM BROMIDE 10 MG: 10 INJECTION INTRAVENOUS at 11:07

## 2024-01-17 RX ADMIN — DEXMEDETOMIDINE HYDROCHLORIDE 12 MCG: 100 INJECTION, SOLUTION INTRAVENOUS at 12:40

## 2024-01-17 SDOH — SOCIAL STABILITY: SOCIAL INSECURITY: DO YOU FEEL UNSAFE GOING BACK TO THE PLACE WHERE YOU ARE LIVING?: NO

## 2024-01-17 SDOH — SOCIAL STABILITY: SOCIAL INSECURITY: ARE THERE ANY APPARENT SIGNS OF INJURIES/BEHAVIORS THAT COULD BE RELATED TO ABUSE/NEGLECT?: NO

## 2024-01-17 SDOH — SOCIAL STABILITY: SOCIAL INSECURITY: HAVE YOU HAD THOUGHTS OF HARMING ANYONE ELSE?: NO

## 2024-01-17 SDOH — SOCIAL STABILITY: SOCIAL INSECURITY: DO YOU FEEL ANYONE HAS EXPLOITED OR TAKEN ADVANTAGE OF YOU FINANCIALLY OR OF YOUR PERSONAL PROPERTY?: NO

## 2024-01-17 SDOH — SOCIAL STABILITY: SOCIAL INSECURITY: ABUSE: ADULT

## 2024-01-17 SDOH — SOCIAL STABILITY: SOCIAL INSECURITY: DOES ANYONE TRY TO KEEP YOU FROM HAVING/CONTACTING OTHER FRIENDS OR DOING THINGS OUTSIDE YOUR HOME?: NO

## 2024-01-17 SDOH — HEALTH STABILITY: MENTAL HEALTH: CURRENT SMOKER: 0

## 2024-01-17 SDOH — SOCIAL STABILITY: SOCIAL INSECURITY: ARE YOU OR HAVE YOU BEEN THREATENED OR ABUSED PHYSICALLY, EMOTIONALLY, OR SEXUALLY BY ANYONE?: NO

## 2024-01-17 SDOH — SOCIAL STABILITY: SOCIAL INSECURITY: WERE YOU ABLE TO COMPLETE ALL THE BEHAVIORAL HEALTH SCREENINGS?: YES

## 2024-01-17 SDOH — SOCIAL STABILITY: SOCIAL INSECURITY: HAS ANYONE EVER THREATENED TO HURT YOUR FAMILY OR YOUR PETS?: NO

## 2024-01-17 ASSESSMENT — PAIN - FUNCTIONAL ASSESSMENT
PAIN_FUNCTIONAL_ASSESSMENT: 0-10

## 2024-01-17 ASSESSMENT — COGNITIVE AND FUNCTIONAL STATUS - GENERAL
WALKING IN HOSPITAL ROOM: A LITTLE
DRESSING REGULAR LOWER BODY CLOTHING: A LITTLE
DAILY ACTIVITIY SCORE: 19
PERSONAL GROOMING: A LITTLE
MOBILITY SCORE: 22
MOVING FROM LYING ON BACK TO SITTING ON SIDE OF FLAT BED WITH BEDRAILS: A LITTLE
TURNING FROM BACK TO SIDE WHILE IN FLAT BAD: A LITTLE
CLIMB 3 TO 5 STEPS WITH RAILING: A LITTLE
MOBILITY SCORE: 22
WALKING IN HOSPITAL ROOM: A LITTLE
MOBILITY SCORE: 18
DAILY ACTIVITIY SCORE: 24
WALKING IN HOSPITAL ROOM: A LITTLE
STANDING UP FROM CHAIR USING ARMS: A LITTLE
TOILETING: A LITTLE
MOVING TO AND FROM BED TO CHAIR: A LITTLE
DAILY ACTIVITIY SCORE: 24
CLIMB 3 TO 5 STEPS WITH RAILING: A LITTLE
CLIMB 3 TO 5 STEPS WITH RAILING: A LITTLE
PATIENT BASELINE BEDBOUND: NO
DRESSING REGULAR UPPER BODY CLOTHING: A LITTLE
HELP NEEDED FOR BATHING: A LITTLE

## 2024-01-17 ASSESSMENT — ACTIVITIES OF DAILY LIVING (ADL)
HEARING - RIGHT EAR: FUNCTIONAL
DRESSING YOURSELF: INDEPENDENT
ADEQUATE_TO_COMPLETE_ADL: YES
TOILETING: INDEPENDENT
HEARING - LEFT EAR: FUNCTIONAL
LACK_OF_TRANSPORTATION: NO
BATHING: INDEPENDENT
BATHING: INDEPENDENT
JUDGMENT_ADEQUATE_SAFELY_COMPLETE_DAILY_ACTIVITIES: YES
FEEDING YOURSELF: INDEPENDENT
FEEDING YOURSELF: INDEPENDENT
PATIENT'S MEMORY ADEQUATE TO SAFELY COMPLETE DAILY ACTIVITIES?: YES
GROOMING: INDEPENDENT
TOILETING: INDEPENDENT
DRESSING YOURSELF: INDEPENDENT
ASSISTIVE_DEVICE: WALKER;CANE
WALKS IN HOME: INDEPENDENT
HEARING - LEFT EAR: FUNCTIONAL
PATIENT'S MEMORY ADEQUATE TO SAFELY COMPLETE DAILY ACTIVITIES?: YES
WALKS IN HOME: INDEPENDENT
GROOMING: INDEPENDENT
ADEQUATE_TO_COMPLETE_ADL: YES
JUDGMENT_ADEQUATE_SAFELY_COMPLETE_DAILY_ACTIVITIES: YES
HEARING - RIGHT EAR: FUNCTIONAL

## 2024-01-17 ASSESSMENT — PATIENT HEALTH QUESTIONNAIRE - PHQ9
2. FEELING DOWN, DEPRESSED OR HOPELESS: NOT AT ALL
1. LITTLE INTEREST OR PLEASURE IN DOING THINGS: NOT AT ALL
SUM OF ALL RESPONSES TO PHQ9 QUESTIONS 1 & 2: 0

## 2024-01-17 ASSESSMENT — LIFESTYLE VARIABLES
HOW OFTEN DO YOU HAVE A DRINK CONTAINING ALCOHOL: NEVER
HOW OFTEN DO YOU HAVE 6 OR MORE DRINKS ON ONE OCCASION: NEVER
AUDIT-C TOTAL SCORE: 0
SKIP TO QUESTIONS 9-10: 1
AUDIT-C TOTAL SCORE: 0
HOW MANY STANDARD DRINKS CONTAINING ALCOHOL DO YOU HAVE ON A TYPICAL DAY: PATIENT DOES NOT DRINK

## 2024-01-17 ASSESSMENT — COLUMBIA-SUICIDE SEVERITY RATING SCALE - C-SSRS
2. HAVE YOU ACTUALLY HAD ANY THOUGHTS OF KILLING YOURSELF?: NO
6. HAVE YOU EVER DONE ANYTHING, STARTED TO DO ANYTHING, OR PREPARED TO DO ANYTHING TO END YOUR LIFE?: NO
1. IN THE PAST MONTH, HAVE YOU WISHED YOU WERE DEAD OR WISHED YOU COULD GO TO SLEEP AND NOT WAKE UP?: NO

## 2024-01-17 ASSESSMENT — PAIN SCALES - GENERAL
PAINLEVEL_OUTOF10: 7
PAINLEVEL_OUTOF10: 3
PAINLEVEL_OUTOF10: 2
PAINLEVEL_OUTOF10: 3
PAINLEVEL_OUTOF10: 2
PAINLEVEL_OUTOF10: 4
PAINLEVEL_OUTOF10: 5 - MODERATE PAIN
PAINLEVEL_OUTOF10: 6
PAINLEVEL_OUTOF10: 6
PAINLEVEL_OUTOF10: 2
PAINLEVEL_OUTOF10: 5 - MODERATE PAIN

## 2024-01-17 NOTE — ANESTHESIA PROCEDURE NOTES
Airway  Date/Time: 1/17/2024 8:45 AM  Urgency: elective    Airway not difficult    Staffing  Performed: resident   Authorized by: Tia Haas MD    Performed by: Leonardo Vega MD  Patient location during procedure: OR    Indications and Patient Condition  Indications for airway management: anesthesia  Spontaneous Ventilation: absent  Sedation level: deep  Preoxygenated: yes  Patient position: sniffing  MILS not maintained throughout  Mask difficulty assessment: 1 - vent by mask  Planned trial extubation    Final Airway Details  Final airway type: endotracheal airway      Successful airway: ETT  Cuffed: yes   Successful intubation technique: video laryngoscopy  Facilitating devices/methods: intubating stylet  Endotracheal tube insertion site: oral  Blade size: #3  ETT size (mm): 7.0  Cormack-Lehane Classification: grade I - full view of glottis  Placement verified by: chest auscultation and capnometry   Inital cuff pressure (cm H2O): 10  Measured from: lips  ETT to lips (cm): 22  Number of attempts at approach: 1

## 2024-01-17 NOTE — SIGNIFICANT EVENT
Bed ready and patient ready to transfer. Vascular surgeon at bedside to assess and update patient. Dr Rao stated ok to remove Raven.

## 2024-01-17 NOTE — H&P
Vascular Surgery History and Physical Update  See clinic note 12/6/23. No interval changes to medical history since last contact.    Remains at baseline ambulatory status.    Discussed plans for today for endovascular revision of current indwelling stent graft with R renal parallel grafting and extension of iliac limbs distally; possible coil embolization R hypogastric.    Patient is in agreement with plan as discussed.    Octavio Obrien MD

## 2024-01-17 NOTE — ANESTHESIA POSTPROCEDURE EVALUATION
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)      (Abdominal aortic aneurysm (AAA) without rupture, unspecified part (CMS/HCC) [I71.40])      (Type I endoleak of aortic graft (CMS/HCC) [T82.310A])    Providers: Fransico Hernandez MD Responsible Provider: Tia Haas MD    Anesthesia Type: general ASA Status: 4            Anesthesia Type: general    Vitals Value Taken Time   /57 01/17/24 1301   Temp 35.6 01/17/24 1301   Pulse 87 01/17/24 1301   Resp 13 01/17/24 1301   SpO2 95.6 01/17/24 1301   Patient SV on 8 L/min of O2 with SFM    Anesthesia Post Evaluation    Patient location during evaluation: PACU  Patient participation: complete - patient participated  Level of consciousness: awake  Pain management: adequate  Airway patency: patent  Cardiovascular status: acceptable  Respiratory status: acceptable  Hydration status: acceptable  Postoperative Nausea and Vomiting: none        No notable events documented.

## 2024-01-17 NOTE — BRIEF OP NOTE
Date: 2024  OR Location: [address] Cardiac Cath Lab    Name: Emma Villela, : 1955, Age: 68 y.o., MRN: [id number], Sex: female    Diagnosis  Pre-op Diagnosis     * Abdominal aortic aneurysm (AAA) without rupture, unspecified part (CMS/HCC) [I71.40]     * Type I endoleak of aortic graft (CMS/HCC) [T82.310A] Post-op Diagnosis     * Abdominal aortic aneurysm (AAA) without rupture, unspecified part (CMS/HCC) [I71.40]     * Type I endoleak of aortic graft (CMS/HCC) [T82.310A]     Procedures  Ultrasound-guided percutaneous access bilateral common femoral arteries with percutaneous closure  Right renal artery stenting (6x59m Marcus VBX)  Aortic stent graft placement ([id number] Marcus aortic cuff x2)  Bilateral iliac stent graft placement (right [id number] and [id number], left [id number])  Right hypogastric artery coil embolization    Surgeons      * Fransico Hernandez - Primary    Resident/Fellow/Other Assistant:  Surgeon(s) and Role:     * Sj Walton MD - Assisting    Procedure Summary  Anesthesia: General  ASA: IV  Anesthesia Staff: Anesthesiologist: Tia Haas MD  C-AA: AUSTIN Allen  Anesthesia Resident: Leonardo Vega MD  Estimated Blood Loss: 75mL  Intra-op Medications: * Intraprocedure medication information is unavailable because the case start and end events have not been set *           Anesthesia Record               Intraprocedure I/O Totals          Output    Urine 900 mL    Total Output 900 mL          Specimen: No specimens collected     Staff:   Circulator: Antonette Candelaria RN; Elier Billy RN          Findings: Type Ia endoleak at case start, treated with proximal aortic cuff placement with R renal chimney stent (6x59mm Marcus VBX). Distal iliac limbs extended, R into EIA with R hypogastric coiling, L distal extension further into L GRANT.   At case completion residual Type Ia endoleak present with parent R renal artery stent.    L perclose failure of one device requiring  manual pressure hemostasis    Complications:  None; patient tolerated the procedure well.     Disposition: PACU - hemodynamically stable.  Condition: stable  Specimens Collected: No specimens collected  Attending Attestation: I was present and scrubbed for the entire procedure.    Fransico Hernandez  Phone Number: [phone]

## 2024-01-17 NOTE — PROGRESS NOTES
Pharmacy Medication History Review    Emma Villela is a 68 y.o. female admitted for Type I endoleak of aortic graft (CMS/[facility]). Pharmacy reviewed the patient's [id number] medications and allergies for accuracy.    The list below reflects the updated PTA list. Comments regarding how patient may be taking medications differently can be found in the Admit Orders Activity  Prior to Admission Medications   Prescriptions Last Dose Informant Patient Reported?   acetaminophen (Tylenol) 325 mg tablet 1/17/2024 Self Yes   Sig: Take 1 tablet (325 mg) by mouth every 4 hours if needed.   albuterol 90 mcg/actuation inhaler Past Month Self Yes   Sig: Inhale 2 puffs every 6 hours if needed.   amitriptyline (Elavil) 25 mg tablet 1/16/2024 Self No   Sig: TAKE ONE TABLET BY MOUTH AT BEDTIME   aspirin 81 mg EC tablet 1/16/2024 Self Yes   Sig: Take 1 tablet (81 mg) by mouth once daily.   atorvastatin (Lipitor) 40 mg tablet 1/17/2024 Self No   Sig: TAKE ONE TABLET BY MOUTH DAILY   budesonide-formoteroL (Symbicort) 160-4.5 mcg/actuation inhaler 1/17/2024 Self Yes   Sig: Inhale 2 puffs 2 times a day.   carvedilol (Coreg) 25 mg tablet 1/17/2024 Self No   Sig: TAKE ONE TABLET BY MOUTH TWICE A DAY WITH FOOD   chlorhexidine (Peridex) 0.12 % solution 1/17/2024 Self No   Sig: Use 15 mL in the mouth or throat see administration instructions for 2 doses. Use the night before and morning of surgery.   cholecalciferol (Vitamin D-3) 125 MCG (5000 UT) capsule 1/16/2024 Self No   Sig: Take 1 capsule (125 mcg) by mouth once daily.   enoxaparin (Lovenox) 150 mg/mL injection 1/16/2024 Self No   Sig: Inject 1 mL (150 mg) under the skin 2 times a day for 7 doses. For preoperative bridge. Please dispense 7 syringes Do not start before January 13, 2024.   ferrous gluconate 324 (38 Fe) MG tablet 1/16/2024 Self No   Sig: TAKE ONE TABLET BY MOUTH DAILY   furosemide (Lasix) 40 mg tablet 1/17/2024 Self No   Sig: TAKE ONE TABLET BY MOUTH TWO TIMES A DAY    hydrALAZINE (Apresoline) 100 mg tablet 1/17/2024 Self No   Sig: TAKE ONE TABLET BY MOUTH THREE TIMES A DAY   isosorbide mononitrate ER (Imdur) 120 mg 24 hr tablet 1/17/2024 Self No   Sig: TAKE ONE TABLET BY MOUTH once DAILY   levothyroxine (Synthroid, Levoxyl) 200 mcg tablet 1/17/2024 Self No   Sig: TAKE ONE TABLET BY MOUTH EVERY DAY ALONG WITH A 25 MCG TABLET FOR A TOTAL DAILY DOSE  MCG   levothyroxine (Synthroid, Levoxyl) 50 mcg tablet 1/17/2024 Self No   Sig: TAKE 1 TABLET BY MOUTH EVERY DAY ALONG WITH  MCG TABLET TO EQUAL A TOTAL DAILY DOSE  MCG.   losartan (Cozaar) 100 mg tablet 1/16/2024 Self No   Sig: TAKE ONE TABLET BY MOUTH EVERY DAY   magnesium oxide (Mag-Ox) 400 mg (241.3 mg magnesium) tablet 1/16/2024 Self Yes   Sig: Take 1 tablet (400 mg) by mouth 2 times a day with meals.   meclizine (Antivert) 25 mg tablet Past Month Self No   Sig: TAKE ONE TABLET BY MOUTH AT BEDTIME   metOLazone (Zaroxolyn) 5 mg tablet 1/17/2024 Self Yes   Sig: Take 1 tablet (5 mg) by mouth. Twice a week   oxybutynin XL (Ditropan-XL) 15 mg 24 hr tablet 1/16/2024 Self Yes   Sig: Take 1 tablet (15 mg) by mouth once daily.   pantoprazole (ProtoNix) 40 mg EC tablet 1/17/2024 Self No   Sig: TAKE ONE TABLET BY MOUTH EVERY DAY   warfarin (Jantoven) 7.5 mg tablet 1/11/2024 Self No   Sig: TAKE ONE TABLET BY MOUTH EVERY DAY      Facility-Administered Medications: None        The list below reflects the updated allergy list. Please review each documented allergy for additional clarification and justification.  Allergies  Reviewed by Elier Billy RN on 1/17/2024   No Known Allergies         Patient declines M2B at discharge.    Sources used to complete the med history include out patient fill history, OARRS, and patient interview along with 12/28/23 pre admission testing note.       Below are additional concerns with the patient's PTA list.      Len Cordova [facility]  Transitions of Care Clinical Pharmacist  Please reach out  via Epic Chat for questions, if no response call  [phone] or [employer] [facility]

## 2024-01-17 NOTE — SIGNIFICANT EVENT
Post operative check     S: patient lying in PACU bed, states pain 6/10. Denies chest pain, n/v.     O: VSS, on 4LNC  Right neck dressing with minimal strikethrough   Dopplerable bilateral DP and PT    A/P: s/p renal artery stenting, aortic stent graft placement, bilateral iliac stent graft placement, hypogastric artery coil embo  -recovering appropriately  -to come to step down  -oral meds  -mullen out MN  -clear liquid diet  -morning labs    Please page vascular surgery [phone] with any questions

## 2024-01-17 NOTE — Clinical Note
Closure device placed in the left femoral artery. Site closed by Perclose. Perclose failed, manual pressure applied

## 2024-01-17 NOTE — ANESTHESIA PROCEDURE NOTES
Peripheral IV  Date/Time: 1/17/2024 8:55 AM      Placement  Needle size: 16 G  Laterality: right  Location: hand  Site prep: chlorhexidine  Technique: anatomical landmarks  Attempts: 1

## 2024-01-17 NOTE — ANESTHESIA PREPROCEDURE EVALUATION
Patient: Emma Villela    Procedure Information       Date/Time: 01/17/24 0815    Procedure: Repair Endovascular Abdominal Aorta    Location: [facility] HYBRID/CAHVC / Virtual [facility] 2F Cardiac Cath Lab    Providers: Fransico Hernandez MD            Relevant Problems   Anesthesia (within normal limits)      Cardiovascular  Last echo in 2019 showed EF of 55%. Cleared by cards x2 weeks for the procedure    (+) A-fib (CMS/HCC)   (+) Abdominal aortic aneurysm, without rupture, unspecified (CMS/HCC)   (+) Benign essential hypertension   (+) CHF (NYHA class III, ACC/AHA stage C) (CMS/HCC)   (+) Congestive heart failure (CMS/HCC)   (+) Essential (primary) hypertension   (+) Hypercholesterolemia      Endocrine   (+) Class 2 obesity due to excess calories in adult   (+) Colloid thyroid nodule   (+) Goiter   (+) Hypothyroidism, postsurgical   (+) Morbid obesity (CMS/HCC)      GI   (+) GERD (gastroesophageal reflux disease)      /Renal (within normal limits)      Neuro/Psych   (+) Peripheral neuropathy      Pulmonary  On home O2 2L, at night 5L   (+) COPD with exacerbation (CMS/HCC)   (+) Chronic obstructive pulmonary disease (CMS/HCC)   (+) Obstructive sleep apnea   (+) Pneumonia      GI/Hepatic (within normal limits)      Hematology  On coumadin last taken on 1/11, now lovenox last taken 1/16 morning    (+) Other specified anemias      Infectious Disease   (+) Herpes simplex type 1 infection       Clinical information reviewed:    Allergies  Meds               NPO Detail:  NPO/Void Status  Date of Last Liquid: 01/17/24  Time of Last Liquid: 0300  Date of Last Solid: 01/16/24  Time of Last Solid: 1730  Last Intake Type: Clear fluids  Time of Last Void: 0755         Physical Exam    Airway  Mallampati: III  TM distance: >3 FB  Neck ROM: full     Cardiovascular   Rhythm: irregular  Rate: normal     Dental    Pulmonary - normal exam  Comments: On 2L NC   Abdominal   (+) obese           Anesthesia Plan    History of general  anesthesia?: yes  History of complications of general anesthesia?: no    ASA 4     general     The patient is not a current smoker.  Patient was not previously instructed to abstain from smoking on day of procedure.  Patient did not smoke on day of procedure.    intravenous induction   Postoperative administration of opioids is intended.  Anesthetic plan and risks discussed with patient.  Use of blood products discussed with patient who consented to blood products.    Plan discussed with attending.

## 2024-01-17 NOTE — ANESTHESIA PROCEDURE NOTES
Arterial Line:    Date/Time: 1/17/2024 9:00 AM    Staffing  Performed: resident   Authorized by: Tia Haas MD    Performed by: Leonardo Vega MD    An arterial line was placed. Procedure performed using surface landmarks.in the OR for the following indication(s): continuous blood pressure monitoring and blood sampling needed.    A 20 gauge (size), 1 and 3/4 inch (length), Angiocath (type) catheter was placed into the Right radial artery, secured by Tegaderm,   Seldinger technique used.  Events:  patient tolerated procedure well with no complications.      Additional notes:  US guidance with micropuncture kit

## 2024-01-17 NOTE — ANESTHESIA PROCEDURE NOTES
Central Venous Line:    Date/Time: 1/17/2024 10:05 AM    A central venous line was placed in the OR for the following indication(s): central venous access and CVP monitoring.  Staffing  Performed: resident   Authorized by: Tia Haas MD    Performed by: Leonardo Vega MD    Sterility preparation included the following: provider hand hygiene performed prior to central venous catheter insertion, all 5 sterile barriers used (gloves, gown, cap, mask, large sterile drape) during central venous catheter insertion, antiseptic used during central venous catheter insertion and skin prep agent completely dried prior to procedure.  The patient was placed in Trendelenburg position.    Right internal jugular vein was prepped.    The site was prepped with Chlorhexidine.  Size: 9 Fr   Length: 11.5 (16 cm)  Catheter type: introducer   Number of Lumens: double lumen    This catheter was not an oximetric catheter.    During the procedure, the following specific steps were taken: target vein identified, needle advanced into vein and blood aspirated and guidewire advanced into vein.  Seldinger technique used.  Procedure performed using ultrasound guidance.  Sterile gel and probe cover used in ultrasound-guided central venous catheter insertion.    Intravenous verification was obtained by ultrasound, venous blood return and manometry.      Post insertion care included: all ports aspirated, all ports flushed easily, guidewire removed intact, Biopatch applied, line sutured in place and dressing applied.    During the procedure the patient experienced: patient tolerated procedure well with no complications.

## 2024-01-17 NOTE — OP NOTE
Date of Service: 1/17/2024    Preoperative diagnosis: Type Ia endoleak, large abdominal aortic aneurysm.     Postoperative diagnosis: Same    Procedures:  1.  Right renal artery parallel stenting with VBX 5 mm x 59 mm.     Surgeon:  Fransico Hernandez MD, PhD.     Co-Surgeon:  Sj Walton MD (please see his operative note for EVAR portion: proximal cuff extension).     Assistant:  Octavio Obrien MD.       Indications for procedure:   This is a 68 year old female with type Ia endoleak. Patient is here today for type Ia endoleak and Ib endoleak repair with Dr. Walton. Due to the need of second operative surgeon for parallel grafting, I was asked to help with right renal artery stenting while Dr. Walton proceeds with EVAR portion simultaneously.     Description of procedure:  The patient was placed in a supine position under general anesthesia with bilateral femoral access already established by Dr. Walton.     Using the steerable sheath, right renal artery was cannulated and catheterized. The proper placement was confirmed with catheter directed angiogram.   VBX 5 mm x 59 mm stent was obtained and distal half of the stent was positioned into the right renal artery. The proximal portion of the stent was the directed cephalad. Aortogram was then performed and more proximal left renal artery take off was identified.   Then Dr. Walton proceed with proximal cuff extension simutaneously which was placed proximal to the right renal artery but distal to the left renal artery (Please refer to Dr. Walton's operative note).   Parallel grafting (right renal artery stenting) and balloon angioplasty of the proximal aortic cuff was performed at the same time. Completion angiogram revealed widely patent right renal artery stent, left renal artery and resolution of the type Ia endoleak.   For the remaining portion of the surgery, please refer to Dr. Walton's operative note.   ortions.     Fransico Hernandez MD, PhD.   Clinical    [facility] [employer]  Co-Director, Aortic Center  [facility] [place]

## 2024-01-17 NOTE — ANESTHESIA PROCEDURE NOTES
Central Venous Line:    Date/Time: 1/17/2024 9:20 AM    A central venous line was placed in the OR for the following indication(s): central venous access and CVP monitoring.  Staffing  Performed: resident   Authorized by: Tia Haas MD    Performed by: Leonardo Vega MD    Sterility preparation included the following: provider hand hygiene performed prior to central venous catheter insertion, all 5 sterile barriers used (gloves, gown, cap, mask, large sterile drape) during central venous catheter insertion, antiseptic used during central venous catheter insertion and skin prep agent completely dried prior to procedure.  The patient was placed in Trendelenburg position.    Right internal jugular vein was prepped.    The site was prepped with Chlorhexidine.  Size: 9 Fr   Length: 11.5 (16 cm)  Catheter type: introducer   Number of Lumens: double lumen    This catheter was not an oximetric catheter.    During the procedure, the following specific steps were taken: target vein identified, needle advanced into vein and blood aspirated and guidewire advanced into vein.  Seldinger technique used.  Procedure performed using ultrasound guidance.  Sterile gel and probe cover used in ultrasound-guided central venous catheter insertion.    Intravenous verification was obtained by ultrasound, venous blood return and manometry.      Post insertion care included: all ports aspirated, all ports flushed easily, guidewire removed intact, Biopatch applied, line sutured in place and dressing applied.    During the procedure the patient experienced: patient tolerated procedure well with no complications.           images stored in chart

## 2024-01-18 ENCOUNTER — APPOINTMENT (OUTPATIENT)
Dept: RADIOLOGY | Facility: HOSPITAL | Age: 69
DRG: 253 | End: 2024-01-18
Payer: MEDICARE

## 2024-01-18 ENCOUNTER — APPOINTMENT (OUTPATIENT)
Dept: CARDIOLOGY | Facility: HOSPITAL | Age: 69
DRG: 253 | End: 2024-01-18
Payer: MEDICARE

## 2024-01-18 LAB
ANION GAP SERPL CALC-SCNC: 8 MMOL/L (ref 10–20)
ANION GAP SERPL CALC-SCNC: 9 MMOL/L (ref 10–20)
APTT PPP: 40 SECONDS (ref 27–38)
BUN SERPL-MCNC: 17 MG/DL (ref 6–23)
BUN SERPL-MCNC: 17 MG/DL (ref 6–23)
CALCIUM SERPL-MCNC: 10.1 MG/DL (ref 8.6–10.6)
CALCIUM SERPL-MCNC: 9.7 MG/DL (ref 8.6–10.6)
CHLORIDE SERPL-SCNC: 101 MMOL/L (ref 98–107)
CHLORIDE SERPL-SCNC: 102 MMOL/L (ref 98–107)
CO2 SERPL-SCNC: 34 MMOL/L (ref 21–32)
CO2 SERPL-SCNC: 34 MMOL/L (ref 21–32)
CREAT SERPL-MCNC: 0.86 MG/DL (ref 0.5–1.05)
CREAT SERPL-MCNC: 0.89 MG/DL (ref 0.5–1.05)
EGFRCR SERPLBLD CKD-EPI 2021: 70 ML/MIN/1.73M*2
EGFRCR SERPLBLD CKD-EPI 2021: 73 ML/MIN/1.73M*2
ERYTHROCYTE [DISTWIDTH] IN BLOOD BY AUTOMATED COUNT: 14.3 % (ref 11.5–14.5)
GLUCOSE SERPL-MCNC: 113 MG/DL (ref 74–99)
GLUCOSE SERPL-MCNC: 95 MG/DL (ref 74–99)
HCT VFR BLD AUTO: 26.8 % (ref 36–46)
HGB BLD-MCNC: 8.1 G/DL (ref 12–16)
INR PPP: 1.4 (ref 0.9–1.1)
MAGNESIUM SERPL-MCNC: 1.44 MG/DL (ref 1.6–2.4)
MCH RBC QN AUTO: 29.1 PG (ref 26–34)
MCHC RBC AUTO-ENTMCNC: 30.2 G/DL (ref 32–36)
MCV RBC AUTO: 96 FL (ref 80–100)
NRBC BLD-RTO: 0 /100 WBCS (ref 0–0)
PLATELET # BLD AUTO: 136 X10*3/UL (ref 150–450)
POTASSIUM SERPL-SCNC: 3.4 MMOL/L (ref 3.5–5.3)
POTASSIUM SERPL-SCNC: 3.9 MMOL/L (ref 3.5–5.3)
PROTHROMBIN TIME: 15.6 SECONDS (ref 9.8–12.8)
RBC # BLD AUTO: 2.78 X10*6/UL (ref 4–5.2)
SODIUM SERPL-SCNC: 140 MMOL/L (ref 136–145)
SODIUM SERPL-SCNC: 141 MMOL/L (ref 136–145)
WBC # BLD AUTO: 6.3 X10*3/UL (ref 4.4–11.3)

## 2024-01-18 PROCEDURE — 2500000004 HC RX 250 GENERAL PHARMACY W/ HCPCS (ALT 636 FOR OP/ED): Performed by: STUDENT IN AN ORGANIZED HEALTH CARE EDUCATION/TRAINING PROGRAM

## 2024-01-18 PROCEDURE — 2500000004 HC RX 250 GENERAL PHARMACY W/ HCPCS (ALT 636 FOR OP/ED): Performed by: NURSE PRACTITIONER

## 2024-01-18 PROCEDURE — 2500000005 HC RX 250 GENERAL PHARMACY W/O HCPCS: Performed by: STUDENT IN AN ORGANIZED HEALTH CARE EDUCATION/TRAINING PROGRAM

## 2024-01-18 PROCEDURE — 85027 COMPLETE CBC AUTOMATED: CPT | Performed by: STUDENT IN AN ORGANIZED HEALTH CARE EDUCATION/TRAINING PROGRAM

## 2024-01-18 PROCEDURE — 36415 COLL VENOUS BLD VENIPUNCTURE: CPT | Performed by: STUDENT IN AN ORGANIZED HEALTH CARE EDUCATION/TRAINING PROGRAM

## 2024-01-18 PROCEDURE — 1100000001 HC PRIVATE ROOM DAILY

## 2024-01-18 PROCEDURE — 93005 ELECTROCARDIOGRAM TRACING: CPT

## 2024-01-18 PROCEDURE — 71045 X-RAY EXAM CHEST 1 VIEW: CPT

## 2024-01-18 PROCEDURE — 2500000005 HC RX 250 GENERAL PHARMACY W/O HCPCS

## 2024-01-18 PROCEDURE — 2500000001 HC RX 250 WO HCPCS SELF ADMINISTERED DRUGS (ALT 637 FOR MEDICARE OP): Performed by: NURSE PRACTITIONER

## 2024-01-18 PROCEDURE — 83735 ASSAY OF MAGNESIUM: CPT | Performed by: STUDENT IN AN ORGANIZED HEALTH CARE EDUCATION/TRAINING PROGRAM

## 2024-01-18 PROCEDURE — 80048 BASIC METABOLIC PNL TOTAL CA: CPT | Performed by: STUDENT IN AN ORGANIZED HEALTH CARE EDUCATION/TRAINING PROGRAM

## 2024-01-18 PROCEDURE — 85730 THROMBOPLASTIN TIME PARTIAL: CPT | Performed by: STUDENT IN AN ORGANIZED HEALTH CARE EDUCATION/TRAINING PROGRAM

## 2024-01-18 PROCEDURE — 2500000001 HC RX 250 WO HCPCS SELF ADMINISTERED DRUGS (ALT 637 FOR MEDICARE OP): Performed by: STUDENT IN AN ORGANIZED HEALTH CARE EDUCATION/TRAINING PROGRAM

## 2024-01-18 PROCEDURE — 71045 X-RAY EXAM CHEST 1 VIEW: CPT | Performed by: RADIOLOGY

## 2024-01-18 PROCEDURE — 2500000002 HC RX 250 W HCPCS SELF ADMINISTERED DRUGS (ALT 637 FOR MEDICARE OP, ALT 636 FOR OP/ED): Performed by: STUDENT IN AN ORGANIZED HEALTH CARE EDUCATION/TRAINING PROGRAM

## 2024-01-18 PROCEDURE — 99233 SBSQ HOSP IP/OBS HIGH 50: CPT | Performed by: NURSE PRACTITIONER

## 2024-01-18 PROCEDURE — 99222 1ST HOSP IP/OBS MODERATE 55: CPT | Performed by: STUDENT IN AN ORGANIZED HEALTH CARE EDUCATION/TRAINING PROGRAM

## 2024-01-18 PROCEDURE — 36415 COLL VENOUS BLD VENIPUNCTURE: CPT | Performed by: NURSE PRACTITIONER

## 2024-01-18 PROCEDURE — 80048 BASIC METABOLIC PNL TOTAL CA: CPT | Performed by: NURSE PRACTITIONER

## 2024-01-18 RX ORDER — POTASSIUM CHLORIDE 14.9 MG/ML
20 INJECTION INTRAVENOUS ONCE
Status: COMPLETED | OUTPATIENT
Start: 2024-01-18 | End: 2024-01-18

## 2024-01-18 RX ORDER — METOPROLOL TARTRATE 25 MG/1
25 TABLET, FILM COATED ORAL 2 TIMES DAILY
Status: DISCONTINUED | OUTPATIENT
Start: 2024-01-18 | End: 2024-01-19

## 2024-01-18 RX ORDER — MAGNESIUM SULFATE HEPTAHYDRATE 40 MG/ML
2 INJECTION, SOLUTION INTRAVENOUS ONCE
Status: COMPLETED | OUTPATIENT
Start: 2024-01-18 | End: 2024-01-18

## 2024-01-18 RX ORDER — WARFARIN SODIUM 5 MG/1
7.5 TABLET ORAL DAILY
Status: DISCONTINUED | OUTPATIENT
Start: 2024-01-18 | End: 2024-01-21 | Stop reason: HOSPADM

## 2024-01-18 RX ORDER — POTASSIUM CHLORIDE 20 MEQ/1
20 TABLET, EXTENDED RELEASE ORAL ONCE
Status: COMPLETED | OUTPATIENT
Start: 2024-01-18 | End: 2024-01-18

## 2024-01-18 RX ORDER — ENOXAPARIN SODIUM 150 MG/ML
1 INJECTION SUBCUTANEOUS 2 TIMES DAILY
Status: DISCONTINUED | OUTPATIENT
Start: 2024-01-18 | End: 2024-01-21 | Stop reason: HOSPADM

## 2024-01-18 RX ORDER — METOPROLOL TARTRATE 1 MG/ML
5 INJECTION, SOLUTION INTRAVENOUS ONCE
Status: COMPLETED | OUTPATIENT
Start: 2024-01-18 | End: 2024-01-18

## 2024-01-18 RX ADMIN — Medication 5000 UNITS: at 09:09

## 2024-01-18 RX ADMIN — ACETAMINOPHEN 650 MG: 325 TABLET ORAL at 00:41

## 2024-01-18 RX ADMIN — POTASSIUM CHLORIDE 20 MEQ: 14.9 INJECTION, SOLUTION INTRAVENOUS at 09:08

## 2024-01-18 RX ADMIN — MAGNESIUM SULFATE 2 G: 2 INJECTION INTRAVENOUS at 06:40

## 2024-01-18 RX ADMIN — ATORVASTATIN CALCIUM 40 MG: 40 TABLET, FILM COATED ORAL at 20:30

## 2024-01-18 RX ADMIN — ACETAMINOPHEN 650 MG: 325 TABLET ORAL at 05:20

## 2024-01-18 RX ADMIN — PANTOPRAZOLE SODIUM 40 MG: 40 TABLET, DELAYED RELEASE ORAL at 09:09

## 2024-01-18 RX ADMIN — FLUTICASONE FUROATE AND VILANTEROL TRIFENATATE 1 PUFF: 200; 25 POWDER RESPIRATORY (INHALATION) at 07:00

## 2024-01-18 RX ADMIN — POTASSIUM CHLORIDE 20 MEQ: 1500 TABLET, EXTENDED RELEASE ORAL at 05:20

## 2024-01-18 RX ADMIN — SODIUM CHLORIDE, SODIUM LACTATE, POTASSIUM CHLORIDE, AND CALCIUM CHLORIDE 500 ML: 600; 310; 30; 20 INJECTION, SOLUTION INTRAVENOUS at 02:00

## 2024-01-18 RX ADMIN — ENOXAPARIN SODIUM 150 MG: 150 INJECTION SUBCUTANEOUS at 20:30

## 2024-01-18 RX ADMIN — ASPIRIN 81 MG: 81 TABLET, COATED ORAL at 09:09

## 2024-01-18 RX ADMIN — FERROUS GLUCONATE 324 MG: 324 TABLET ORAL at 09:09

## 2024-01-18 RX ADMIN — OXYBUTYNIN CHLORIDE 5 MG: 5 TABLET ORAL at 16:41

## 2024-01-18 RX ADMIN — ACETAMINOPHEN 650 MG: 325 TABLET ORAL at 16:42

## 2024-01-18 RX ADMIN — MECLIZINE HYDROCHLORIDE 25 MG: 25 TABLET ORAL at 20:29

## 2024-01-18 RX ADMIN — SODIUM CHLORIDE, POTASSIUM CHLORIDE, SODIUM LACTATE AND CALCIUM CHLORIDE 500 ML: 600; 310; 30; 20 INJECTION, SOLUTION INTRAVENOUS at 00:01

## 2024-01-18 RX ADMIN — METOPROLOL TARTRATE 5 MG: 1 INJECTION, SOLUTION INTRAVENOUS at 00:41

## 2024-01-18 RX ADMIN — LEVOTHYROXINE SODIUM 200 MCG: 100 TABLET ORAL at 05:20

## 2024-01-18 RX ADMIN — AMITRIPTYLINE HYDROCHLORIDE 25 MG: 25 TABLET, FILM COATED ORAL at 20:30

## 2024-01-18 RX ADMIN — OXYCODONE HYDROCHLORIDE 5 MG: 5 TABLET ORAL at 20:33

## 2024-01-18 RX ADMIN — ISOSORBIDE MONONITRATE 120 MG: 30 TABLET, EXTENDED RELEASE ORAL at 09:09

## 2024-01-18 RX ADMIN — WARFARIN SODIUM 7.5 MG: 5 TABLET ORAL at 16:41

## 2024-01-18 RX ADMIN — ACETAMINOPHEN 650 MG: 325 TABLET ORAL at 09:09

## 2024-01-18 RX ADMIN — HEPARIN SODIUM 7500 UNITS: 5000 INJECTION INTRAVENOUS; SUBCUTANEOUS at 09:09

## 2024-01-18 RX ADMIN — ACETAMINOPHEN 650 MG: 325 TABLET ORAL at 12:04

## 2024-01-18 RX ADMIN — OXYBUTYNIN CHLORIDE 5 MG: 5 TABLET ORAL at 20:29

## 2024-01-18 RX ADMIN — LEVOTHYROXINE SODIUM 50 MCG: 50 TABLET ORAL at 05:20

## 2024-01-18 RX ADMIN — OXYBUTYNIN CHLORIDE 5 MG: 5 TABLET ORAL at 09:09

## 2024-01-18 RX ADMIN — METOPROLOL TARTRATE 25 MG: 25 TABLET, FILM COATED ORAL at 20:30

## 2024-01-18 ASSESSMENT — PAIN - FUNCTIONAL ASSESSMENT
PAIN_FUNCTIONAL_ASSESSMENT: 0-10
PAIN_FUNCTIONAL_ASSESSMENT: 0-10

## 2024-01-18 ASSESSMENT — PAIN SCALES - GENERAL
PAINLEVEL_OUTOF10: 5 - MODERATE PAIN
PAINLEVEL_OUTOF10: 0 - NO PAIN
PAINLEVEL_OUTOF10: 3

## 2024-01-18 ASSESSMENT — COGNITIVE AND FUNCTIONAL STATUS - GENERAL
CLIMB 3 TO 5 STEPS WITH RAILING: A LITTLE
MOBILITY SCORE: 22
WALKING IN HOSPITAL ROOM: A LITTLE
DAILY ACTIVITIY SCORE: 23
TOILETING: A LITTLE

## 2024-01-18 NOTE — SIGNIFICANT EVENT
Rapid response at bedside responding to page for afib RVR. On arrival to bedside pt -125 afib rhythm. Primary team and NACR at bedside. Plan to administer 5mg IV metop for increased HR. Pt pressures rechecked after IV metop with systolics of 80's-90's. HR remained between , with pt denying complaints of dizziness or lightheadedness. #20G PIV placed in pt L hand. Plan to administer 500mL fluid bolus over 1hr and recheck vitals post infusion. Primary team senior at bedside, will defer to primary team for ongoing plan of care.     Primary RN encouraged to reach out with any change in pt condition or with any new concerns.

## 2024-01-18 NOTE — SIGNIFICANT EVENT
Rapid Response called on patient for Afib w/ RVR, hypotension and hypoxia. Emma is s/p AAA with baseline COPD on 2L at home and BECKI that is compliant with her home CPAP. Upon entering the room, the patient was on her home CPAP with a 5L bleed in. Patient taken off of CPAP and put on 6L for new increased oxygen requirements with an SPO2 of 92%. Surgical team and nursing staff gave two doses of metoprolol and start LR drip with improvement in vital signs. Surgical team present at bedside to take over case.     Blanca Acuna, RRT

## 2024-01-18 NOTE — SIGNIFICANT EVENT
Pt blood pressures running low. 84/50, 88/60, 77/47. Manual taken 78/46. Dr. Kory leonardo and at bedside with Dr. Samano. Rapid Response called for afib RVR with hypotension. RR team came and placed a new IV in the left hand. 5mg Metop to be given every 15 minutes for 3 doses per Dr. Samano. 500 bolus of LR started. Bps to cycle every 15 minutes.

## 2024-01-18 NOTE — CONSULTS
Reason For Consult  Afib with RVR    History Of Present Illness  Emma Villela is a 68 y.o. female admitted for Type I endoleak of aortic graft (CMS/[facility]) pmhx of AAA persistent afib, htn, hypothyroidism, hypokalemia, COPD on 2L at home and BECKI that is compliant with her home CPAP     RR was called 01/17 for hypotension in setting of afib with RVR. Per event note: IV metoprolol 5 mg IV was given per primary team with HR response to low 100s, intermittently to 110s. Repeat MAP 62. 500cc LR bolus ordered per primary team to run over 1 hr.     This morning she states that she has been feeling better since last night. She denies chest pain, SOB, lightheadedness, diaphoresis or feelings of palpitations. She endorsed mild headache which started this morning.     Cardiac history  -Last echo: Echo reviewed from May 1, 2019, LVEF 55-60%, slightly enlarged RV, left atrium enlarged   -Last EKG : Atrial fibrillation with rapid ventricular response Abnormal QRS-T angle, consider primary T wave abnormality   - Known history of afib, on carvedilol and warfarin outpatient     Past Medical History  She has a past medical history of Cellulitis, unspecified, COPD (chronic obstructive pulmonary disease) (CMS/[facility]), Essential (primary) hypertension (05/21/2013), Nontoxic goiter, unspecified, Other conditions influencing health status, Personal history of other diseases of the circulatory system (03/03/2015), Personal history of other diseases of the nervous system and sense organs, Personal history of other diseases of the respiratory system (10/21/2014), Personal history of other medical treatment (01/25/2022), Personal history of other specified conditions (06/25/2013), and Sleep apnea.      Current Facility-Administered Medications:     acetaminophen (Tylenol) tablet 650 mg, 650 mg, oral, q4h, Octavio Obrien MD, 650 mg at 01/18/24 0520    amitriptyline (Elavil) tablet 25 mg, 25 mg, oral, Nightly, Octavio Obrien MD, 25 mg at 01/17/24  2117    aspirin EC tablet 81 mg, 81 mg, oral, Daily, Octavio Obrien MD    atorvastatin (Lipitor) tablet 40 mg, 40 mg, oral, Daily, Octavio Obrien MD, 40 mg at 01/17/24 2116    [Held by provider] carvedilol (Coreg) tablet 12.5 mg, 12.5 mg, oral, BID with meals, Octavio Obrien MD, 12.5 mg at 01/17/24 1748    cholecalciferol (Vitamin D-3) tablet 5,000 Units, 5,000 Units, oral, Daily, Octavio Obrien MD    enoxaparin (Lovenox) injection 150 mg, 1 mg/kg, subcutaneous, BID, LUIS Mcclain-CNP    ferrous gluconate (Fergon) 324 (38 Fe) mg tablet 324 mg, 324 mg, oral, Daily, Octavio Obrien MD, 324 mg at 01/17/24 2117    fluticasone furoate-vilanteroL (Breo Ellipta) 200-25 mcg/dose inhaler 1 puff, 1 puff, inhalation, Daily, Octavio Obrien MD, 1 puff at 01/18/24 0700    heparin (porcine) 5,000 Units in sodium chloride 0.9% 500 mL irrigation, , , PRN, Sj Walton MD, 5,000 Units at 01/17/24 1156    heparin (porcine) injection 7,500 Units, 7,500 Units, subcutaneous, q8h KEVIN, Octavio Obrien MD, 7,500 Units at 01/17/24 1756    [Held by provider] hydrALAZINE (Apresoline) tablet 100 mg, 100 mg, oral, TID, Octavio Obrien MD, 100 mg at 01/17/24 1748    iodixanol (VISIPaque) 320 mg iodine/mL injection, , , PRN, Sj Walton MD, 156 mL at 01/17/24 1157    isosorbide mononitrate ER (Imdur) 24 hr tablet 120 mg, 120 mg, oral, Daily, Octavio Obrien MD    levothyroxine (Synthroid, Levoxyl) tablet 200 mcg, 200 mcg, oral, Daily, Octavio Obrien MD, 200 mcg at 01/18/24 0520    levothyroxine (Synthroid, Levoxyl) tablet 50 mcg, 50 mcg, oral, Daily, Octavoi Obrien MD, 50 mcg at 01/18/24 0520    [Held by provider] losartan (Cozaar) tablet 100 mg, 100 mg, oral, Daily, Octavio Obrien MD, 100 mg at 01/17/24 1748    magnesium sulfate IV 2 g, 2 g, intravenous, Once, Storm Samano MD, Stopped at 01/18/24 0840    meclizine (Antivert) tablet 25 mg, 25 mg, oral, Nightly, Octavio Obrien MD, 25 mg at 01/17/24 2116    naloxone  (Narcan) injection 0.2 mg, 0.2 mg, intravenous, q5 min PRN, Octavio Obrien MD    ondansetron (Zofran) injection 4 mg, 4 mg, intravenous, Once PRN, Tia Haas MD    oxybutynin (Ditropan) tablet 5 mg, 5 mg, oral, TID, Octavio Obrien MD, 5 mg at 01/17/24 2118    oxyCODONE (Roxicodone) immediate release tablet 5 mg, 5 mg, oral, q6h PRN, Octavio Obrien MD, 5 mg at 01/17/24 1747    pantoprazole (ProtoNix) EC tablet 40 mg, 40 mg, oral, Daily, Octavio Obrien MD, 40 mg at 01/17/24 1748    polyethylene glycol (Glycolax, Miralax) packet 17 g, 17 g, oral, Daily, Octavio Obrien MD    potassium chloride 20 mEq in 100 mL IV premix, 20 mEq, intravenous, Once, Storm Samano MD    sodium chloride 0.9 % irrigation solution, , , PRN, Sj Walton MD, 1,000 mL at 01/17/24 1157    Current Outpatient Medications   Medication Instructions    acetaminophen (TYLENOL) 325 mg, oral, Every 4 hours PRN    albuterol 90 mcg/actuation inhaler 2 puffs, inhalation, Every 6 hours PRN    amitriptyline (ELAVIL) 25 mg, oral, Nightly    aspirin 81 mg, oral, Daily    atorvastatin (LIPITOR) 40 mg, oral, Daily    budesonide-formoteroL (Symbicort) 160-4.5 mcg/actuation inhaler 2 puffs, inhalation, 2 times daily    carvedilol (COREG) 25 mg, oral, Take with food.    chlorhexidine (Peridex) 0.12 % solution 15 mL, Mouth/Throat, See admin instructions, Use the night before and morning of surgery.    cholecalciferol (VITAMIN D-3) 125 mcg, oral, Daily    ferrous gluconate (FERGON) 324 mg, oral, Daily    furosemide (LASIX) 40 mg, oral, 2 times daily    hydrALAZINE (APRESOLINE) 100 mg, oral, 3 times daily    isosorbide mononitrate ER (IMDUR) 120 mg, oral, Daily    Jantoven 7.5 mg, oral    levothyroxine (Synthroid, Levoxyl) 200 mcg tablet TAKE ONE TABLET BY MOUTH EVERY DAY ALONG WITH A 25 MCG TABLET FOR A TOTAL DAILY DOSE  MCG    levothyroxine (Synthroid, Levoxyl) 50 mcg tablet TAKE 1 TABLET BY MOUTH EVERY DAY ALONG WITH  MCG TABLET TO  EQUAL A TOTAL DAILY DOSE  MCG.    losartan (Cozaar) 100 mg tablet TAKE ONE TABLET BY MOUTH EVERY DAY    magnesium oxide (Mag-Ox) 400 mg (241.3 mg magnesium) tablet 1 tablet, oral, 2 times daily with meals    meclizine (ANTIVERT) 25 mg, oral, Nightly    metOLazone (ZAROXOLYN) 5 mg, oral, Twice a week    oxybutynin XL (DITROPAN-XL) 15 mg, oral, Daily    pantoprazole (ProtoNix) 40 mg EC tablet TAKE ONE TABLET BY MOUTH EVERY DAY      Surgical History  She has a past surgical history that includes Bladder surgery (03/27/2013); Other surgical history (10/24/2018); Other surgical history (03/02/2015); Total thyroidectomy (05/14/2013); Knee arthroscopy w/ debridement (05/14/2013); CT angio abdomen pelvis w and or wo IV IV contrast (5/2/2016); CT angio abdomen pelvis w and or wo IV IV contrast (6/23/2016); CT angio abdomen pelvis w and or wo IV IV contrast (3/2/2018); CT angio abdomen pelvis w and or wo IV IV contrast (2/22/2019); CT angio abdomen pelvis w and or wo IV IV contrast (2/28/2020); CT angio abdomen pelvis w and or wo IV IV contrast (8/28/2020); CT angio abdomen pelvis w and or wo IV IV contrast (6/12/2017); CT angio abdomen pelvis w and or wo IV IV contrast (11/29/2016); IR angiogram aorta abdomen (5/12/2020); CT angio abdomen pelvis w and or wo IV IV contrast (9/22/2022); and CT angio abdomen pelvis w and or wo IV IV contrast (10/23/2023).       Social History  She reports that she quit smoking about 5 years ago. Her smoking use included cigarettes. She has never used smokeless tobacco. She reports that she does not drink alcohol and does not use drugs.    Family History  Family History   Problem Relation Name Age of Onset    Stroke Father      Heart attack Sister      Breast cancer Maternal Grandmother          Allergies  Patient has no known allergies.    Review of Systems  See above. A 12-point ROS was performed and was otherwise negative.     Physical Exam  Blood pressure 116/60, pulse 97, temperature  "36.4 °C (97.5 °F), temperature source Temporal, resp. rate 18, height 1.702 m (5' 7\"), weight 147 kg (325 lb), SpO2 (!) 88 %.      Patient Vitals for the past 24 hrs:   BP Temp Temp src Pulse Resp SpO2 Height Weight   01/18/24 1200 107/64 -- -- (!) 111 -- (!) 89 % -- --   01/18/24 1025 108/64 -- -- (!) 117 20 92 % -- --   01/18/24 1000 -- -- -- (!) 114 -- 92 % -- --   01/18/24 0800 90/58 -- -- 94 -- 92 % -- --   01/18/24 0600 115/72 36.4 °C (97.5 °F) Temporal 93 -- 94 % -- --   01/18/24 0545 -- -- -- (!) 111 -- 97 % -- --   01/18/24 0530 113/67 -- -- 99 -- 93 % -- --   01/18/24 0515 117/69 -- -- 105 -- 93 % -- --   01/18/24 0500 115/53 -- -- 94 -- 92 % -- --   01/18/24 0445 111/63 -- -- 104 -- 92 % -- --   01/18/24 0430 111/71 -- -- 92 -- 94 % -- --   01/18/24 0415 113/56 -- -- 102 -- 94 % -- --   01/18/24 0400 118/71 -- -- 92 -- 93 % -- --   01/18/24 0345 108/59 36.8 °C (98.2 °F) Temporal 88 -- 96 % -- --   01/18/24 0330 111/59 -- -- 98 -- 95 % -- --   01/18/24 0315 106/67 -- -- 96 -- 94 % -- --   01/18/24 0300 91/69 -- -- 96 -- 94 % -- --   01/18/24 0245 113/63 -- -- 95 -- 94 % -- --   01/18/24 0230 102/71 36.7 °C (98.1 °F) Temporal 98 22 96 % -- --   01/18/24 0215 -- -- -- 110 -- 92 % -- --   01/18/24 0200 -- -- -- 92 -- 95 % -- --   01/18/24 0145 -- -- -- 104 -- 95 % -- --   01/18/24 0130 -- -- -- 103 -- 96 % -- --   01/18/24 0115 109/58 -- -- 106 -- 94 % -- --   01/18/24 0100 107/66 -- -- 105 -- 95 % -- --   01/18/24 0045 (!) 99/49 -- -- 103 -- 94 % -- --   01/18/24 0030 96/59 -- -- (!) 113 -- 91 % -- --   01/18/24 0015 95/50 -- -- 103 -- 94 % -- --   01/18/24 0000 93/54 37.2 °C (99 °F) Temporal 96 22 93 % -- --   01/17/24 2345 89/52 -- -- 106 -- 94 % -- --   01/17/24 2330 88/51 -- -- (!) 112 -- 96 % -- --   01/17/24 2315 -- -- -- (!) 128 18 91 % -- --   01/17/24 2300 89/64 -- -- (!) 124 20 92 % -- --   01/17/24 2245 -- -- -- (!) 114 25 93 % -- --   01/17/24 2230 -- -- -- (!) 124 (!) 27 91 % -- --   01/17/24 " "2215 -- -- -- (!) 120 (!) 35 (!) 89 % -- --   01/17/24 2200 -- -- -- (!) 122 (!) 27 90 % -- --   01/17/24 2145 -- -- -- (!) 123 (!) 29 90 % -- --   01/17/24 2130 -- -- -- 110 19 (!) 88 % -- --   01/17/24 2115 -- -- -- 103 16 93 % -- --   01/17/24 2100 103/65 37 °C (98.6 °F) Temporal (!) 115 16 92 % -- --   01/17/24 2045 -- -- -- (!) 111 15 92 % -- --   01/17/24 2030 -- -- -- 110 15 92 % -- --   01/17/24 2015 -- -- -- (!) 112 14 90 % -- --   01/17/24 2000 (!) 111/49 -- -- (!) 114 18 (!) 89 % -- --   01/17/24 1900 126/72 -- -- 109 18 94 % -- --   01/17/24 1801 121/82 36.9 °C (98.4 °F) Temporal 102 17 95 % -- --   01/17/24 1800 121/82 -- -- 110 20 95 % -- --   01/17/24 1730 -- -- -- -- -- -- 1.702 m (5' 7\") 147 kg (325 lb)   01/17/24 1717 (!) 135/93 -- -- (!) 113 18 95 % -- --   01/17/24 1622 123/87 36.6 °C (97.9 °F) Temporal 100 14 95 % -- --   01/17/24 1500 -- 35.9 °C (96.6 °F) -- 95 -- -- -- --   01/17/24 1445 122/89 -- -- 88 10 94 % -- --   01/17/24 1430 132/63 -- -- 91 15 92 % -- --   01/17/24 1415 128/65 -- -- 83 14 94 % -- --        Constitutional: NAD, pleasant  HEENT: PERRLA, EOMI, no scleral icterus, MMM  CV: tachycardic to 125, normal rhythm, no m/r/g, no JVD  Pulm: On 2 L NC at baseline, CTAB, no increased WOB  GI: Soft, NT, ND, normoactive BS  Extremities: lymphedema in the lower extremities, non-pitting edema to the knee with dry skin   Skin: Warm and dry   Neuro: A&Ox4, no FND  Psych: Mood and affect appropriate to situation      Relevant Results    Lab Results   Component Value Date    WBC 6.3 01/18/2024    HGB 8.1 (L) 01/18/2024    HCT 26.8 (L) 01/18/2024    MCV 96 01/18/2024     (L) 01/18/2024     Lab Results   Component Value Date    GLUCOSE 95 01/18/2024    CALCIUM 9.7 01/18/2024     01/18/2024    K 3.4 (L) 01/18/2024    CO2 34 (H) 01/18/2024     01/18/2024    BUN 17 01/18/2024    CREATININE 0.89 01/18/2024    MG 1.44 (L) 01/18/2024    PHOS 3.3 06/19/2020     Lab Results "   Component Value Date    INR 1.4 (H) 01/18/2024    INR 1.3 (H) 01/17/2024    PROTIME 15.6 (H) 01/18/2024    PROTIME 14.2 (H) 01/17/2024     Lab Results   Component Value Date    TSH 4.75 (H) 07/20/2023              Assessment/Plan   Emma Villela is a 67 y/o F who is POD1 from endoleak repair I/s/o large abdominal aortic aneurysm. She is on carvedilol and received 12.5 mg at approx 1800. RR was called 01/17 for hypotension in setting of afib with RVR. She responded to IV metoprolol and fluid resuscitation. Has longstanding afib and would benefit from continued rate control.     #Afib with RVR  Impression:  - Longstanding history of afib managed with coreg and warfarin. She may benefit from switching outpatient medication to metoprolol given episode of low blood pressures and coreg has a higher risk of hypotension  - HR has been 90s-110 on 1/18, asymptomatic     Recommendations:  - Consider switching to metoprolol tartrate 25mg for rate control once BP stable and SBP over 90  - Recommend maintaining K >4, Mag >2, Phos >3   - Bridge to warfarin once appropriate from a surgical standpoint   - Please obtain complete echo    Cardiology will continue to follow. Patient was seen and discussed with Ld Montez (PGY4) and Dr. Neal Garner.    MAY LOMAS, M4    Plan reviewed and edited where appropriate.  Ld Montez MD, PhD   General Cardiology Fellow, PGY-4

## 2024-01-18 NOTE — PROGRESS NOTES
VASCULAR SURGERY PROGRESS NOTE  Subjective   No pain. Afib RVR overnight requiring multiple metoprolol pushes. Now in afib rate controlled.     Objective   Vitals:    01/18/24 0600   BP: 115/72   Pulse: 93   Resp:    Temp: 36.4 °C (97.5 °F)   SpO2: 94%      Exam:  Constitutional: No acute distress, resting comfortably  Neuro:  AOx3, grossly intact  ENMT: moist mucous membranes  CV: afib on monitor, rate controlled   Pulm: non-labored on room air  GI: soft, non-tender, non-distended, non-tender aorta.   Skin: warm and dry  Musculoskeletal: moving all extremities  Extremities: chronic venous insufficiency noted with hemosiderin staining. Bilateral groin access site c/d/i. Ecchymosis of abdomen (lovenox shots)   Pulses: palpable DP pulse bilaterally     Relevant Results  Medications:  Scheduled Meds:acetaminophen, 650 mg, oral, q4h  amitriptyline, 25 mg, oral, Nightly  aspirin, 81 mg, oral, Daily  atorvastatin, 40 mg, oral, Daily  [Held by provider] carvedilol, 12.5 mg, oral, BID with meals  cholecalciferol, 5,000 Units, oral, Daily  enoxaparin, 1 mg/kg, subcutaneous, BID  ferrous gluconate, 324 mg, oral, Daily  fluticasone furoate-vilanteroL, 1 puff, inhalation, Daily  heparin (porcine), 7,500 Units, subcutaneous, q8h KEVIN  [Held by provider] hydrALAZINE, 100 mg, oral, TID  isosorbide mononitrate ER, 120 mg, oral, Daily  levothyroxine, 200 mcg, oral, Daily  levothyroxine, 50 mcg, oral, Daily  [Held by provider] losartan, 100 mg, oral, Daily  magnesium sulfate, 2 g, intravenous, Once  meclizine, 25 mg, oral, Nightly  oxybutynin, 5 mg, oral, TID  pantoprazole, 40 mg, oral, Daily  polyethylene glycol, 17 g, oral, Daily  potassium chloride, 20 mEq, intravenous, Once      Continuous Infusions:   PRN Meds:.PRN medications: heparin (porcine) 5,000 Units in sodium chloride 0.9% 500 mL irrigation, iodixanol, naloxone, ondansetron, oxyCODONE, sodium chloride    Labs:  Results from last 7 days   Lab Units 01/18/24  0131   WBC  AUTO x10*3/uL 6.3   HEMOGLOBIN g/dL 8.1*   PLATELETS AUTO x10*3/uL 136*      Results from last 7 days   Lab Units 01/18/24  0131   SODIUM mmol/L 141   POTASSIUM mmol/L 3.4*   CHLORIDE mmol/L 102   CO2 mmol/L 34*   BUN mg/dL 17   CREATININE mg/dL 0.89   GLUCOSE mg/dL 95   MAGNESIUM mg/dL 1.44*      Results from last 7 days   Lab Units 01/18/24  0131 01/17/24  0725   INR  1.4* 1.3*   PROTIME seconds 15.6* 14.2*   APTT seconds 40* 53*     Assessment/Plan   67 yo F with PMH of afib (s/p ablation, on warfarin), COPD (on 2L continuous oxygen), morbid obesity, CVI, HTN, hypothyroidism and AAA (s/p EVAR 2016) now s/p right renal artery stenting (6x59m [place] VBX), aortic stent graft placement ([id number] [place] aortic cuff x2), bilateral iliac stent graft placement, and right hypogastric artery coil embolization on 1/17/2024 by Dr. Walton and Dr. Hernandez. Overnight on POD0/POD1 she developed afib RVR with hypotension requiring multiple IV metoprolol pushes and fluid boluses. Electrolytes were repleted.     Plan:  Neuro: acute postoperative pain   - continue tylenol/oxy PRN for pain control  - continue neurovascular checks every 4 hrs  - continue home amitriptyline     CV: HTN, persistent afib (warfarin), CVI, AAA, hypotension   - maintain blood pressure control  - continue home isosorbide, holding home coreg & lasix   - continue atorvastatin/ASA  - resume home warfarin tonight with lovenox bridge   - appreciate cardiology recs     Pulm: h/o COPD on 2L home O2, chronic respiratory failure   - continue to encourage IS hourly while awake  - OOB to chair and increase ambulation as tolerated  - continue home inhalers   - continue home meclizien     FENGI: GERD, urinary incontinence, hypokalemia, hypomagnesemia  - continue regular diet with boost supplements  - continue PPI  - strict I&O  - continue bowel regimen to prevent OIC   - continue home oxybutynin   - remove mullen, await TOV   - replete electrolytes as needed to maintain K>4,  Mg>2   - RFP as clinically indicated    Endo: hypothyroidism   - continue home synthroid     Heme: iron deficiency anemia, acute blood loss anemia   - no s/sx of bleeding  - continue home iron supplement  - CBC as clinically indicated  - daily INR     ID: urinary incontinence   - trend temp q4  - CBC as clinically indicated    Dispo:   - will obtain PT eval  - continue care on regular nursing floor    LUIS Mcclain-CNP

## 2024-01-18 NOTE — CARE PLAN
The patient's goals for the shift include      The clinical goals for the shift include pt will sleep a minimum of 4 hours by the end of this shift    Problem: Fall/Injury  Goal: Not fall by end of shift  Outcome: Progressing  Goal: Be free from injury by end of the shift  Outcome: Progressing  Goal: Verbalize understanding of personal risk factors for fall in the hospital  Outcome: Progressing  Goal: Verbalize understanding of risk factor reduction measures to prevent injury from fall in the home  Outcome: Progressing  Goal: Use assistive devices by end of the shift  Outcome: Progressing  Goal: Pace activities to prevent fatigue by end of the shift  Outcome: Progressing

## 2024-01-18 NOTE — SIGNIFICANT EVENT
RR called for hypotension in the setting of a fib with RVR    Briefly, Emma Villela is a 67 y/o F who is POD0 from endoleak repair I/s/o large abdominal aortic aneurysm. She is on carvedilol and received 12.5 mg at approx 1800    Bedside reports that her  BP was 77/40s with rapid heart rates >120s. BP now improved to SBP 80s-90s with HR 110s-120s. EKG with a fib with HR 130s. Primary team at bedside.     IV metoprolol 5 mg IV was given per primary team with HR response to low 100s, intermittently to 110s. Repeat MAP 62. 500cc LR bolus ordered per primary team to run over 1 hr.    Agree with fluid resuscitation. If HR remains persistently >120 with a  or greater then could consider repeat IV metoprolol. If BP not able to tolerate then could consider digoxin. Would avoid amiodarone as pt is not anticoagulated and is as risk for thromboembolic events iso chronic a fib. Recommend maintaining K >4, Mag >2, Phos >3    RR team will be available to assist as needed    Jessica Sanchez MD  Internal Medicine-Pediatrics, PGY4    DACR

## 2024-01-18 NOTE — PROGRESS NOTES
DC Planning:  Went in and met with the pt, confirmed demographics.   IMM explained and copy given to pt. Signed and placed in chart.   No home going needs anticipated for this pt.    Emma Villela is a 68 y.o. female on day 1 of admission presenting with Type I endoleak of aortic graft (CMS/HCC).    SISSY TORO

## 2024-01-18 NOTE — NURSING NOTE
Pt. Asked that we don;t pull mullen yet as she is not sure she can make it to the bathroom in time and doesn't want an external cath. Per Dr. Horn ok to leave mullen in.

## 2024-01-18 NOTE — SIGNIFICANT EVENT
RAPID RESPONSE RN NOTE:       01/18/24 0812   Onset Documentation   Rapid Response Initiated By Radar auto page   Location/Room [facility]  ([id number])   Pager Time [phone]   Arrival Time 1015   Event End Time 1028   Level II Called No   Primary Reason for Call Radar auto page  (RADAR score = 6)     RADAR auto page received. Temp 36.4, HR 94, RR 22, BP 90/58, pulse ox 92%.  On my arrival, pt in bed. Tearful and stuffy nose at the time. Vitlas rechecked - 's, /64, RR 20, pulse ox 92% on 4LNC.  Cardiology consult has been placed by primary team for Afib. Per pt, she has history of Afib.  Pt is in no acute distress at this time.  No acute changes or concerns per bedside RN.  Please page rapid response for any clinical deterioration or assistance needed.

## 2024-01-19 ENCOUNTER — APPOINTMENT (OUTPATIENT)
Dept: CARDIOLOGY | Facility: HOSPITAL | Age: 69
DRG: 253 | End: 2024-01-19
Payer: MEDICARE

## 2024-01-19 LAB
ANION GAP SERPL CALC-SCNC: 10 MMOL/L (ref 10–20)
AORTIC VALVE PEAK VELOCITY: 1.51 M/S
AV PEAK GRADIENT: 9.1 MMHG
AVA (PEAK VEL): 2.84 CM2
BODY SURFACE AREA: 2.64 M2
BUN SERPL-MCNC: 16 MG/DL (ref 6–23)
CALCIUM SERPL-MCNC: 10.5 MG/DL (ref 8.6–10.6)
CHLORIDE SERPL-SCNC: 98 MMOL/L (ref 98–107)
CO2 SERPL-SCNC: 34 MMOL/L (ref 21–32)
CREAT SERPL-MCNC: 0.8 MG/DL (ref 0.5–1.05)
EGFRCR SERPLBLD CKD-EPI 2021: 80 ML/MIN/1.73M*2
ERYTHROCYTE [DISTWIDTH] IN BLOOD BY AUTOMATED COUNT: 14.5 % (ref 11.5–14.5)
GLUCOSE SERPL-MCNC: 115 MG/DL (ref 74–99)
HCT VFR BLD AUTO: 29.7 % (ref 36–46)
HGB BLD-MCNC: 9.2 G/DL (ref 12–16)
INR PPP: 1.4 (ref 0.9–1.1)
LEFT VENTRICULAR OUTFLOW TRACT DIAMETER: 2.2 CM
MAGNESIUM SERPL-MCNC: 1.62 MG/DL (ref 1.6–2.4)
MCH RBC QN AUTO: 30.5 PG (ref 26–34)
MCHC RBC AUTO-ENTMCNC: 31 G/DL (ref 32–36)
MCV RBC AUTO: 98 FL (ref 80–100)
NRBC BLD-RTO: 0 /100 WBCS (ref 0–0)
PLATELET # BLD AUTO: 130 X10*3/UL (ref 150–450)
POTASSIUM SERPL-SCNC: 3.5 MMOL/L (ref 3.5–5.3)
PROTHROMBIN TIME: 16.2 SECONDS (ref 9.8–12.8)
RBC # BLD AUTO: 3.02 X10*6/UL (ref 4–5.2)
RIGHT VENTRICLE PEAK SYSTOLIC PRESSURE: 57.5 MMHG
SODIUM SERPL-SCNC: 138 MMOL/L (ref 136–145)
WBC # BLD AUTO: 6 X10*3/UL (ref 4.4–11.3)

## 2024-01-19 PROCEDURE — 93306 TTE W/DOPPLER COMPLETE: CPT

## 2024-01-19 PROCEDURE — 83735 ASSAY OF MAGNESIUM: CPT | Performed by: STUDENT IN AN ORGANIZED HEALTH CARE EDUCATION/TRAINING PROGRAM

## 2024-01-19 PROCEDURE — 2500000004 HC RX 250 GENERAL PHARMACY W/ HCPCS (ALT 636 FOR OP/ED): Performed by: STUDENT IN AN ORGANIZED HEALTH CARE EDUCATION/TRAINING PROGRAM

## 2024-01-19 PROCEDURE — 80048 BASIC METABOLIC PNL TOTAL CA: CPT | Performed by: STUDENT IN AN ORGANIZED HEALTH CARE EDUCATION/TRAINING PROGRAM

## 2024-01-19 PROCEDURE — 1100000001 HC PRIVATE ROOM DAILY

## 2024-01-19 PROCEDURE — 99233 SBSQ HOSP IP/OBS HIGH 50: CPT | Performed by: NURSE PRACTITIONER

## 2024-01-19 PROCEDURE — 85027 COMPLETE CBC AUTOMATED: CPT | Performed by: STUDENT IN AN ORGANIZED HEALTH CARE EDUCATION/TRAINING PROGRAM

## 2024-01-19 PROCEDURE — 36415 COLL VENOUS BLD VENIPUNCTURE: CPT | Performed by: STUDENT IN AN ORGANIZED HEALTH CARE EDUCATION/TRAINING PROGRAM

## 2024-01-19 PROCEDURE — 2500000004 HC RX 250 GENERAL PHARMACY W/ HCPCS (ALT 636 FOR OP/ED): Performed by: NURSE PRACTITIONER

## 2024-01-19 PROCEDURE — 2500000001 HC RX 250 WO HCPCS SELF ADMINISTERED DRUGS (ALT 637 FOR MEDICARE OP): Performed by: STUDENT IN AN ORGANIZED HEALTH CARE EDUCATION/TRAINING PROGRAM

## 2024-01-19 PROCEDURE — 94667 MNPJ CHEST WALL 1ST: CPT

## 2024-01-19 PROCEDURE — 85610 PROTHROMBIN TIME: CPT | Performed by: STUDENT IN AN ORGANIZED HEALTH CARE EDUCATION/TRAINING PROGRAM

## 2024-01-19 PROCEDURE — 94640 AIRWAY INHALATION TREATMENT: CPT

## 2024-01-19 PROCEDURE — 97161 PT EVAL LOW COMPLEX 20 MIN: CPT | Mod: GP

## 2024-01-19 PROCEDURE — 94762 N-INVAS EAR/PLS OXIMTRY CONT: CPT

## 2024-01-19 PROCEDURE — 93306 TTE W/DOPPLER COMPLETE: CPT | Performed by: INTERNAL MEDICINE

## 2024-01-19 PROCEDURE — 2500000001 HC RX 250 WO HCPCS SELF ADMINISTERED DRUGS (ALT 637 FOR MEDICARE OP): Performed by: NURSE PRACTITIONER

## 2024-01-19 RX ORDER — MAGNESIUM SULFATE HEPTAHYDRATE 40 MG/ML
2 INJECTION, SOLUTION INTRAVENOUS ONCE
Status: DISCONTINUED | OUTPATIENT
Start: 2024-01-19 | End: 2024-01-21

## 2024-01-19 RX ORDER — POTASSIUM CHLORIDE 20 MEQ/1
40 TABLET, EXTENDED RELEASE ORAL ONCE
Status: DISCONTINUED | OUTPATIENT
Start: 2024-01-19 | End: 2024-01-21

## 2024-01-19 RX ORDER — FUROSEMIDE 10 MG/ML
40 INJECTION INTRAMUSCULAR; INTRAVENOUS ONCE
Status: COMPLETED | OUTPATIENT
Start: 2024-01-19 | End: 2024-01-19

## 2024-01-19 RX ORDER — METOPROLOL TARTRATE 25 MG/1
25 TABLET, FILM COATED ORAL EVERY 6 HOURS
Status: DISCONTINUED | OUTPATIENT
Start: 2024-01-19 | End: 2024-01-20

## 2024-01-19 RX ORDER — MAGNESIUM SULFATE HEPTAHYDRATE 40 MG/ML
4 INJECTION, SOLUTION INTRAVENOUS ONCE
Status: COMPLETED | OUTPATIENT
Start: 2024-01-19 | End: 2024-01-19

## 2024-01-19 RX ORDER — POTASSIUM CHLORIDE 20 MEQ/1
40 TABLET, EXTENDED RELEASE ORAL ONCE
Status: COMPLETED | OUTPATIENT
Start: 2024-01-19 | End: 2024-01-19

## 2024-01-19 RX ORDER — FUROSEMIDE 10 MG/ML
20 INJECTION INTRAMUSCULAR; INTRAVENOUS ONCE
Status: COMPLETED | OUTPATIENT
Start: 2024-01-19 | End: 2024-01-19

## 2024-01-19 RX ORDER — FUROSEMIDE 20 MG/1
40 TABLET ORAL DAILY
Status: DISCONTINUED | OUTPATIENT
Start: 2024-01-20 | End: 2024-01-21 | Stop reason: HOSPADM

## 2024-01-19 RX ORDER — FUROSEMIDE 10 MG/ML
40 INJECTION INTRAMUSCULAR; INTRAVENOUS ONCE
Status: DISCONTINUED | OUTPATIENT
Start: 2024-01-19 | End: 2024-01-19

## 2024-01-19 RX ADMIN — ENOXAPARIN SODIUM 150 MG: 150 INJECTION SUBCUTANEOUS at 10:03

## 2024-01-19 RX ADMIN — AMITRIPTYLINE HYDROCHLORIDE 25 MG: 25 TABLET, FILM COATED ORAL at 21:06

## 2024-01-19 RX ADMIN — ACETAMINOPHEN 650 MG: 325 TABLET ORAL at 10:01

## 2024-01-19 RX ADMIN — ACETAMINOPHEN 650 MG: 325 TABLET ORAL at 21:06

## 2024-01-19 RX ADMIN — LEVOTHYROXINE SODIUM 50 MCG: 50 TABLET ORAL at 06:23

## 2024-01-19 RX ADMIN — LEVOTHYROXINE SODIUM 200 MCG: 100 TABLET ORAL at 06:22

## 2024-01-19 RX ADMIN — METOPROLOL TARTRATE 25 MG: 25 TABLET, FILM COATED ORAL at 16:42

## 2024-01-19 RX ADMIN — FERROUS GLUCONATE 324 MG: 324 TABLET ORAL at 10:01

## 2024-01-19 RX ADMIN — WARFARIN SODIUM 7.5 MG: 5 TABLET ORAL at 17:47

## 2024-01-19 RX ADMIN — MAGNESIUM SULFATE 4 G: 4 INJECTION INTRAVENOUS at 14:40

## 2024-01-19 RX ADMIN — OXYBUTYNIN CHLORIDE 5 MG: 5 TABLET ORAL at 10:01

## 2024-01-19 RX ADMIN — MECLIZINE HYDROCHLORIDE 25 MG: 25 TABLET ORAL at 21:06

## 2024-01-19 RX ADMIN — FUROSEMIDE 20 MG: 10 INJECTION, SOLUTION INTRAVENOUS at 21:06

## 2024-01-19 RX ADMIN — ENOXAPARIN SODIUM 150 MG: 150 INJECTION SUBCUTANEOUS at 21:06

## 2024-01-19 RX ADMIN — ATORVASTATIN CALCIUM 40 MG: 40 TABLET, FILM COATED ORAL at 21:06

## 2024-01-19 RX ADMIN — ASPIRIN 81 MG: 81 TABLET, COATED ORAL at 10:02

## 2024-01-19 RX ADMIN — PERFLUTREN 2.5 ML OF DILUTION: 6.52 INJECTION, SUSPENSION INTRAVENOUS at 16:11

## 2024-01-19 RX ADMIN — METOPROLOL TARTRATE 25 MG: 25 TABLET, FILM COATED ORAL at 21:06

## 2024-01-19 RX ADMIN — ACETAMINOPHEN 650 MG: 325 TABLET ORAL at 14:37

## 2024-01-19 RX ADMIN — POTASSIUM CHLORIDE 40 MEQ: 1500 TABLET, EXTENDED RELEASE ORAL at 14:37

## 2024-01-19 RX ADMIN — METOPROLOL TARTRATE 25 MG: 25 TABLET, FILM COATED ORAL at 10:02

## 2024-01-19 RX ADMIN — ISOSORBIDE MONONITRATE 120 MG: 30 TABLET, EXTENDED RELEASE ORAL at 10:02

## 2024-01-19 RX ADMIN — PANTOPRAZOLE SODIUM 40 MG: 40 TABLET, DELAYED RELEASE ORAL at 10:02

## 2024-01-19 RX ADMIN — FLUTICASONE FUROATE AND VILANTEROL TRIFENATATE 1 PUFF: 200; 25 POWDER RESPIRATORY (INHALATION) at 09:10

## 2024-01-19 RX ADMIN — OXYBUTYNIN CHLORIDE 5 MG: 5 TABLET ORAL at 14:39

## 2024-01-19 RX ADMIN — OXYBUTYNIN CHLORIDE 5 MG: 5 TABLET ORAL at 21:06

## 2024-01-19 RX ADMIN — FUROSEMIDE 40 MG: 10 INJECTION, SOLUTION INTRAVENOUS at 12:18

## 2024-01-19 RX ADMIN — ACETAMINOPHEN 650 MG: 325 TABLET ORAL at 04:13

## 2024-01-19 RX ADMIN — Medication 5000 UNITS: at 10:01

## 2024-01-19 ASSESSMENT — COGNITIVE AND FUNCTIONAL STATUS - GENERAL
STANDING UP FROM CHAIR USING ARMS: A LITTLE
DRESSING REGULAR UPPER BODY CLOTHING: A LITTLE
DRESSING REGULAR UPPER BODY CLOTHING: A LITTLE
TOILETING: A LITTLE
HELP NEEDED FOR BATHING: A LITTLE
DRESSING REGULAR LOWER BODY CLOTHING: A LITTLE
MOBILITY SCORE: 21
CLIMB 3 TO 5 STEPS WITH RAILING: A LITTLE
WALKING IN HOSPITAL ROOM: A LITTLE
CLIMB 3 TO 5 STEPS WITH RAILING: A LITTLE
CLIMB 3 TO 5 STEPS WITH RAILING: A LITTLE
DAILY ACTIVITIY SCORE: 20
WALKING IN HOSPITAL ROOM: A LITTLE
HELP NEEDED FOR BATHING: A LITTLE
WALKING IN HOSPITAL ROOM: A LITTLE
MOBILITY SCORE: 21
DAILY ACTIVITIY SCORE: 20
STANDING UP FROM CHAIR USING ARMS: A LITTLE
DRESSING REGULAR LOWER BODY CLOTHING: A LITTLE
TOILETING: A LITTLE
MOBILITY SCORE: 22

## 2024-01-19 ASSESSMENT — PAIN SCALES - GENERAL
PAINLEVEL_OUTOF10: 0 - NO PAIN
PAINLEVEL_OUTOF10: 3
PAINLEVEL_OUTOF10: 3

## 2024-01-19 ASSESSMENT — PAIN - FUNCTIONAL ASSESSMENT
PAIN_FUNCTIONAL_ASSESSMENT: 0-10

## 2024-01-19 ASSESSMENT — ACTIVITIES OF DAILY LIVING (ADL): ADL_ASSISTANCE: INDEPENDENT

## 2024-01-19 NOTE — SIGNIFICANT EVENT
Rapid Response RN Note     01/19/24 0653   Onset Documentation   Rapid Response Initiated By Radar auto page   Location/Room [facility]  ([id number])   Pager Time [phone]   Arrival Time 0653   Event End Time 0656   Primary Reason for Call Radar auto page  (RADAR 6)     RADAR score 6 due to the following VS: T 36.5 °Celsius;  ; RR 21; /79; SPO2 92% on 5 L/m NC.      Reviewed above VS with bedside RN via phone.  Patient with documented COPD on home oxygen.  Plan to diurese today per primary service.  No interventions by rapid response team indicated at this time.      Staff to page rapid response for any concerns or acute change in condition/VS.

## 2024-01-19 NOTE — PROGRESS NOTES
Physical Therapy    Physical Therapy Evaluation    Patient Name: Emma Villela  MRN: [id number]  Today's Date: 1/19/2024   Time Calculation  Start Time: 0947  Stop Time: 1021  Time Calculation (min): 34 min    Assessment/Plan   PT Assessment  PT Assessment Results: Decreased endurance, Impaired balance  Rehab Prognosis: Good  Barriers to Discharge: none  Evaluation/Treatment Tolerance: Patient limited by fatigue  Medical Staff Made Aware: Yes  Strengths: Attitude of self, Coping skills, Support of extended family/friends  Barriers to Participation:  (none)  End of Session Communication: Bedside nurse  Assessment Comment: The pt presented with safe mobility using a wheeled walker.  End of Session Patient Position: Bed, 2 rail up, Alarm off, not on at start of session  IP OR SWING BED PT PLAN  Inpatient or Swing Bed: Inpatient  PT Plan  Treatment/Interventions: Bed mobility, Transfer training, Gait training, Stair training, Balance training, Strengthening, Endurance training, Therapeutic exercise, Therapeutic activity, Home exercise program  PT Plan: Skilled PT  PT Frequency: 3 times per week  PT Discharge Recommendations: No PT needed after discharge  Equipment Recommended upon Discharge:  (none)  PT Recommended Transfer Status: Stand by assist  PT - OK to Discharge: Yes      Subjective   General Visit Information:  General  Reason for Referral: Abdominal aortic aneurysm without rupture s/p right renal artery parallel stenting  Past Medical History Relevant to Rehab: A-fib s/p ablation, COPD on 2L home O2, morbid obesity, HTN, hypothyroidism, AAA s/p EVAR '16 s/p right renal artery stenting, GERD, iron deficiency anemia, urinary incontinence.  Prior to Session Communication: Bedside nurse  Patient Position Received: Bed, 3 rail up, Alarm off, not on at start of session  Preferred Learning Style: verbal, visual, written  General Comment: The pt was pleasant, cooperative and willing to participate in therapy.  Home  Living:  Home Living  Type of Home: House  Lives With: Spouse  Home Adaptive Equipment: Walker rolling or standard, Cane, Wheelchair-manual  Home Layout: One level  Home Access: Stairs to enter with rails  Entrance Stairs-Rails: Right  Entrance Stairs-Number of Steps: 3  Bathroom Shower/Tub: Walk-in shower  Bathroom Toilet: Standard  Bathroom Equipment: Grab bars in shower, Built-in shower seat  Prior Level of Function:  Prior Function Per Pt/Caregiver Report  Level of [place]: Independent with ADLs and functional transfers, Independent with homemaking with ambulation  Receives Help From: Family  ADL Assistance: Independent  Homemaking Assistance: Independent  Ambulatory Assistance: Independent  Vocational: Retired  Leisure: read and nicholas  Hand Dominance: Left  Prior Function Comments: The pt is independent with all mobility using a wheeled walker mainly and a standard cane as needed in the household and in the community.  Precautions:  Precautions  Hearing/Visual Limitations: Hearing and vision WFL with glasses.  Medical Precautions: Fall precautions, Oxygen therapy device and L/min  Precautions Comment: The pt in compliance with precautions throughout PT evaluation.  Vital Signs:  Vital Signs  Heart Rate: 108 (irregular due to A-fib)  Heart Rate Source: Monitor  Resp: 18  SpO2: 92 % (6L O2)  BP: 123/77  Patient Position: Lying    Objective   Pain:  Pain Assessment  Pain Assessment: 0-10  Pain Score: 3  Pain Type: Acute pain  Pain Location: Head (headache)  Cognition:  Cognition  Overall Cognitive Status: Within Functional Limits  Orientation Level: Oriented X4    General Assessments:  General Observation  General Observation: The pt presented with a steady gait using a wheeled walker.       Activity Tolerance  Endurance: Decreased tolerance for upright activites    Strength  Strength Comments: WFL  Coordination  Movements are Fluid and Coordinated: Yes    Postural Control  Postural Control: Within  Functional Limits  Posture Comment: Pt presented with good sitting and standing posture using a wheeled walker.    Static Sitting Balance  Static Sitting-Balance Support: Feet supported, No upper extremity supported  Static Sitting-Level of Assistance: Independent  Dynamic Sitting Balance  Dynamic Sitting-Balance Support: No upper extremity supported, Feet supported  Dynamic Sitting-Comments: independent    Static Standing Balance  Static Standing-Balance Support: Bilateral upper extremity supported  Static Standing-Level of Assistance: Distant supervision  Static Standing-Comment/Number of Minutes: using a wheeled walker  Dynamic Standing Balance  Dynamic Standing-Balance Support: Bilateral upper extremity supported  Dynamic Standing-Comments: supervision assistance using a wheeled walker  Functional Assessments:     Transfers  Transfer: Yes  Transfer 1  Transfer From 1: Sit to  Transfer to 1: Stand  Transfer Device 1: Walker  Transfer Level of Assistance 1: Close supervision  Transfers 2  Transfer From 2: Stand to  Transfer to 2: Sit  Transfer Device 2: Walker  Transfer Level of Assistance 2: Distant supervision    Ambulation/Gait Training  Ambulation/Gait Training Performed: Yes  Ambulation/Gait Training 1  Surface 1: Level tile  Device 1: Rolling walker  Assistance 1: Distant supervision  Quality of Gait 1:  (steady)  Comments/Distance (ft) 1: 15ft  Extremity/Trunk Assessments:  Cervical Spine   Cervical Spine: Within Functional Limits  Lumbar Spine   Lumbar Spine : Within Functional Limits    RUE   RUE : Within Functional Limits  LUE   LUE: Within Functional Limits  RLE   RLE : Within Functional Limits  LLE   LLE : Within Functional Limits  Outcome Measures:  [facility] Basic Mobility  Turning from your back to your side while in a flat bed without using bedrails: None  Moving from lying on your back to sitting on the side of a flat bed without using bedrails: None  Moving to and from bed to chair (including a  wheelchair): None  Standing up from a chair using your arms (e.g. wheelchair or bedside chair): A little  To walk in hospital room: A little  Climbing 3-5 steps with railing: A little  Basic Mobility - Total Score: 21    Encounter Problems       Encounter Problems (Active)       Balance       STG - Maintains independent dynamic standing balance with upper extremity support using a wheeled walker. (Progressing)       Start:  01/19/24    Expected End:  02/02/24            STG - Maintains independent static standing balance with upper extremity support using a wheeled walker. (Progressing)       Start:  01/19/24    Expected End:  02/02/24               Mobility       STG - Patient will ambulate 125ft using a wheeled walker, independently. (Progressing)       Start:  01/19/24    Expected End:  02/02/24            STG - Patient will ascend and descend four to six stairs using a standard cane and one rail, independently. (Progressing)       Start:  01/19/24    Expected End:  02/02/24               Transfers       STG - Transfer from bed to chair using a wheeled walker, independently. (Progressing)       Start:  01/19/24    Expected End:  02/02/24            STG - Patient to transfer to and from sit to supine, independently. (Progressing)       Start:  01/19/24    Expected End:  02/02/24            STG - Patient will transfer sit to and from stand using a wheeled walker, independently. (Progressing)       Start:  01/19/24    Expected End:  02/02/24                   Education Documentation  Precautions, taught by Norman Suazo PT at 1/19/2024 10:58 AM.  Learner: Patient  Readiness: Acceptance  Method: Explanation, Demonstration  Response: Verbalizes Understanding, Demonstrated Understanding    Body Mechanics, taught by Norman Suazo PT at 1/19/2024 10:58 AM.  Learner: Patient  Readiness: Acceptance  Method: Explanation, Demonstration  Response: Verbalizes Understanding, Demonstrated Understanding    Home Exercise  Program, taught by Norman Suazo, PT at 1/19/2024 10:58 AM.  Learner: Patient  Readiness: Acceptance  Method: Explanation, Demonstration  Response: Verbalizes Understanding, Demonstrated Understanding    Mobility Training, taught by Norman Suazo PT at 1/19/2024 10:58 AM.  Learner: Patient  Readiness: Acceptance  Method: Explanation, Demonstration  Response: Verbalizes Understanding, Demonstrated Understanding    Education Comments  No comments found.

## 2024-01-19 NOTE — PROGRESS NOTES
"Subjective:  AF 90-100s up to 140s with activity  Denies CP/ SOB/dizziness, palpitations/procedural discomfort   Reports breathing is at baseline. Although oxygen is at 6L nc    Objective:     Physical Exam  General: chronically ill appearing obese female sitting on edge of bed, 6L nc, NAD   Skin:  warm and dry  HEENT: EOMI. MMM  Cardiovascular: irreg tachy  Respiratory: reduced A/E  Gastrointestinal: soft, non-distended  Genitourinary: yellow urine per BSC   Extremities: lymphedema/?elephantitis/chronic venous insufficiency/dry skin/pitting edema up to below knee   Neurological: no gross focal deficits  Psychiatric: appropriate mood and affect         Blood pressure 129/85, pulse (!) 115, temperature 36.3 °C (97.3 °F), resp. rate 19, height 1.702 m (5' 7\"), weight 147 kg (325 lb), SpO2 95 %.      Patient Vitals for the past 24 hrs:   BP Temp Temp src Pulse Resp SpO2   01/19/24 1136 -- -- -- -- -- 95 %   01/19/24 1129 129/85 36.3 °C (97.3 °F) -- (!) 115 19 93 %   01/19/24 0947 123/77 -- -- 108 18 92 %   01/19/24 0738 -- 36 °C (96.8 °F) -- -- -- --   01/19/24 0700 104/84 36.5 °C (97.7 °F) Temporal (!) 112 25 91 %   01/19/24 0600 -- -- -- (!) 115 21 92 %   01/19/24 0500 (!) 110/94 37 °C (98.6 °F) Temporal 90 16 94 %   01/19/24 0400 114/79 -- -- 108 21 93 %   01/19/24 0300 84/71 36.5 °C (97.7 °F) Temporal 105 16 94 %   01/19/24 0200 124/71 -- -- 106 17 90 %   01/19/24 0156 -- 36.6 °C (97.9 °F) -- -- -- --   01/19/24 0100 120/78 36.5 °C (97.7 °F) Temporal (!) 112 17 92 %   01/19/24 0028 -- 36.5 °C (97.7 °F) -- -- -- --   01/19/24 0000 105/86 36.5 °C (97.7 °F) Temporal (!) 126 15 92 %   01/18/24 2300 130/88 36.7 °C (98.1 °F) Temporal 108 16 92 %   01/18/24 2200 135/74 -- -- (!) 118 20 93 %   01/18/24 2130 -- -- -- (!) 113 18 94 %   01/18/24 2115 -- -- -- 96 18 96 %   01/18/24 2100 131/89 36.5 °C (97.7 °F) Temporal 97 25 95 %   01/18/24 2045 -- -- -- 101 24 97 %   01/18/24 2030 -- -- -- 98 19 93 %   01/18/24 2015 -- -- -- " 108 21 93 %   01/18/24 2000 -- -- -- (!) 131 20 (!) 84 %   01/18/24 1945 -- -- -- 99 20 91 %   01/18/24 1930 119/73 36.7 °C (98.1 °F) Temporal (!) 115 22 92 %   01/18/24 1915 -- -- -- 105 13 93 %   01/18/24 1900 -- -- -- (!) 119 21 92 %   01/18/24 1845 -- -- -- 108 21 92 %   01/18/24 1830 -- -- -- (!) 123 (!) 30 96 %   01/18/24 1815 -- -- -- 107 21 92 %   01/18/24 1800 -- 36 °C (96.8 °F) -- (!) 111 16 90 %   01/18/24 1600 99/65 36.9 °C (98.4 °F) Temporal 105 18 94 %   01/18/24 1400 116/60 -- -- 97 18 (!) 88 %            Relevant Results    Lab Results   Component Value Date    WBC 6.3 01/18/2024    HGB 8.1 (L) 01/18/2024    HCT 26.8 (L) 01/18/2024    MCV 96 01/18/2024     (L) 01/18/2024     Lab Results   Component Value Date    GLUCOSE 113 (H) 01/18/2024    CALCIUM 10.1 01/18/2024     01/18/2024    K 3.9 01/18/2024    CO2 34 (H) 01/18/2024     01/18/2024    BUN 17 01/18/2024    CREATININE 0.86 01/18/2024    MG 1.44 (L) 01/18/2024    PHOS 3.3 06/19/2020     Lab Results   Component Value Date    INR 1.4 (H) 01/18/2024    INR 1.3 (H) 01/17/2024    PROTIME 15.6 (H) 01/18/2024    PROTIME 14.2 (H) 01/17/2024     Lab Results   Component Value Date    TSH 4.75 (H) 07/20/2023              Assessment/Plan   Emma Villela is a 69 y/o F who is POD2 from endoleak repair I/s/o large abdominal aortic aneurysm. She is on carvedilol and received 12.5 mg at approx 1800. RR was called 01/17 for hypotension in setting of afib with RVR. She responded to IV metoprolol and fluid resuscitation. Has longstanding afib and would benefit from continued rate control.     #Afib with RVR  Impression:  - Longstanding history of afib managed with coreg and warfarin. She may benefit from switching outpatient medication to metoprolol given episode of low blood pressures and coreg has a higher risk of hypotension  - HR has been 90s-110 on 1/18, asymptomatic     Recommendations:  - Need to identify and treat other possible causes for  RVR (pain, infection, volume status, anemia)   - Increase metoprolol tartrate to 25mg PO q6h  - Recommend maintaining K >4, Mag >2, Phos >3   - Continue home warfarin  - Would resume home lasix     - Please obtain complete echo    Cardiology will follow      LUIS Ponce-CNP  Cardiology Consults    Please call with any questions  Pager [phone] M-F 8a-5p; Saturday 8a-2p  Pager [phone] all other times

## 2024-01-19 NOTE — PROGRESS NOTES
VASCULAR SURGERY PROGRESS NOTE  Subjective   No major events overnight  Some low SpO2, likely 2/2 patient not using CPAP, and so NC turned up to 5-6L    Objective   Vitals:    01/19/24 0738   BP:    Pulse:    Resp:    Temp: 36 °C (96.8 °F)   SpO2:       Exam:  Constitutional: No acute distress, resting comfortably  Neuro:  AOx3, grossly intact  ENMT: moist mucous membranes  CV: afib on monitor, rate controlled   Pulm: non-labored on room air  GI: soft, non-tender, non-distended, non-tender aorta.   Skin: warm and dry  Musculoskeletal: moving all extremities  Extremities: chronic venous insufficiency noted with hemosiderin staining. Bilateral groin access site c/d/i. Ecchymosis of abdomen (lovenox shots)   Pulses: palpable DP pulse bilaterally     Relevant Results  Medications:  Scheduled Meds:acetaminophen, 650 mg, oral, q4h  amitriptyline, 25 mg, oral, Nightly  aspirin, 81 mg, oral, Daily  atorvastatin, 40 mg, oral, Daily  cholecalciferol, 5,000 Units, oral, Daily  enoxaparin, 1 mg/kg, subcutaneous, BID  ferrous gluconate, 324 mg, oral, Daily  fluticasone furoate-vilanteroL, 1 puff, inhalation, Daily  furosemide, 40 mg, intravenous, Once  [Held by provider] hydrALAZINE, 100 mg, oral, TID  isosorbide mononitrate ER, 120 mg, oral, Daily  levothyroxine, 200 mcg, oral, Daily  levothyroxine, 50 mcg, oral, Daily  [Held by provider] losartan, 100 mg, oral, Daily  meclizine, 25 mg, oral, Nightly  metoprolol tartrate, 25 mg, oral, BID  oxybutynin, 5 mg, oral, TID  pantoprazole, 40 mg, oral, Daily  polyethylene glycol, 17 g, oral, Daily  warfarin, 7.5 mg, oral, Daily      Continuous Infusions:   PRN Meds:.PRN medications: heparin (porcine) 5,000 Units in sodium chloride 0.9% 500 mL irrigation, iodixanol, naloxone, ondansetron, oxyCODONE, sodium chloride    Labs:  Results from last 7 days   Lab Units 01/18/24  0131   WBC AUTO x10*3/uL 6.3   HEMOGLOBIN g/dL 8.1*   PLATELETS AUTO x10*3/uL 136*      Results from last 7 days    Lab Units 01/18/24  1050 01/18/24  0131   SODIUM mmol/L 140 141   POTASSIUM mmol/L 3.9 3.4*   CHLORIDE mmol/L 101 102   CO2 mmol/L 34* 34*   BUN mg/dL 17 17   CREATININE mg/dL 0.86 0.89   GLUCOSE mg/dL 113* 95   MAGNESIUM mg/dL  --  1.44*      Results from last 7 days   Lab Units 01/18/24  0131 01/17/24  0725   INR  1.4* 1.3*   PROTIME seconds 15.6* 14.2*   APTT seconds 40* 53*     Assessment/Plan   69 yo F with PMH of afib (s/p ablation, on warfarin), COPD (on 2L continuous oxygen), morbid obesity, CVI, HTN, hypothyroidism and AAA (s/p EVAR 2016) now s/p right renal artery stenting (6x59m [place] VBX), aortic stent graft placement ([id number] [place] aortic cuff x2), bilateral iliac stent graft placement, and right hypogastric artery coil embolization on 1/17/2024 by Dr. Walton and Dr. Hernandez. Overnight on POD0/POD1 she developed afib RVR with hypotension requiring multiple IV metoprolol pushes and fluid boluses. Electrolytes were repleted.     1/19: NC at 5-6L, suspect combination edema and CPAP noncompliance. HR ranging 90-120s with metoprolol at 25mg BID. May require up-titration metoprolol    Plan:  Neuro: acute postoperative pain   - continue tylenol/oxy PRN for pain control  - continue neurovascular checks every 4 hrs  - continue home amitriptyline     CV: HTN, persistent afib (warfarin), CVI, AAA, hypotension   - maintain blood pressure control  - continue home isosorbide, home coreg switched to metoprolol. 40mg IV lasix today, restart home PO lasix 1/20  - continue atorvastatin/ASA  - home warfarin resumed 1/18, briding with lovenox  - Cardiology involved for Afib with RVR --> Transitioned to metoprolol. Will discuss any indicated increase today    Pulm: h/o COPD on 2L home O2, chronic respiratory failure   - continue to encourage IS hourly while awake  - OOB to chair and increase ambulation as tolerated  - continue home inhalers   - continue home meclizien   - Respiratory therapy. Diuresis. Home CPAP. Monitor O2  requirements.    FENGI: GERD, urinary incontinence, hypokalemia, hypomagnesemia  - continue regular diet with boost supplements  - continue PPI  - strict I&O  - continue bowel regimen to prevent OIC   - continue home oxybutynin   - voiding after mullen removal  - replete electrolytes as needed to maintain K>4, Mg>2   - RFP as clinically indicated    Endo: hypothyroidism   - continue home synthroid     Heme: iron deficiency anemia, acute blood loss anemia   - no s/sx of bleeding  - continue home iron supplement  - CBC as clinically indicated  - daily INR     ID: urinary incontinence   - trend temp q4  - CBC as clinically indicated    Dispo:   - will obtain PT eval  - continue care on regular nursing floor    Discussed with Dr. Isabelle Obrien MD

## 2024-01-20 LAB
ANION GAP SERPL CALC-SCNC: 15 MMOL/L (ref 10–20)
BUN SERPL-MCNC: 15 MG/DL (ref 6–23)
CALCIUM SERPL-MCNC: 10.4 MG/DL (ref 8.6–10.6)
CHLORIDE SERPL-SCNC: 98 MMOL/L (ref 98–107)
CO2 SERPL-SCNC: 31 MMOL/L (ref 21–32)
CREAT SERPL-MCNC: 0.77 MG/DL (ref 0.5–1.05)
EGFRCR SERPLBLD CKD-EPI 2021: 83 ML/MIN/1.73M*2
ERYTHROCYTE [DISTWIDTH] IN BLOOD BY AUTOMATED COUNT: 14.3 % (ref 11.5–14.5)
GLUCOSE SERPL-MCNC: 88 MG/DL (ref 74–99)
HCT VFR BLD AUTO: 33.1 % (ref 36–46)
HGB BLD-MCNC: 10.3 G/DL (ref 12–16)
INR PPP: 1.6 (ref 0.9–1.1)
MAGNESIUM SERPL-MCNC: 1.97 MG/DL (ref 1.6–2.4)
MCH RBC QN AUTO: 30.2 PG (ref 26–34)
MCHC RBC AUTO-ENTMCNC: 31.1 G/DL (ref 32–36)
MCV RBC AUTO: 97 FL (ref 80–100)
NRBC BLD-RTO: 0 /100 WBCS (ref 0–0)
PLATELET # BLD AUTO: 172 X10*3/UL (ref 150–450)
POTASSIUM SERPL-SCNC: 4.5 MMOL/L (ref 3.5–5.3)
PROTHROMBIN TIME: 18.2 SECONDS (ref 9.8–12.8)
RBC # BLD AUTO: 3.41 X10*6/UL (ref 4–5.2)
SODIUM SERPL-SCNC: 139 MMOL/L (ref 136–145)
WBC # BLD AUTO: 7.5 X10*3/UL (ref 4.4–11.3)

## 2024-01-20 PROCEDURE — 83735 ASSAY OF MAGNESIUM: CPT | Performed by: STUDENT IN AN ORGANIZED HEALTH CARE EDUCATION/TRAINING PROGRAM

## 2024-01-20 PROCEDURE — 80048 BASIC METABOLIC PNL TOTAL CA: CPT | Performed by: STUDENT IN AN ORGANIZED HEALTH CARE EDUCATION/TRAINING PROGRAM

## 2024-01-20 PROCEDURE — 2500000001 HC RX 250 WO HCPCS SELF ADMINISTERED DRUGS (ALT 637 FOR MEDICARE OP): Performed by: STUDENT IN AN ORGANIZED HEALTH CARE EDUCATION/TRAINING PROGRAM

## 2024-01-20 PROCEDURE — 2500000004 HC RX 250 GENERAL PHARMACY W/ HCPCS (ALT 636 FOR OP/ED): Performed by: STUDENT IN AN ORGANIZED HEALTH CARE EDUCATION/TRAINING PROGRAM

## 2024-01-20 PROCEDURE — 36415 COLL VENOUS BLD VENIPUNCTURE: CPT | Performed by: STUDENT IN AN ORGANIZED HEALTH CARE EDUCATION/TRAINING PROGRAM

## 2024-01-20 PROCEDURE — 2500000004 HC RX 250 GENERAL PHARMACY W/ HCPCS (ALT 636 FOR OP/ED): Performed by: NURSE PRACTITIONER

## 2024-01-20 PROCEDURE — 85027 COMPLETE CBC AUTOMATED: CPT | Performed by: STUDENT IN AN ORGANIZED HEALTH CARE EDUCATION/TRAINING PROGRAM

## 2024-01-20 PROCEDURE — 1100000001 HC PRIVATE ROOM DAILY

## 2024-01-20 PROCEDURE — 85610 PROTHROMBIN TIME: CPT | Performed by: STUDENT IN AN ORGANIZED HEALTH CARE EDUCATION/TRAINING PROGRAM

## 2024-01-20 RX ORDER — MAGNESIUM SULFATE HEPTAHYDRATE 40 MG/ML
2 INJECTION, SOLUTION INTRAVENOUS ONCE
Status: DISCONTINUED | OUTPATIENT
Start: 2024-01-20 | End: 2024-01-20

## 2024-01-20 RX ORDER — METOPROLOL TARTRATE 25 MG/1
37.5 TABLET, FILM COATED ORAL EVERY 6 HOURS
Status: DISCONTINUED | OUTPATIENT
Start: 2024-01-20 | End: 2024-01-21 | Stop reason: HOSPADM

## 2024-01-20 RX ADMIN — OXYBUTYNIN CHLORIDE 5 MG: 5 TABLET ORAL at 14:31

## 2024-01-20 RX ADMIN — ACETAMINOPHEN 650 MG: 325 TABLET ORAL at 05:30

## 2024-01-20 RX ADMIN — ENOXAPARIN SODIUM 150 MG: 150 INJECTION SUBCUTANEOUS at 09:22

## 2024-01-20 RX ADMIN — AMITRIPTYLINE HYDROCHLORIDE 25 MG: 25 TABLET, FILM COATED ORAL at 21:12

## 2024-01-20 RX ADMIN — METOPROLOL TARTRATE 37.5 MG: 25 TABLET, FILM COATED ORAL at 16:54

## 2024-01-20 RX ADMIN — ACETAMINOPHEN 650 MG: 325 TABLET ORAL at 16:54

## 2024-01-20 RX ADMIN — FLUTICASONE FUROATE AND VILANTEROL TRIFENATATE 1 PUFF: 200; 25 POWDER RESPIRATORY (INHALATION) at 07:00

## 2024-01-20 RX ADMIN — ACETAMINOPHEN 650 MG: 325 TABLET ORAL at 12:32

## 2024-01-20 RX ADMIN — ATORVASTATIN CALCIUM 40 MG: 40 TABLET, FILM COATED ORAL at 21:12

## 2024-01-20 RX ADMIN — PANTOPRAZOLE SODIUM 40 MG: 40 TABLET, DELAYED RELEASE ORAL at 09:22

## 2024-01-20 RX ADMIN — ACETAMINOPHEN 650 MG: 325 TABLET ORAL at 21:12

## 2024-01-20 RX ADMIN — ASPIRIN 81 MG: 81 TABLET, COATED ORAL at 09:23

## 2024-01-20 RX ADMIN — OXYBUTYNIN CHLORIDE 5 MG: 5 TABLET ORAL at 21:12

## 2024-01-20 RX ADMIN — METOPROLOL TARTRATE 37.5 MG: 25 TABLET, FILM COATED ORAL at 21:11

## 2024-01-20 RX ADMIN — METOPROLOL TARTRATE 25 MG: 25 TABLET, FILM COATED ORAL at 05:30

## 2024-01-20 RX ADMIN — ENOXAPARIN SODIUM 150 MG: 150 INJECTION SUBCUTANEOUS at 21:11

## 2024-01-20 RX ADMIN — OXYBUTYNIN CHLORIDE 5 MG: 5 TABLET ORAL at 09:22

## 2024-01-20 RX ADMIN — LEVOTHYROXINE SODIUM 50 MCG: 50 TABLET ORAL at 05:37

## 2024-01-20 RX ADMIN — FUROSEMIDE 40 MG: 20 TABLET ORAL at 09:22

## 2024-01-20 RX ADMIN — WARFARIN SODIUM 7.5 MG: 5 TABLET ORAL at 18:26

## 2024-01-20 RX ADMIN — Medication 5000 UNITS: at 09:25

## 2024-01-20 RX ADMIN — FERROUS GLUCONATE 324 MG: 324 TABLET ORAL at 09:22

## 2024-01-20 RX ADMIN — LEVOTHYROXINE SODIUM 200 MCG: 100 TABLET ORAL at 05:36

## 2024-01-20 RX ADMIN — ISOSORBIDE MONONITRATE 120 MG: 30 TABLET, EXTENDED RELEASE ORAL at 09:22

## 2024-01-20 RX ADMIN — MECLIZINE HYDROCHLORIDE 25 MG: 25 TABLET ORAL at 21:12

## 2024-01-20 ASSESSMENT — COGNITIVE AND FUNCTIONAL STATUS - GENERAL
CLIMB 3 TO 5 STEPS WITH RAILING: A LITTLE
MOBILITY SCORE: 22
DRESSING REGULAR UPPER BODY CLOTHING: A LITTLE
CLIMB 3 TO 5 STEPS WITH RAILING: A LITTLE
WALKING IN HOSPITAL ROOM: A LITTLE
TOILETING: A LITTLE
DRESSING REGULAR LOWER BODY CLOTHING: A LITTLE
HELP NEEDED FOR BATHING: A LITTLE
MOBILITY SCORE: 22
DAILY ACTIVITIY SCORE: 20
WALKING IN HOSPITAL ROOM: A LITTLE

## 2024-01-20 ASSESSMENT — PAIN SCALES - GENERAL
PAINLEVEL_OUTOF10: 5 - MODERATE PAIN
PAINLEVEL_OUTOF10: 0 - NO PAIN
PAINLEVEL_OUTOF10: 0 - NO PAIN
PAINLEVEL_OUTOF10: 4

## 2024-01-20 ASSESSMENT — PAIN - FUNCTIONAL ASSESSMENT: PAIN_FUNCTIONAL_ASSESSMENT: 0-10

## 2024-01-20 NOTE — PROGRESS NOTES
VASCULAR SURGERY PROGRESS NOTE  Subjective   No major events overnight  Still on 4-5L.   HR up and down, 80-130s  No dyspnea, chest pain    Objective   Vitals:    01/20/24 0751   BP: 148/78   Pulse: 98   Resp: 18   Temp: 36.5 °C (97.7 °F)   SpO2: 93%      Exam:  Constitutional: No acute distress, resting comfortably  Neuro:  AOx3, grossly intact  ENMT: moist mucous membranes  CV: afib on monitor, HR 120s  Pulm: non-labored on room air  GI: soft, non-tender, non-distended, non-tender aorta.   Skin: warm and dry  Musculoskeletal: moving all extremities  Extremities: chronic venous insufficiency noted with hemosiderin staining. Bilateral groin access site c/d/i. Ecchymosis of abdomen (lovenox shots)   Pulses: palpable DP pulse bilaterally     Relevant Results  Medications:  Scheduled Meds:acetaminophen, 650 mg, oral, q4h  amitriptyline, 25 mg, oral, Nightly  aspirin, 81 mg, oral, Daily  atorvastatin, 40 mg, oral, Daily  cholecalciferol, 5,000 Units, oral, Daily  enoxaparin, 1 mg/kg, subcutaneous, BID  ferrous gluconate, 324 mg, oral, Daily  fluticasone furoate-vilanteroL, 1 puff, inhalation, Daily  furosemide, 40 mg, oral, Daily  [Held by provider] hydrALAZINE, 100 mg, oral, TID  isosorbide mononitrate ER, 120 mg, oral, Daily  levothyroxine, 200 mcg, oral, Daily  levothyroxine, 50 mcg, oral, Daily  [Held by provider] losartan, 100 mg, oral, Daily  magnesium sulfate, 2 g, intravenous, Once  magnesium sulfate, 2 g, intravenous, Once  meclizine, 25 mg, oral, Nightly  metoprolol tartrate, 37.5 mg, oral, q6h  oxybutynin, 5 mg, oral, TID  pantoprazole, 40 mg, oral, Daily  perflutren protein A microsphere, 0.5 mL, intravenous, Once in imaging  polyethylene glycol, 17 g, oral, Daily  potassium chloride CR, 40 mEq, oral, Once  sulfur hexafluoride microsphr, 2 mL, intravenous, Once in imaging  warfarin, 7.5 mg, oral, Daily      Continuous Infusions:   PRN Meds:.PRN medications: heparin (porcine) 5,000 Units in sodium chloride  0.9% 500 mL irrigation, iodixanol, naloxone, ondansetron, oxyCODONE, sodium chloride    Labs:  Results from last 7 days   Lab Units 01/19/24  1123 01/18/24  0131   WBC AUTO x10*3/uL 6.0 6.3   HEMOGLOBIN g/dL 9.2* 8.1*   PLATELETS AUTO x10*3/uL 130* 136*      Results from last 7 days   Lab Units 01/20/24  0841 01/19/24  1123 01/18/24  1050   SODIUM mmol/L 139 138 140   POTASSIUM mmol/L 4.5 3.5 3.9   CHLORIDE mmol/L 98 98 101   CO2 mmol/L 31 34* 34*   BUN mg/dL 15 16 17   CREATININE mg/dL 0.77 0.80 0.86   GLUCOSE mg/dL 88 115* 113*   MAGNESIUM mg/dL 1.97 1.62  --       Results from last 7 days   Lab Units 01/20/24  0841 01/19/24  1123 01/18/24  0131   INR  1.6* 1.4* 1.4*   PROTIME seconds 18.2* 16.2* 15.6*   APTT seconds  --   --  40*     Assessment/Plan   67 yo F with PMH of afib (s/p ablation, on warfarin), COPD (on 2L continuous oxygen), morbid obesity, CVI, HTN, hypothyroidism and AAA (s/p EVAR 2016) now s/p right renal artery stenting (6x59m [place] VBX), aortic stent graft placement ([id number] [place] aortic cuff x2), bilateral iliac stent graft placement, and right hypogastric artery coil embolization on 1/17/2024 by Dr. Walton and Dr. Hernandez. Overnight on POD0/POD1 she developed afib RVR with hypotension requiring multiple IV metoprolol pushes and fluid boluses. Electrolytes were repleted.     1/19: NC at 5-6L, suspect combination edema and CPAP noncompliance. HR ranging 90-120s with metoprolol at 25mg BID. May require up-titration metoprolol  1/20: Increase metoprolol to 37.5mg q6hr. Restarted home PO lasix. Needs further RT    Plan:  Neuro: acute postoperative pain   - continue tylenol/oxy PRN for pain control  - continue neurovascular checks every 4 hrs  - continue home amitriptyline     CV: HTN, persistent afib (warfarin), CVI, AAA, hypotension   - maintain blood pressure control  - continue home isosorbide, home coreg switched to metoprolol (25mg ->37.5mg q6hr today. 40mg PO lasix today.   - continue  atorvastatin/ASA  - home warfarin resumed 1/18, briding with lovenox  - Cardiology involved for Afib with RVR, titrating metoprolol    Pulm: h/o COPD on 2L home O2, chronic respiratory failure   - continue to encourage IS hourly while awake  - OOB to chair and increase ambulation as tolerated  - continue home inhalers   - continue home meclizien   - Ongoing RT, nightly CPAP    FENGI: GERD, urinary incontinence, hypokalemia, hypomagnesemia  - continue regular diet with boost supplements  - continue PPI  - strict I&O  - continue bowel regimen to prevent OIC   - continue home oxybutynin   - voiding after mullen removal  - replete electrolytes as needed to maintain K>4, Mg>2   - RFP as clinically indicated    Endo: hypothyroidism   - continue home synthroid     Heme: iron deficiency anemia, acute blood loss anemia   - no s/sx of bleeding  - continue home iron supplement  - CBC as clinically indicated  - daily INR     ID: urinary incontinence   - trend temp q4  - CBC as clinically indicated    Dispo:   - continue care on regular nursing floor  - discharge home pending reduction in O2 requirements    Discussed with Dr. Stalin Obrien MD

## 2024-01-21 VITALS
DIASTOLIC BLOOD PRESSURE: 76 MMHG | OXYGEN SATURATION: 94 % | TEMPERATURE: 97.7 F | RESPIRATION RATE: 18 BRPM | SYSTOLIC BLOOD PRESSURE: 140 MMHG | HEIGHT: 67 IN | HEART RATE: 96 BPM | WEIGHT: 293 LBS | BODY MASS INDEX: 45.99 KG/M2

## 2024-01-21 LAB
ALBUMIN SERPL BCP-MCNC: 3.4 G/DL (ref 3.4–5)
ANION GAP SERPL CALC-SCNC: 12 MMOL/L (ref 10–20)
BUN SERPL-MCNC: 14 MG/DL (ref 6–23)
CALCIUM SERPL-MCNC: 10.2 MG/DL (ref 8.6–10.6)
CHLORIDE SERPL-SCNC: 98 MMOL/L (ref 98–107)
CO2 SERPL-SCNC: 35 MMOL/L (ref 21–32)
CREAT SERPL-MCNC: 0.75 MG/DL (ref 0.5–1.05)
EGFRCR SERPLBLD CKD-EPI 2021: 86 ML/MIN/1.73M*2
ERYTHROCYTE [DISTWIDTH] IN BLOOD BY AUTOMATED COUNT: 14.5 % (ref 11.5–14.5)
GLUCOSE SERPL-MCNC: 89 MG/DL (ref 74–99)
HCT VFR BLD AUTO: 30.5 % (ref 36–46)
HGB BLD-MCNC: 9.5 G/DL (ref 12–16)
INR PPP: 1.9 (ref 0.9–1.1)
MAGNESIUM SERPL-MCNC: 1.6 MG/DL (ref 1.6–2.4)
MCH RBC QN AUTO: 30.2 PG (ref 26–34)
MCHC RBC AUTO-ENTMCNC: 31.1 G/DL (ref 32–36)
MCV RBC AUTO: 97 FL (ref 80–100)
NRBC BLD-RTO: 0 /100 WBCS (ref 0–0)
PHOSPHATE SERPL-MCNC: 3.2 MG/DL (ref 2.5–4.9)
PLATELET # BLD AUTO: 132 X10*3/UL (ref 150–450)
POTASSIUM SERPL-SCNC: 3.4 MMOL/L (ref 3.5–5.3)
PROTHROMBIN TIME: 21.3 SECONDS (ref 9.8–12.8)
RBC # BLD AUTO: 3.15 X10*6/UL (ref 4–5.2)
SODIUM SERPL-SCNC: 142 MMOL/L (ref 136–145)
WBC # BLD AUTO: 5.2 X10*3/UL (ref 4.4–11.3)

## 2024-01-21 PROCEDURE — 2500000004 HC RX 250 GENERAL PHARMACY W/ HCPCS (ALT 636 FOR OP/ED): Performed by: NURSE PRACTITIONER

## 2024-01-21 PROCEDURE — 2500000001 HC RX 250 WO HCPCS SELF ADMINISTERED DRUGS (ALT 637 FOR MEDICARE OP): Performed by: STUDENT IN AN ORGANIZED HEALTH CARE EDUCATION/TRAINING PROGRAM

## 2024-01-21 PROCEDURE — 83735 ASSAY OF MAGNESIUM: CPT | Performed by: STUDENT IN AN ORGANIZED HEALTH CARE EDUCATION/TRAINING PROGRAM

## 2024-01-21 PROCEDURE — 36415 COLL VENOUS BLD VENIPUNCTURE: CPT | Performed by: STUDENT IN AN ORGANIZED HEALTH CARE EDUCATION/TRAINING PROGRAM

## 2024-01-21 PROCEDURE — 85027 COMPLETE CBC AUTOMATED: CPT

## 2024-01-21 PROCEDURE — 2500000004 HC RX 250 GENERAL PHARMACY W/ HCPCS (ALT 636 FOR OP/ED): Performed by: STUDENT IN AN ORGANIZED HEALTH CARE EDUCATION/TRAINING PROGRAM

## 2024-01-21 PROCEDURE — 85610 PROTHROMBIN TIME: CPT | Performed by: STUDENT IN AN ORGANIZED HEALTH CARE EDUCATION/TRAINING PROGRAM

## 2024-01-21 PROCEDURE — 80069 RENAL FUNCTION PANEL: CPT

## 2024-01-21 RX ORDER — METOPROLOL TARTRATE 37.5 MG/1
37.5 TABLET, FILM COATED ORAL EVERY 6 HOURS
Qty: 30 TABLET | Refills: 3 | Status: SHIPPED | OUTPATIENT
Start: 2024-01-21 | End: 2024-01-25 | Stop reason: ALTCHOICE

## 2024-01-21 RX ORDER — LANOLIN ALCOHOL/MO/W.PET/CERES
400 CREAM (GRAM) TOPICAL DAILY
Status: DISCONTINUED | OUTPATIENT
Start: 2024-01-21 | End: 2024-01-21 | Stop reason: HOSPADM

## 2024-01-21 RX ORDER — POTASSIUM CHLORIDE 20 MEQ/1
40 TABLET, EXTENDED RELEASE ORAL ONCE
Status: COMPLETED | OUTPATIENT
Start: 2024-01-21 | End: 2024-01-21

## 2024-01-21 RX ADMIN — ISOSORBIDE MONONITRATE 120 MG: 30 TABLET, EXTENDED RELEASE ORAL at 08:42

## 2024-01-21 RX ADMIN — LEVOTHYROXINE SODIUM 200 MCG: 100 TABLET ORAL at 05:30

## 2024-01-21 RX ADMIN — METOPROLOL TARTRATE 37.5 MG: 25 TABLET, FILM COATED ORAL at 05:30

## 2024-01-21 RX ADMIN — ACETAMINOPHEN 650 MG: 325 TABLET ORAL at 12:51

## 2024-01-21 RX ADMIN — ASPIRIN 81 MG: 81 TABLET, COATED ORAL at 08:42

## 2024-01-21 RX ADMIN — FERROUS GLUCONATE 324 MG: 324 TABLET ORAL at 08:42

## 2024-01-21 RX ADMIN — OXYBUTYNIN CHLORIDE 5 MG: 5 TABLET ORAL at 08:42

## 2024-01-21 RX ADMIN — Medication 400 MG: at 12:51

## 2024-01-21 RX ADMIN — POTASSIUM CHLORIDE 40 MEQ: 1500 TABLET, EXTENDED RELEASE ORAL at 12:51

## 2024-01-21 RX ADMIN — METOPROLOL TARTRATE 37.5 MG: 25 TABLET, FILM COATED ORAL at 10:50

## 2024-01-21 RX ADMIN — LEVOTHYROXINE SODIUM 50 MCG: 50 TABLET ORAL at 06:00

## 2024-01-21 RX ADMIN — PANTOPRAZOLE SODIUM 40 MG: 40 TABLET, DELAYED RELEASE ORAL at 09:00

## 2024-01-21 RX ADMIN — ENOXAPARIN SODIUM 150 MG: 150 INJECTION SUBCUTANEOUS at 08:43

## 2024-01-21 RX ADMIN — FUROSEMIDE 40 MG: 20 TABLET ORAL at 08:42

## 2024-01-21 RX ADMIN — ACETAMINOPHEN 650 MG: 325 TABLET ORAL at 05:30

## 2024-01-21 RX ADMIN — FLUTICASONE FUROATE AND VILANTEROL TRIFENATATE 1 PUFF: 200; 25 POWDER RESPIRATORY (INHALATION) at 09:21

## 2024-01-21 RX ADMIN — Medication 5000 UNITS: at 08:42

## 2024-01-21 ASSESSMENT — PAIN SCALES - GENERAL
PAINLEVEL_OUTOF10: 0 - NO PAIN
PAINLEVEL_OUTOF10: 0 - NO PAIN

## 2024-01-21 NOTE — PROGRESS NOTES
Emma Villela is a 69 y.o. female on day 4 of admission presenting with Type I endoleak of aortic graft (CMS/[facility]).    Called pt in her room- [phone] to verify home O2 agency @ 0949am.  Per pt, she uses [facility].  Family will be providing O2 tank for transport.      0957 Called [facility] and spoke to Zackery to notify increase in O2 liter flow to 4L.  Zackery stated he will notify the on call person to exchange concentrator.  Also provided Zackery with pt's hospital room phone so on call can speak to her directly.    Nola Yung RN [facility] weekend  epic messenger

## 2024-01-21 NOTE — DISCHARGE SUMMARY
Discharge Diagnosis  Type I endoleak of aortic graft (CMS/HCC)    Issues Requiring Follow-Up  Follow up with PCP  Follow up with Dr. Walton  Switching Carvedilol to Metoprolol    Test Results Pending At Discharge  Pending Labs       No current pending labs.            Hospital Course  67 yo F with PMH of afib (s/p ablation, on warfarin), COPD (on 2L continuous oxygen), morbid obesity, CVI, HTN, hypothyroidism and AAA (s/p EVAR 2016) now s/p right renal artery stenting (6x59m [place] VBX), aortic stent graft placement ([id number] [place] aortic cuff x2), bilateral iliac stent graft placement, and right hypogastric artery coil embolization on 1/17/2024 by Dr. Walton and Dr. Hernandez. Overnight on POD0/POD1 she developed afib RVR with hypotension requiring multiple IV metoprolol pushes and fluid boluses. Electrolytes were repleted.      1/19: NC at 5-6L, suspect combination edema and CPAP noncompliance. HR ranging 90-120s with metoprolol at 25mg BID. May require up-titration metoprolol  1/20: Increase metoprolol to 37.5mg q6hr. Restarted home PO lasix. Needs further RT  1/21: Did well. HR in low 90s. Medically appropriate for discharge home today.    Pertinent Physical Exam At Time of Discharge  Physical Exam  Constitutional: No acute distress, resting comfortably  Neuro:  AOx3, grossly intact  ENMT: moist mucous membranes  CV: afib on monitor, HR 120s  Pulm: non-labored on room air  GI: soft, non-tender, non-distended, non-tender aorta.   Skin: warm and dry  Musculoskeletal: moving all extremities  Extremities: chronic venous insufficiency noted with hemosiderin staining. Bilateral groin access site c/d/i. Ecchymosis of abdomen (lovenox shots)   Pulses: palpable DP pulse bilaterally     Home Medications     Medication List      START taking these medications     metoprolol tartrate 37.5 mg tablet; Commonly known as: Lopressor; Take 1   tablet (37.5 mg) by mouth every 6 hours.     CONTINUE taking these medications      acetaminophen 325 mg tablet; Commonly known as: Tylenol   albuterol 90 mcg/actuation inhaler   amitriptyline 25 mg tablet; Commonly known as: Elavil; TAKE ONE TABLET   BY MOUTH AT BEDTIME   aspirin 81 mg EC tablet   atorvastatin 40 mg tablet; Commonly known as: Lipitor; TAKE ONE TABLET   BY MOUTH DAILY   budesonide-formoteroL 160-4.5 mcg/actuation inhaler; Commonly known as:   Symbicort   chlorhexidine 0.12 % solution; Commonly known as: Peridex; Use 15 mL in   the mouth or throat see administration instructions for 2 doses. Use the   night before and morning of surgery.   cholecalciferol 125 MCG (5000 UT) capsule; Commonly known as: Vitamin   D-3; Take 1 capsule (125 mcg) by mouth once daily.   ferrous gluconate 324 (38 Fe) mg tablet; Commonly known as: Fergon; TAKE   ONE TABLET BY MOUTH DAILY   furosemide 40 mg tablet; Commonly known as: Lasix; TAKE ONE TABLET BY   MOUTH TWO TIMES A DAY   hydrALAZINE 100 mg tablet; Commonly known as: Apresoline; TAKE ONE   TABLET BY MOUTH THREE TIMES A DAY   Jantoven 7.5 mg tablet; Generic drug: warfarin; Take as directed. If you   are unsure how to take this medication, talk to your nurse or doctor.;   Original instructions: TAKE ONE TABLET BY MOUTH EVERY DAY   * levothyroxine 200 mcg tablet; Commonly known as: Synthroid, Levoxyl;   TAKE ONE TABLET BY MOUTH EVERY DAY ALONG WITH A 25 MCG TABLET FOR A TOTAL   DAILY DOSE  MCG   * levothyroxine 50 mcg tablet; Commonly known as: Synthroid, Levoxyl;   TAKE 1 TABLET BY MOUTH EVERY DAY ALONG WITH  MCG TABLET TO EQUAL A   TOTAL DAILY DOSE  MCG.   losartan 100 mg tablet; Commonly known as: Cozaar; TAKE ONE TABLET BY   MOUTH EVERY DAY   magnesium oxide 400 mg (241.3 mg magnesium) tablet; Commonly known as:   Mag-Ox   meclizine 25 mg tablet; Commonly known as: Antivert; TAKE ONE TABLET BY   MOUTH AT BEDTIME   metOLazone 5 mg tablet; Commonly known as: Zaroxolyn   oxybutynin XL 15 mg 24 hr tablet; Commonly known as:  Ditropan-XL   pantoprazole 40 mg EC tablet; Commonly known as: ProtoNix; TAKE ONE   TABLET BY MOUTH EVERY DAY  * This list has 2 medication(s) that are the same as other medications   prescribed for you. Read the directions carefully, and ask your doctor or   other care provider to review them with you.     STOP taking these medications     carvedilol 25 mg tablet; Commonly known as: Coreg   enoxaparin 150 mg/mL injection; Commonly known as: Lovenox     ASK your doctor about these medications     isosorbide mononitrate  mg 24 hr tablet; Commonly known as: Imdur;   TAKE ONE TABLET BY MOUTH once DAILY       Outpatient Follow-Up  Future Appointments   Date Time Provider Department Center   1/29/2024 10:00 AM Ti Nolan MD [id number] [facility]   2/21/2024  9:30 AM [facility]   2/21/2024 10:45 AM Sj Walton MD [id number]   2/26/2024  9:30 AM Ericka Corado APRN-CNP [id number] [facility]     Wilfred Iqbal MD  Vascular Surgery, PGY3  Team Pager: [phone]

## 2024-01-21 NOTE — DISCHARGE INSTRUCTIONS
For mild pain, you may take Tylenol as directed.    Please follow up with your primary care provider within 2 weeks of your discharge from the hospital.    You may resume your normal diet.    You may resume your normal physical activities without restriction.    You have a follow up appointment with Dr. Walton on 2/21/2024. Prior to your appointment, you will need to have another CT scan, of which will be scheduled for you.     We are discontinuing your Carvedilol and starting you on Metoprolol. We have sent you a script for the Metoprolol to your [facility] pharmacy.    If you develop any new or worsening symptoms, please call the the clinic.    Please call the clinic with any questions or concerns.

## 2024-01-22 ENCOUNTER — PATIENT OUTREACH (OUTPATIENT)
Dept: CARE COORDINATION | Facility: CLINIC | Age: 69
End: 2024-01-22
Payer: MEDICARE

## 2024-01-22 DIAGNOSIS — E87.6 HYPOKALEMIA: ICD-10-CM

## 2024-01-22 LAB
ATRIAL RATE: 107 BPM
Q ONSET: 212 MS
QRS COUNT: 22 BEATS
QRS DURATION: 102 MS
QT INTERVAL: 322 MS
QTC CALCULATION(BAZETT): 475 MS
QTC FREDERICIA: 418 MS
R AXIS: 34 DEGREES
T AXIS: 117 DEGREES
T OFFSET: 373 MS
VENTRICULAR RATE: 131 BPM

## 2024-01-22 RX ORDER — POTASSIUM CHLORIDE 1500 MG/1
20 TABLET, EXTENDED RELEASE ORAL EVERY 12 HOURS
Qty: 60 TABLET | Refills: 0 | Status: SHIPPED | OUTPATIENT
Start: 2024-01-22 | End: 2024-02-20

## 2024-01-22 SDOH — SOCIAL STABILITY: SOCIAL NETWORK: ARE YOU MARRIED, WIDOWED, DIVORCED, SEPARATED, NEVER MARRIED, OR LIVING WITH A PARTNER?: MARRIED

## 2024-01-22 SDOH — ECONOMIC STABILITY: TRANSPORTATION INSECURITY
IN THE PAST 12 MONTHS, HAS THE LACK OF TRANSPORTATION KEPT YOU FROM MEDICAL APPOINTMENTS OR FROM GETTING MEDICATIONS?: NO

## 2024-01-22 SDOH — ECONOMIC STABILITY: GENERAL: WOULD YOU LIKE HELP WITH ANY OF THE FOLLOWING NEEDS?: I DONT NEED HELP WITH ANY OF THESE

## 2024-01-22 NOTE — OP NOTE
Operative Note     Date: 24  Name: Emma Villela   : 1955  MRN: [id number]     Diagnosis  Type Ia, Type Ib endoleak     Procedures  Ultrasound guided access bilateral CFA for delivery of endoprostheses  Proximal extension of prior EVAR using 32mm cuff up to the left renal artery (snorkel right renal)  Distal extension of bilateral iliac limbs and right internal iliac artery embolization    Surgeon      * Sj Walton - Primary    Resident/Fellow/Other Assistant:  Micah    Procedure Summary  Anesthesia: GETA   Estimated Blood Loss: minimal    Specimen: No specimens collected       Findings: small residual 1a endoleak vs gutter leak, resolution of 1b leaks    Indications: Emma Villela is an 69 y.o.  y.o. female  who presents with prior EVAR, and open ligation for type II endoleak. Recent scan shows growth of her anuerysm and obvious 1a endoleak and 1b leak from bilateral iliac limbs. Due to the need for a snorkel procedure to the right renal artery, which needs to be done simultaneous to the deployment of the proximal cuff, two attendings were required to safely perform these simultaneous procedures. Dr. Hernandez performed the right renal artery snorkel/stenting procedure while I placed the proximal extension.    Procedure Details: The patient was brought to the hybrid OR and placed supine on the table. GETA was induced. The groins and left arm were prepped and draped in sterile fashion. I used ultrasound guidance to percutaneously access bilateral CFA. Pre-close technique was used to deploy 2 prostyle devices in each CFA access and then 9 Fr sheaths were placed. On the right side, we upsized to 18Fr sheath. On the left side, a 12 fr sheath was placed. Dr. Hernandez went through this left side sheath to place the right renal sheath and VBX stent. I used the 18Fr access on the right to advance the proximal cuff up to the level of the left renal artery. The stents were delpoyed and balloon molded  simultaneously. We placed a second extension to get closer to the left renal while protecting the right renal snorkel stent and ballooned both again simultaneously. An angiogram confirmed good flow in the right renal artery stent and good apposition of the proximal cuffs, but a small gutter leak. At this time I turned my attention to the Ib leak and Dr. Hernandez left the case. I used a catheter and wire to access the right hypogastric artery and coiled this using penumbra coils. I then extended the right iliac limb using 14mm limbs into the right external iliac artery and balloon molded this using mob balloon. On the left a 23mm limb was used to extend down to the left iliac bifurcation, preserving the internal iliac artery on the left. The limb and overlap sites were balloon molded. A final angiogram showed good seal distally. Small proximal gutter leak remained present. Protamine was administered and devices were pulled. Prostyles were tied down for hemostasis. The patient was taken to the ICU in stable condition.      Attending Attestation: I was present and scrubbed for the entire procedure.    Sj Walton

## 2024-01-22 NOTE — PROGRESS NOTES
Returned voice mail message to spouse regarding bp 74/48 with pulse 98 and patient feeling very lightheaded.   Spoke to patient who states she had taken Isosorbide this morning with the Metoprolol and was not aware to hold Isosorbide until spoke with this writer earlier today.     Educated patient to hold next dose of Metoprolol and contact PCP for further direction.     Patient and spouse voiced understanding.     Danuta GONZALEZ RN CCM  RN Care Coordinator  [facility]  Phone [phone]

## 2024-01-22 NOTE — PROGRESS NOTES
Spouse returns call with question about timing of taking Metoprolol.  Provided educated for such.    Engagement  Call Start Time: 1110 (1/22/2024 11:10 AM)    Medications  Medications reviewed with patient/caregiver?: Yes (1/22/2024 11:10 AM)  Is the patient having any side effects they believe may be caused by any medication additions or changes?: (!) Yes (dizziness   Educated spouse to discuss with PCP) (1/22/2024 11:10 AM)  Does the patient have all medications ordered at discharge?: Yes (1/22/2024 11:10 AM)  Care Management Interventions: Provided patient education; Advised patient to call provider (Educated spouse for patient not to have sudden position changes, drink plenty of fluids, remove throw rugs, and have well it environment.   Educated spouse to check bp twice daily and take bp log to PCP appt.) (1/22/2024 11:10 AM)  Is the patient taking all medications as directed (includes completed medication regime)?: Yes (1/22/2024 11:10 AM)    Appointments  Does the patient have a primary care provider?: Yes (1/22/2024 11:10 AM)  Care Management Interventions: Verified appointment date/time/provider (1/22/2024 11:10 AM)  Has the patient kept scheduled appointments due by today?: Yes (1/22/2024 11:10 AM)    Patient Teaching  Does the patient have access to their discharge instructions?: Yes (1/22/2024 11:10 AM)  Care Management Interventions: Reviewed instructions with patient (1/22/2024 11:10 AM)  What is the patient's perception of their health status since discharge?: Improving (1/22/2024 11:10 AM)  Is the patient/caregiver able to teach back the hierarchy of who to call/visit for symptoms/problems? PCP, Specialist, Home Health nurse, Urgent Care, ED, 911: Yes (1/22/2024 11:10 AM)    Wrap Up  Call End Time: 1112 (1/22/2024 11:10 AM)      Will follow up next week after PCP appt.    Danuta GONZALEZ RN CCM  RN Care Coordinator  [facility]  Phone [phone]

## 2024-01-22 NOTE — PROGRESS NOTES
lAdmit to [facility] 1/17 to 1/21 for scheduled endoleak repair of aortic graft with Dr Hernandez and Dr Walton.  Carvedilol changed to Metoprolol  Appts with PCP 1/29, CT angiogram abdomen/pelvis 2/1, vascular surgery 2/21, and pulmonology 2/26   PMH: atrial fib AAA heart failure  HTN HLD pulmonary HTN O2 dependent COPD   Lives with spouse    Attempted transition of care outreach; left voice mail message.  Enrolled in [employer] chat to monitor for 30 day transition period and will outreach if issues arise.    Danuta GONZALEZ RN CCM  RN Care Coordinator  [facility]  Phone [phone]

## 2024-01-25 ENCOUNTER — ANTICOAGULATION - WARFARIN VISIT (OUTPATIENT)
Dept: PHARMACY | Facility: HOSPITAL | Age: 69
End: 2024-01-25

## 2024-01-25 ENCOUNTER — OFFICE VISIT (OUTPATIENT)
Dept: PRIMARY CARE | Facility: CLINIC | Age: 69
End: 2024-01-25
Payer: MEDICARE

## 2024-01-25 VITALS
SYSTOLIC BLOOD PRESSURE: 116 MMHG | OXYGEN SATURATION: 95 % | HEART RATE: 94 BPM | TEMPERATURE: 97.3 F | DIASTOLIC BLOOD PRESSURE: 90 MMHG

## 2024-01-25 DIAGNOSIS — E87.6 HYPOKALEMIA: ICD-10-CM

## 2024-01-25 DIAGNOSIS — T82.310A TYPE I ENDOLEAK OF AORTIC GRAFT (CMS-HCC): ICD-10-CM

## 2024-01-25 DIAGNOSIS — I10 BENIGN ESSENTIAL HYPERTENSION: ICD-10-CM

## 2024-01-25 DIAGNOSIS — I50.810 RIGHT-SIDED CONGESTIVE HEART FAILURE, UNSPECIFIED HF CHRONICITY (MULTI): ICD-10-CM

## 2024-01-25 DIAGNOSIS — I48.11 LONGSTANDING PERSISTENT ATRIAL FIBRILLATION (MULTI): ICD-10-CM

## 2024-01-25 DIAGNOSIS — J44.9 CHRONIC OBSTRUCTIVE PULMONARY DISEASE, UNSPECIFIED COPD TYPE (MULTI): ICD-10-CM

## 2024-01-25 DIAGNOSIS — E78.00 HYPERCHOLESTEROLEMIA: Primary | ICD-10-CM

## 2024-01-25 DIAGNOSIS — J96.11 CHRONIC RESPIRATORY FAILURE WITH HYPOXIA (MULTI): ICD-10-CM

## 2024-01-25 DIAGNOSIS — I48.91 ATRIAL FIBRILLATION, UNSPECIFIED TYPE (MULTI): Primary | ICD-10-CM

## 2024-01-25 DIAGNOSIS — E89.0 HYPOTHYROIDISM, POSTSURGICAL: ICD-10-CM

## 2024-01-25 PROCEDURE — 1159F MED LIST DOCD IN RCRD: CPT | Performed by: INTERNAL MEDICINE

## 2024-01-25 PROCEDURE — 3080F DIAST BP >= 90 MM HG: CPT | Performed by: INTERNAL MEDICINE

## 2024-01-25 PROCEDURE — 1111F DSCHRG MED/CURRENT MED MERGE: CPT | Performed by: INTERNAL MEDICINE

## 2024-01-25 PROCEDURE — 1036F TOBACCO NON-USER: CPT | Performed by: INTERNAL MEDICINE

## 2024-01-25 PROCEDURE — 3008F BODY MASS INDEX DOCD: CPT | Performed by: INTERNAL MEDICINE

## 2024-01-25 PROCEDURE — 3074F SYST BP LT 130 MM HG: CPT | Performed by: INTERNAL MEDICINE

## 2024-01-25 PROCEDURE — 1126F AMNT PAIN NOTED NONE PRSNT: CPT | Performed by: INTERNAL MEDICINE

## 2024-01-25 PROCEDURE — 1160F RVW MEDS BY RX/DR IN RCRD: CPT | Performed by: INTERNAL MEDICINE

## 2024-01-25 PROCEDURE — 99496 TRANSJ CARE MGMT HIGH F2F 7D: CPT | Performed by: INTERNAL MEDICINE

## 2024-01-25 PROCEDURE — 1157F ADVNC CARE PLAN IN RCRD: CPT | Performed by: INTERNAL MEDICINE

## 2024-01-25 RX ORDER — CARVEDILOL 25 MG/1
12.5 TABLET ORAL
Qty: 180 TABLET | Refills: 0 | Status: SHIPPED | OUTPATIENT
Start: 2024-01-25

## 2024-01-25 ASSESSMENT — ENCOUNTER SYMPTOMS
PALPITATIONS: 0
BRUISES/BLEEDS EASILY: 0
ARTHRALGIAS: 0
COUGH: 0
UNEXPECTED WEIGHT CHANGE: 0
DIARRHEA: 0
SINUS PAIN: 0
HEADACHES: 0
BLOOD IN STOOL: 0
WHEEZING: 0
DIZZINESS: 0
FATIGUE: 0
ABDOMINAL PAIN: 0
FEVER: 0
SORE THROAT: 0
DIFFICULTY URINATING: 0

## 2024-01-25 NOTE — PROGRESS NOTES
Subjective   Patient ID: Emma Villela is a 69 y.o. female who presents for Hospital Follow-up (TCM, discuss Metoprolol not feeling well ).    Transition of care  Patient admission history and physical, hospital course, medications, verified and reviewed  Patient contacted after discharge, medications verified comes today for follow-up  Patient with complex medical condition  Patient contacted after discharge medications and questions answered patient with high complexity questions about metoprolol unable to use it patient taking now only 37.5 mg twice a day comes today for reevaluation  Patient admission summary PMH of afib (s/p ablation, on warfarin), COPD (on 2L continuous oxygen), morbid obesity, CVI, HTN, hypothyroidism and AAA (s/p EVAR 2016) now s/p right renal artery stenting (6x59m [place] VBX), aortic stent graft placement ([id number] [place] aortic cuff x2), bilateral iliac stent graft placement, and right hypogastric artery coil embolization on 1/17/2024 by Dr. Walton and Dr. Hernandez. Overnight on POD0/POD1 she developed afib RVR with hypotension requiring multiple IV metoprolol pushes and fluid boluses. Electrolytes were repleted.  -Atrial fibrillation uncontrolled rhythm patient now taken Toprol 37.5 mg only every 12 hours not feeling well blood pressure record from home reviewed borderline low  Patient to switch back to carvedilol 12.5 mg twice a day titrate slowly reported for any blood pressure above 130/80 or heart rate above 80 follow-up progress closely  -Status post endoleak repair doing well no abdominal pain mild superficial bruising anterior abdominal wall groin incision healing follow-up surgery as a scheduled  - Atrial fibrillation patient seen by Coumadin clinic review 1 further follow-up INR closely  - Hypothyroid seen by endocrinology now takes total to 250 mcg's daily follow-up thyroid function test in 3 months  - Hypomagnesemia start a magnesium tablet daily  - Vitamin D deficiency need to  start on vitamin D 5000 daily  - Hypokalemia continue with potassium 40 mg twice a day follow-up BMP for further recommendation  -CHF compensated continue low-salt diet  --Chronic respiratory failure and COPD continues 2 L oxygen continue with current inhaler recent pulmonary function test no change  Reevaluate patient in 4 weeks             Review of Systems   Constitutional:  Negative for fatigue, fever and unexpected weight change.   HENT:  Negative for congestion, ear discharge, ear pain, mouth sores, sinus pain and sore throat.    Eyes:  Negative for visual disturbance.   Respiratory:  Negative for cough and wheezing.    Cardiovascular:  Negative for chest pain, palpitations and leg swelling.   Gastrointestinal:  Negative for abdominal pain, blood in stool and diarrhea.   Genitourinary:  Negative for difficulty urinating.   Musculoskeletal:  Negative for arthralgias.   Skin:  Negative for rash.   Neurological:  Negative for dizziness and headaches.   Hematological:  Does not bruise/bleed easily.   Psychiatric/Behavioral:  Negative for behavioral problems.    All other systems reviewed and are negative.      Objective   Lab Results   Component Value Date    HGBA1C 5.5 12/28/2023      /90   Pulse 94   Temp 36.3 °C (97.3 °F)   SpO2 95%     Physical Exam  Vitals and nursing note reviewed.   Constitutional:       Appearance: Normal appearance.   HENT:      Head: Normocephalic.      Nose: Nose normal.   Eyes:      Conjunctiva/sclera: Conjunctivae normal.      Pupils: Pupils are equal, round, and reactive to light.   Cardiovascular:      Rate and Rhythm: Regular rhythm.   Pulmonary:      Effort: Pulmonary effort is normal.      Breath sounds: Normal breath sounds.   Abdominal:      General: Abdomen is flat.      Palpations: Abdomen is soft.   Musculoskeletal:      Cervical back: Neck supple.   Skin:     General: Skin is warm.      Findings: Erythema (Lower abdominal wall bruising and upper thigh bruising)  present.   Neurological:      General: No focal deficit present.      Mental Status: She is oriented to person, place, and time.   Psychiatric:         Mood and Affect: Mood normal.         Assessment/Plan   Emma was seen today for hospital follow-up.  Diagnoses and all orders for this visit:  Hypercholesterolemia (Primary)  Benign essential hypertension  -     carvedilol (Coreg) 25 mg tablet; Take 0.5 tablets (12.5 mg) by mouth 2 times a day with meals. Take with food.  Type I endoleak of aortic graft (CMS/HCC)  Longstanding persistent atrial fibrillation (CMS/HCC)  Chronic respiratory failure with hypoxia (CMS/HCC)  Right-sided congestive heart failure, unspecified HF chronicity (CMS/HCC)  Chronic obstructive pulmonary disease, unspecified COPD type (CMS/HCC)  Hypokalemia  Hypothyroidism, postsurgical   Transition of care  Patient admission history and physical, hospital course, medications, verified and reviewed  Patient contacted after discharge, medications verified comes today for follow-up  Patient with complex medical condition  Patient contacted after discharge medications and questions answered patient with high complexity questions about metoprolol unable to use it patient taking now only 37.5 mg twice a day comes today for reevaluation  Patient admission summary PMH of afib (s/p ablation, on warfarin), COPD (on 2L continuous oxygen), morbid obesity, CVI, HTN, hypothyroidism and AAA (s/p EVAR 2016) now s/p right renal artery stenting (6x59m [place] VBX), aortic stent graft placement ([id number] [place] aortic cuff x2), bilateral iliac stent graft placement, and right hypogastric artery coil embolization on 1/17/2024 by Dr. Walton and Dr. Hernandez. Overnight on POD0/POD1 she developed afib RVR with hypotension requiring multiple IV metoprolol pushes and fluid boluses. Electrolytes were repleted.  -Atrial fibrillation uncontrolled rhythm patient now taken Toprol 37.5 mg only every 12 hours not feeling well blood  pressure record from home reviewed borderline low  Patient to switch back to carvedilol 12.5 mg twice a day titrate slowly reported for any blood pressure above 130/80 or heart rate above 80 follow-up progress closely  -Status post endoleak repair doing well no abdominal pain mild superficial bruising anterior abdominal wall groin incision healing follow-up surgery as a scheduled  - Atrial fibrillation patient seen by Coumadin clinic review 1 further follow-up INR closely  - Hypothyroid seen by endocrinology now takes total to 250 mcg's daily follow-up thyroid function test in 3 months  - Hypomagnesemia start a magnesium tablet daily  - Vitamin D deficiency need to start on vitamin D 5000 daily  - Hypokalemia continue with potassium 40 mg twice a day follow-up BMP for further recommendation  -CHF compensated continue low-salt diet  --Chronic respiratory failure and COPD continues 2 L oxygen continue with current inhaler recent pulmonary function test no change  Reevaluate patient in 4 weeks

## 2024-01-25 NOTE — PROGRESS NOTES
SUBJECTIVE    Emma Villela is a 69 y.o. female who is enrolled in the [facility] for anticoagulation management.     Referring Provider: Dr. Ti Nolan  INR Goal: 2.0-3.0  Indication: Atrial Fibrillation/Atrial Flutter    OBJECTIVE    Lab Results   Component Value Date    INR 2.70 (N) 01/25/2024    INR 1.9 (H) 01/21/2024    INR 1.6 (H) 01/20/2024    INR 1.4 (H) 01/19/2024    INR 2.40 (N) 01/08/2024    INR 2.60 (N) 01/04/2024    PROTIME 21.3 (H) 01/21/2024    PROTIME 18.2 (H) 01/20/2024    PROTIME 16.2 (H) 01/19/2024       ASSESSMENT AND PLAN     Current INR is Therapeutic at 2.7. Previous INR was Therapeutic at 2.4. Plan to continue current warfarin regimen of 7.5 mg daily. Follow up with INR check at home in one week.     Laar Richardson, PharmD

## 2024-01-28 DIAGNOSIS — K29.90 GASTRODUODENITIS: ICD-10-CM

## 2024-01-29 ENCOUNTER — APPOINTMENT (OUTPATIENT)
Dept: PRIMARY CARE | Facility: CLINIC | Age: 69
End: 2024-01-29
Payer: MEDICARE

## 2024-01-29 RX ORDER — AMITRIPTYLINE HYDROCHLORIDE 25 MG/1
25 TABLET, FILM COATED ORAL NIGHTLY
Qty: 90 TABLET | Refills: 0 | Status: SHIPPED | OUTPATIENT
Start: 2024-01-29 | End: 2024-04-25

## 2024-01-31 DIAGNOSIS — J44.1 COPD WITH EXACERBATION (MULTI): Primary | ICD-10-CM

## 2024-01-31 DIAGNOSIS — I50.9 CHF (NYHA CLASS III, ACC/AHA STAGE C) (MULTI): ICD-10-CM

## 2024-02-01 ENCOUNTER — ANTICOAGULATION - WARFARIN VISIT (OUTPATIENT)
Dept: PHARMACY | Facility: HOSPITAL | Age: 69
End: 2024-02-01
Payer: MEDICARE

## 2024-02-01 DIAGNOSIS — I48.91 ATRIAL FIBRILLATION, UNSPECIFIED TYPE (MULTI): Primary | ICD-10-CM

## 2024-02-01 NOTE — PROGRESS NOTES
SUBJECTIVE    Emma Villela is a 69 y.o. female who is enrolled in the [facility] for anticoagulation management.     Referring Provider: Dr. Ti Nolan  INR Goal: 2.0-3.0  Indication: Atrial Fibrillation/Atrial Flutter    OBJECTIVE        ASSESSMENT AND PLAN     Current INR is Therapeutic at 2.6. Previous INR was Therapeutic at 2.7. Plan to continue current warfarin regimen of 7.5 mg every day. Follow up with INR check at home in one week.     Katja Chisholm, NolaD

## 2024-02-02 ENCOUNTER — PATIENT OUTREACH (OUTPATIENT)
Dept: CARE COORDINATION | Facility: CLINIC | Age: 69
End: 2024-02-02
Payer: MEDICARE

## 2024-02-02 NOTE — PROGRESS NOTES
Outreach to patient for post hospital follow up.   Per 1/29 PCP note Metoprolol changed to Carvedilol.   Patient states continues to monitor bp, bp much better, and isfeeling much improved since above medication change.  Will continue to monitor for 30 day transition period and will outreach if issues arise.     Danuta GONZALEZ RN CCM  RN Care Coordinator  [facility]  Phone [phone]

## 2024-02-08 ENCOUNTER — ANTICOAGULATION - WARFARIN VISIT (OUTPATIENT)
Dept: PHARMACY | Facility: HOSPITAL | Age: 69
End: 2024-02-08
Payer: MEDICARE

## 2024-02-08 DIAGNOSIS — I48.91 ATRIAL FIBRILLATION, UNSPECIFIED TYPE (MULTI): Primary | ICD-10-CM

## 2024-02-08 NOTE — PROGRESS NOTES
SUBJECTIVE    Emma Villela is a 69 y.o. female who is enrolled in the [facility] for anticoagulation management.     Referring Provider: Dr. Ti Nolan  INR Goal: 2.0-3.0  Indication: Atrial Fibrillation/Atrial Flutter    OBJECTIVE        ASSESSMENT AND PLAN     Current INR is Supratherapeutic at 3.1. Previous INR was Therapeutic at 2.6. Patient reports less vitamin K intake and poor appetite since surgery. Plan to take 3.75 mg x1 dose today, then resume current warfarin regimen of 7.5 mg every day. Follow up with INR check at home in one week.     Katja Chisholm, NolaD

## 2024-02-13 DIAGNOSIS — I10 BENIGN ESSENTIAL HYPERTENSION: ICD-10-CM

## 2024-02-13 DIAGNOSIS — E89.0 HYPOTHYROIDISM, POSTSURGICAL: ICD-10-CM

## 2024-02-13 RX ORDER — LOSARTAN POTASSIUM 100 MG/1
100 TABLET ORAL DAILY
Qty: 90 TABLET | Refills: 0 | Status: SHIPPED | OUTPATIENT
Start: 2024-02-13 | End: 2024-05-13

## 2024-02-13 RX ORDER — LEVOTHYROXINE SODIUM 50 UG/1
TABLET ORAL
Qty: 90 TABLET | Refills: 0 | Status: SHIPPED | OUTPATIENT
Start: 2024-02-13 | End: 2024-05-13

## 2024-02-15 ENCOUNTER — ANTICOAGULATION - WARFARIN VISIT (OUTPATIENT)
Dept: PHARMACY | Facility: HOSPITAL | Age: 69
End: 2024-02-15
Payer: MEDICARE

## 2024-02-15 DIAGNOSIS — I48.91 ATRIAL FIBRILLATION, UNSPECIFIED TYPE (MULTI): Primary | ICD-10-CM

## 2024-02-15 LAB
INR IN PPP BY COAGULATION ASSAY EXTERNAL: 2.4 (ref 2–3)
PROTHROMBIN TIME (PT) IN PPP BY COAGULATION ASSAY EXTERNAL: NORMAL SECONDS

## 2024-02-15 NOTE — PROGRESS NOTES
SUBJECTIVE    Emma Villela is a 69 y.o. female who is enrolled in the [facility] for anticoagulation management.     Referring Provider: Dr. Ti Nolan   INR Goal: 2.0-3.0  Indication: Atrial Fibrillation/Atrial Flutter    OBJECTIVE        ASSESSMENT AND PLAN     Current INR is Therapeutic at 2.4. Previous INR was Supratherapeutic at 3.1. Plan to continue current warfarin regimen of 7.5 mg every day. Follow up with INR check at home in one week.     Katja Chisholm, PharmD

## 2024-02-19 DIAGNOSIS — E78.00 HYPERCHOLESTEROLEMIA: ICD-10-CM

## 2024-02-19 DIAGNOSIS — E87.6 HYPOKALEMIA: ICD-10-CM

## 2024-02-20 RX ORDER — POTASSIUM CHLORIDE 1500 MG/1
20 TABLET, EXTENDED RELEASE ORAL EVERY 12 HOURS
Qty: 180 TABLET | Refills: 1 | Status: SHIPPED | OUTPATIENT
Start: 2024-02-20

## 2024-02-20 RX ORDER — ATORVASTATIN CALCIUM 40 MG/1
40 TABLET, FILM COATED ORAL DAILY
Qty: 90 TABLET | Refills: 1 | Status: SHIPPED | OUTPATIENT
Start: 2024-02-20

## 2024-02-21 ENCOUNTER — HOSPITAL ENCOUNTER (OUTPATIENT)
Dept: RADIOLOGY | Facility: HOSPITAL | Age: 69
Discharge: HOME | End: 2024-02-21
Payer: MEDICARE

## 2024-02-21 ENCOUNTER — OFFICE VISIT (OUTPATIENT)
Dept: VASCULAR SURGERY | Facility: HOSPITAL | Age: 69
End: 2024-02-21
Payer: MEDICARE

## 2024-02-21 VITALS
HEART RATE: 85 BPM | DIASTOLIC BLOOD PRESSURE: 90 MMHG | BODY MASS INDEX: 45.99 KG/M2 | SYSTOLIC BLOOD PRESSURE: 142 MMHG | WEIGHT: 293 LBS | HEIGHT: 67 IN

## 2024-02-21 DIAGNOSIS — I71.43 INFRARENAL ABDOMINAL AORTIC ANEURYSM (AAA) WITHOUT RUPTURE (CMS-HCC): ICD-10-CM

## 2024-02-21 DIAGNOSIS — Z09 POSTOPERATIVE EXAMINATION: ICD-10-CM

## 2024-02-21 DIAGNOSIS — Z09 FOLLOW-UP EXAM: ICD-10-CM

## 2024-02-21 DIAGNOSIS — I71.40 ABDOMINAL AORTIC ANEURYSM (AAA) WITHOUT RUPTURE, UNSPECIFIED PART (CMS-HCC): Primary | ICD-10-CM

## 2024-02-21 PROCEDURE — 3075F SYST BP GE 130 - 139MM HG: CPT | Performed by: SURGERY

## 2024-02-21 PROCEDURE — 1160F RVW MEDS BY RX/DR IN RCRD: CPT | Performed by: SURGERY

## 2024-02-21 PROCEDURE — 99024 POSTOP FOLLOW-UP VISIT: CPT | Performed by: SURGERY

## 2024-02-21 PROCEDURE — 74174 CTA ABD&PLVS W/CONTRAST: CPT | Performed by: RADIOLOGY

## 2024-02-21 PROCEDURE — 3008F BODY MASS INDEX DOCD: CPT | Performed by: SURGERY

## 2024-02-21 PROCEDURE — 3079F DIAST BP 80-89 MM HG: CPT | Performed by: SURGERY

## 2024-02-21 PROCEDURE — 2550000001 HC RX 255 CONTRASTS: Performed by: SURGERY

## 2024-02-21 PROCEDURE — 1126F AMNT PAIN NOTED NONE PRSNT: CPT | Performed by: SURGERY

## 2024-02-21 PROCEDURE — 1157F ADVNC CARE PLAN IN RCRD: CPT | Performed by: SURGERY

## 2024-02-21 PROCEDURE — 74174 CTA ABD&PLVS W/CONTRAST: CPT

## 2024-02-21 PROCEDURE — 1036F TOBACCO NON-USER: CPT | Performed by: SURGERY

## 2024-02-21 PROCEDURE — 1159F MED LIST DOCD IN RCRD: CPT | Performed by: SURGERY

## 2024-02-21 RX ADMIN — IOHEXOL 75 ML: 350 INJECTION, SOLUTION INTRAVENOUS at 10:51

## 2024-02-21 ASSESSMENT — PAIN SCALES - GENERAL: PAINLEVEL: 0-NO PAIN

## 2024-02-21 ASSESSMENT — ENCOUNTER SYMPTOMS
DEPRESSION: 0
LOSS OF SENSATION IN FEET: 0
OCCASIONAL FEELINGS OF UNSTEADINESS: 1

## 2024-02-21 NOTE — PROGRESS NOTES
Vascular Surgery Clinic Note    CC: AAA    HPI:  Emma Villela is 69 y.o. female with history of AAA s/p EVAR and recent revision for type Ia/b leaks. I placed two proximal cuffs and a right renal snorkel, and extended both iliac limbs with right c/c. She had an extended stay due to respiratory issues (COPD on O2). She feels well. CTA shows patent renal snorkel and good distal seal. Possible gutter leak proximally, stable aneurysm sac size.     Medical History:   has a past medical history of Cellulitis, unspecified, COPD (chronic obstructive pulmonary disease) (CMS/[facility]), Essential (primary) hypertension (05/21/2013), Nontoxic goiter, unspecified, Other conditions influencing health status, Personal history of other diseases of the circulatory system (03/03/2015), Personal history of other diseases of the nervous system and sense organs, Personal history of other diseases of the respiratory system (10/21/2014), Personal history of other medical treatment (01/25/2022), Personal history of other specified conditions (06/25/2013), and Sleep apnea.    Meds:   Current Outpatient Medications on File Prior to Visit   Medication Sig Dispense Refill    acetaminophen (Tylenol) 325 mg tablet Take 1 tablet (325 mg) by mouth every 4 hours if needed.      albuterol 90 mcg/actuation inhaler Inhale 2 puffs every 6 hours if needed.      amitriptyline (Elavil) 25 mg tablet TAKE ONE TABLET BY MOUTH AT BEDTIME 90 tablet 0    aspirin 81 mg EC tablet Take 1 tablet (81 mg) by mouth once daily.      atorvastatin (Lipitor) 40 mg tablet TAKE ONE TABLET BY MOUTH DAILY 90 tablet 1    budesonide-formoteroL (Symbicort) 160-4.5 mcg/actuation inhaler Inhale 2 puffs 2 times a day.      carvedilol (Coreg) 25 mg tablet Take 0.5 tablets (12.5 mg) by mouth 2 times a day with meals. Take with food. 180 tablet 0    cholecalciferol (Vitamin D-3) 125 MCG (5000 UT) capsule Take 1 capsule (125 mcg) by mouth once daily. 90 capsule 3    ferrous gluconate 324  (38 Fe) MG tablet TAKE ONE TABLET BY MOUTH DAILY 90 tablet 0    furosemide (Lasix) 40 mg tablet TAKE ONE TABLET BY MOUTH TWO TIMES A  tablet 0    hydrALAZINE (Apresoline) 100 mg tablet TAKE ONE TABLET BY MOUTH THREE TIMES A  tablet 0    levothyroxine (Synthroid, Levoxyl) 200 mcg tablet TAKE ONE TABLET BY MOUTH EVERY DAY ALONG WITH A 25 MCG TABLET FOR A TOTAL DAILY DOSE  MCG 90 tablet 1    levothyroxine (Synthroid, Levoxyl) 50 mcg tablet TAKE 1 TABLET BY MOUTH EVERY DAY ALONG WITH 200MCG TABLET TO EQUAL TOTAL DAILY DOSE 250MCG 90 tablet 0    losartan (Cozaar) 100 mg tablet TAKE ONE TABLET BY MOUTH DAILY 90 tablet 0    magnesium oxide (Mag-Ox) 400 mg (241.3 mg magnesium) tablet Take 1 tablet (400 mg) by mouth 2 times a day with meals.      meclizine (Antivert) 25 mg tablet TAKE ONE TABLET BY MOUTH AT BEDTIME 30 tablet 2    metOLazone (Zaroxolyn) 5 mg tablet Take 1 tablet (5 mg) by mouth. Twice a week      oxybutynin XL (Ditropan-XL) 15 mg 24 hr tablet Take 1 tablet (15 mg) by mouth once daily.      pantoprazole (ProtoNix) 40 mg EC tablet TAKE ONE TABLET BY MOUTH EVERY DAY 90 tablet 1    potassium chloride CR 20 mEq ER tablet TAKE ONE TABLET BY MOUTH EVERY 12 HOURS 180 tablet 1    warfarin (Jantoven) 7.5 mg tablet TAKE ONE TABLET BY MOUTH EVERY DAY 90 tablet 1    chlorhexidine (Peridex) 0.12 % solution Use 15 mL in the mouth or throat see administration instructions for 2 doses. Use the night before and morning of surgery. (Patient not taking: Reported on 2/21/2024) 473 mL 0    isosorbide mononitrate ER (Imdur) 120 mg 24 hr tablet TAKE ONE TABLET BY MOUTH once DAILY (Patient not taking: Reported on 2/21/2024) 90 tablet 1    [DISCONTINUED] atorvastatin (Lipitor) 40 mg tablet TAKE ONE TABLET BY MOUTH DAILY 90 tablet 0    [DISCONTINUED] potassium chloride CR 20 mEq ER tablet TAKE ONE TABLET BY MOUTH EVERY 12 HOURS 60 tablet 0     Current Facility-Administered Medications on File Prior to Visit    Medication Dose Route Frequency Provider Last Rate Last Admin    [COMPLETED] iohexol (OMNIPaque) 350 mg iodine/mL solution 75 mL  75 mL intravenous Once in imaging Sj Walton MD   75 mL at 02/21/24 1051        Allergies:   No Known Allergies    SH:    Social Determinants of Health     Tobacco Use: Medium Risk (2/21/2024)    Patient History     Smoking Tobacco Use: Former     Smokeless Tobacco Use: Never     Passive Exposure: Not on file   Alcohol Use: Not At Risk (1/17/2024)    AUDIT-C     Frequency of Alcohol Consumption: Never     Average Number of Drinks: Patient does not drink     Frequency of Binge Drinking: Never   Financial Resource Strain: Low Risk  (1/17/2024)    Overall Financial Resource Strain (CARDIA)     Difficulty of Paying Living Expenses: Not hard at all   Food Insecurity: Not on file   Transportation Needs: No Transportation Needs (1/22/2024)    PRAPARE - Transportation     Lack of Transportation (Medical): No     Lack of Transportation (Non-Medical): No   Physical Activity: Not on file   Stress: Not on file   Social Connections: Unknown (1/22/2024)    Social Connection and Isolation Panel [NHANES]     Frequency of Communication with Friends and Family: Not on file     Frequency of Social Gatherings with Friends and Family: Not on file     Attends Sabianism Services: Not on file     Active Member of Clubs or Organizations: Not on file     Attends Club or Organization Meetings: Not on file     Marital Status:    Intimate Partner Violence: Not on file   Depression: Not at risk (1/17/2024)    PHQ-2     PHQ-2 Score: 0   Housing Stability: Low Risk  (1/17/2024)    Housing Stability Vital Sign     Unable to Pay for Housing in the Last Year: No     Number of Places Lived in the Last Year: 1     Unstable Housing in the Last Year: No   Utilities: Not on file   Digital Equity: Not on file        FH:  Family History   Problem Relation Name Age of Onset    Stroke Father      Heart attack  Sister      Breast cancer Maternal Grandmother          ROS:  All systems were reviewed and are negative except as per HPI.    Objective:  Vitals:  Vitals:    02/21/24 1048   BP: 142/90   Pulse:         Exam:  In NAD, well appearing  Abd Soft, ND/NT  Vascular examination:  Palpable femoral pulses    Assessment & Plan:  Emma Villela is 69 y.o. female s/p EVAR revision w RRA crow. Repeat CTA in 6 months.      No LOS data to display         Sj Walton M.D.

## 2024-02-22 ENCOUNTER — ANTICOAGULATION - WARFARIN VISIT (OUTPATIENT)
Dept: PHARMACY | Facility: HOSPITAL | Age: 69
End: 2024-02-22
Payer: MEDICARE

## 2024-02-22 DIAGNOSIS — I48.91 ATRIAL FIBRILLATION, UNSPECIFIED TYPE (MULTI): Primary | ICD-10-CM

## 2024-02-22 LAB
INR IN PPP BY COAGULATION ASSAY EXTERNAL: 2.9 (ref 2–3)
PROTHROMBIN TIME (PT) IN PPP BY COAGULATION ASSAY EXTERNAL: ABNORMAL SECONDS

## 2024-02-22 NOTE — PROGRESS NOTES
SUBJECTIVE    Emma Villela is a 69 y.o. female who is enrolled in the [facility] for anticoagulation management.     Referring Provider: Dr. Ti Nolan   INR Goal: 2.0-3.0  Indication: Atrial Fibrillation/Atrial Flutter    OBJECTIVE        ASSESSMENT AND PLAN     Current INR is Therapeutic at 2.9. Previous INR was Therapeutic at 2.4. Plan to continue current warfarin regimen of 7.5 mg every day. Follow up with INR check at home in one week.     Katja Chisholm, NolaD

## 2024-02-26 ENCOUNTER — APPOINTMENT (OUTPATIENT)
Dept: PULMONOLOGY | Facility: CLINIC | Age: 69
End: 2024-02-26
Payer: MEDICARE

## 2024-02-28 ENCOUNTER — OFFICE VISIT (OUTPATIENT)
Dept: PRIMARY CARE | Facility: CLINIC | Age: 69
End: 2024-02-28
Payer: MEDICARE

## 2024-02-28 VITALS
HEART RATE: 83 BPM | TEMPERATURE: 96.9 F | SYSTOLIC BLOOD PRESSURE: 130 MMHG | DIASTOLIC BLOOD PRESSURE: 80 MMHG | OXYGEN SATURATION: 96 %

## 2024-02-28 DIAGNOSIS — I71.40 ABDOMINAL AORTIC ANEURYSM (AAA) WITHOUT RUPTURE, UNSPECIFIED PART (CMS-HCC): ICD-10-CM

## 2024-02-28 DIAGNOSIS — I10 BENIGN ESSENTIAL HYPERTENSION: Primary | ICD-10-CM

## 2024-02-28 DIAGNOSIS — E78.00 HYPERCHOLESTEROLEMIA: ICD-10-CM

## 2024-02-28 DIAGNOSIS — J96.11 CHRONIC RESPIRATORY FAILURE WITH HYPOXIA (MULTI): ICD-10-CM

## 2024-02-28 PROCEDURE — 1157F ADVNC CARE PLAN IN RCRD: CPT | Performed by: INTERNAL MEDICINE

## 2024-02-28 PROCEDURE — 3008F BODY MASS INDEX DOCD: CPT | Performed by: INTERNAL MEDICINE

## 2024-02-28 PROCEDURE — 1160F RVW MEDS BY RX/DR IN RCRD: CPT | Performed by: INTERNAL MEDICINE

## 2024-02-28 PROCEDURE — 1159F MED LIST DOCD IN RCRD: CPT | Performed by: INTERNAL MEDICINE

## 2024-02-28 PROCEDURE — 3079F DIAST BP 80-89 MM HG: CPT | Performed by: INTERNAL MEDICINE

## 2024-02-28 PROCEDURE — 3075F SYST BP GE 130 - 139MM HG: CPT | Performed by: INTERNAL MEDICINE

## 2024-02-28 PROCEDURE — 1126F AMNT PAIN NOTED NONE PRSNT: CPT | Performed by: INTERNAL MEDICINE

## 2024-02-28 PROCEDURE — 99214 OFFICE O/P EST MOD 30 MIN: CPT | Performed by: INTERNAL MEDICINE

## 2024-02-28 PROCEDURE — 1036F TOBACCO NON-USER: CPT | Performed by: INTERNAL MEDICINE

## 2024-02-28 ASSESSMENT — ENCOUNTER SYMPTOMS
SORE THROAT: 0
ARTHRALGIAS: 0
BLOOD IN STOOL: 0
ABDOMINAL PAIN: 0
DIZZINESS: 0
DIFFICULTY URINATING: 0
HEADACHES: 0
PALPITATIONS: 0
SINUS PAIN: 0
COUGH: 0
UNEXPECTED WEIGHT CHANGE: 0
FEVER: 0
DIARRHEA: 0
BRUISES/BLEEDS EASILY: 0
FATIGUE: 0
WHEEZING: 0

## 2024-02-28 NOTE — PROGRESS NOTES
Subjective   Patient ID: Emma Villela is a 69 y.o. female who presents for medication change follow up (Questions regarding restarting isorbide and taking a full tablet of the carvedilol 25mg, dr baeza will be increasing the oxybutynin but not sure how much).    - Hypertension controlled continue with carvedilol half tablet twice a day patient controlled no need to go to isosorbide at this time continue monitoring closely  - afib (s/p ablation, on warfarin), COPD (on 2L continuous oxygen), morbid obesity, CVI, HTN, hypothyroidism and AAA (s/p EVAR 2016) now s/p right renal artery stenting (6x59m [place] VBX), aortic stent graft placement ([id number] [place] aortic cuff x2), bilateral iliac stent graft placement, and right hypogastric artery coil embolization on 1/17/2024 by Dr. Walton and Dr. Hernandez. Overnight on POD0/POD1 she developed afib RVR with hypotension requiring multiple IV metoprolol  Is still having endoleak awaiting follow-up 6 months ultrasound.  -Atrial fibrillation controlled to continue with carvedilol blood pressure controlled heart rate controlled  - Atrial fibrillation patient seen by Coumadin clinic review 1 further follow-up INR closely  - Hypothyroid seen by endocrinology now takes total to 250 mcg's daily follow-up thyroid function test in 3 months  - Hypomagnesemia start a magnesium tablet daily  - Vitamin D deficiency need to start on vitamin D 5000 daily  - Hypokalemia continue with potassium 40 mg twice a day follow-up BMP for further recommendation  -CHF compensated continue low-salt diet  --Chronic respiratory failure and COPD continues 2 L oxygen continue with current inhaler recent pulmonary function test no change  Follow-up 3 months                Review of Systems   Constitutional:  Negative for fatigue, fever and unexpected weight change.   HENT:  Negative for congestion, ear discharge, ear pain, mouth sores, sinus pain and sore throat.    Eyes:  Negative for visual disturbance.    Respiratory:  Negative for cough and wheezing.    Cardiovascular:  Negative for chest pain, palpitations and leg swelling.   Gastrointestinal:  Negative for abdominal pain, blood in stool and diarrhea.   Genitourinary:  Negative for difficulty urinating.   Musculoskeletal:  Negative for arthralgias.   Skin:  Negative for rash.   Neurological:  Negative for dizziness and headaches.   Hematological:  Does not bruise/bleed easily.   Psychiatric/Behavioral:  Negative for behavioral problems.    All other systems reviewed and are negative.      Objective   Lab Results   Component Value Date    HGBA1C 5.5 12/28/2023      /80   Pulse 83   Temp 36.1 °C (96.9 °F)   SpO2 96%     Physical Exam  Vitals and nursing note reviewed.   Constitutional:       Appearance: Normal appearance. She is obese.   HENT:      Head: Normocephalic.      Nose: Nose normal.   Eyes:      Conjunctiva/sclera: Conjunctivae normal.      Pupils: Pupils are equal, round, and reactive to light.   Cardiovascular:      Rate and Rhythm: Regular rhythm.   Pulmonary:      Effort: Pulmonary effort is normal.      Breath sounds: Normal breath sounds.   Abdominal:      General: Abdomen is flat.      Palpations: Abdomen is soft.   Musculoskeletal:      Cervical back: Neck supple.   Skin:     General: Skin is warm.   Neurological:      General: No focal deficit present.      Mental Status: She is oriented to person, place, and time.   Psychiatric:         Mood and Affect: Mood normal.         Assessment/Plan   Emma was seen today for medication change follow up.  Diagnoses and all orders for this visit:  Benign essential hypertension (Primary)  Hypercholesterolemia  Abdominal aortic aneurysm (AAA) without rupture, unspecified part (CMS/HCC)  Chronic respiratory failure with hypoxia (CMS/HCC)  Other orders  -     Follow Up In Primary Care - Established; Future   - Hypertension controlled continue with carvedilol half tablet twice a day patient controlled  no need to go to isosorbide at this time continue monitoring closely  - afib (s/p ablation, on warfarin), COPD (on 2L continuous oxygen), morbid obesity, CVI, HTN, hypothyroidism and AAA (s/p EVAR 2016) now s/p right renal artery stenting (6x59m [place] VBX), aortic stent graft placement ([id number] [place] aortic cuff x2), bilateral iliac stent graft placement, and right hypogastric artery coil embolization on 1/17/2024 by Dr. Walton and Dr. Hernandez. Overnight on POD0/POD1 she developed afib RVR with hypotension requiring multiple IV metoprolol  Is still having endoleak awaiting follow-up 6 months ultrasound.  -Atrial fibrillation controlled to continue with carvedilol blood pressure controlled heart rate controlled  - Atrial fibrillation patient seen by Coumadin clinic review 1 further follow-up INR closely  - Hypothyroid seen by endocrinology now takes total to 250 mcg's daily follow-up thyroid function test in 3 months  - Hypomagnesemia start a magnesium tablet daily  - Vitamin D deficiency need to start on vitamin D 5000 daily  - Hypokalemia continue with potassium 40 mg twice a day follow-up BMP for further recommendation  -CHF compensated continue low-salt diet  --Chronic respiratory failure and COPD continues 2 L oxygen continue with current inhaler recent pulmonary function test no change  Follow-up 3 months

## 2024-02-29 ENCOUNTER — ANTICOAGULATION - WARFARIN VISIT (OUTPATIENT)
Dept: PHARMACY | Facility: HOSPITAL | Age: 69
End: 2024-02-29
Payer: MEDICARE

## 2024-02-29 DIAGNOSIS — I48.91 ATRIAL FIBRILLATION, UNSPECIFIED TYPE (MULTI): Primary | ICD-10-CM

## 2024-02-29 LAB
INR IN PPP BY COAGULATION ASSAY EXTERNAL: 2 (ref 2–3)
PROTHROMBIN TIME (PT) IN PPP BY COAGULATION ASSAY EXTERNAL: ABNORMAL SECONDS

## 2024-02-29 NOTE — PROGRESS NOTES
SUBJECTIVE    Emma Villela is a 69 y.o. female who is enrolled in the [facility] for anticoagulation management.     Referring Provider: Dr. Ti Nolan  INR Goal: 2.0-3.0  Indication: Atrial Fibrillation/Atrial Flutter    OBJECTIVE        ASSESSMENT AND PLAN     Current INR is Therapeutic at 2.0. Previous INR was Therapeutic at 2.9. Plan to continue current warfarin regimen of 7.5 mg every day. Follow up with INR check at home in one week.     Katja Chisholm, NolaD

## 2024-03-06 DIAGNOSIS — D64.89 ANEMIA DUE TO OTHER CAUSE, NOT CLASSIFIED: ICD-10-CM

## 2024-03-06 RX ORDER — FERROUS GLUCONATE 324(38)MG
1 TABLET ORAL DAILY
Qty: 90 TABLET | Refills: 1 | Status: SHIPPED | OUTPATIENT
Start: 2024-03-06

## 2024-03-07 ENCOUNTER — ANTICOAGULATION - WARFARIN VISIT (OUTPATIENT)
Dept: PHARMACY | Facility: HOSPITAL | Age: 69
End: 2024-03-07
Payer: MEDICARE

## 2024-03-07 DIAGNOSIS — I48.91 ATRIAL FIBRILLATION, UNSPECIFIED TYPE (MULTI): Primary | ICD-10-CM

## 2024-03-07 NOTE — PROGRESS NOTES
SUBJECTIVE    Emma Villela is a 69 y.o. female who is enrolled in the [facility] Therapy [facility] for anticoagulation management.     Referring Provider: Dr. Ti Nolan   INR Goal: 2.0-3.0  Indication: Atrial Fibrillation/Atrial Flutter    OBJECTIVE        ASSESSMENT AND PLAN     Current INR is Therapeutic at 2.9. Previous INR was Therapeutic at 2.0. Plan to continue current warfarin regimen of 7.5 mg every day. Follow up with INR check at home in one week.     Katja Chisholm, NolaD

## 2024-03-11 ENCOUNTER — APPOINTMENT (OUTPATIENT)
Dept: PULMONOLOGY | Facility: CLINIC | Age: 69
End: 2024-03-11
Payer: MEDICARE

## 2024-03-14 ENCOUNTER — ANTICOAGULATION - WARFARIN VISIT (OUTPATIENT)
Dept: PHARMACY | Facility: HOSPITAL | Age: 69
End: 2024-03-14
Payer: MEDICARE

## 2024-03-14 DIAGNOSIS — I48.91 ATRIAL FIBRILLATION, UNSPECIFIED TYPE (MULTI): Primary | ICD-10-CM

## 2024-03-14 NOTE — PROGRESS NOTES
SUBJECTIVE    Emma Villela is a 69 y.o. female who is enrolled in the [facility] for anticoagulation management.     Referring Provider: Dr. Ti Nolan  INR Goal: 2.0-3.0  Indication: Atrial Fibrillation/Atrial Flutter    OBJECTIVE    Lab Results   Component Value Date    INR 2.90 (N) 03/07/2024    INR 2.00 (N) 02/29/2024    INR 2.90 (N) 02/22/2024    PROTIME 21.3 (H) 01/21/2024    PROTIME 18.2 (H) 01/20/2024    PROTIME 16.2 (H) 01/19/2024       ASSESSMENT AND PLAN     Current INR is Therapeutic at 3.0. Previous INR was Therapeutic at 2.9. Plan to continue current warfarin regimen of 7.5 mg every day. Follow up with INR check at home in one week.     Katja Chisholm, PharmD

## 2024-03-21 ENCOUNTER — ANTICOAGULATION - WARFARIN VISIT (OUTPATIENT)
Dept: PHARMACY | Facility: HOSPITAL | Age: 69
End: 2024-03-21
Payer: MEDICARE

## 2024-03-21 DIAGNOSIS — E89.0 HYPOTHYROIDISM, POSTSURGICAL: ICD-10-CM

## 2024-03-21 DIAGNOSIS — K21.9 GASTROESOPHAGEAL REFLUX DISEASE, UNSPECIFIED WHETHER ESOPHAGITIS PRESENT: ICD-10-CM

## 2024-03-21 DIAGNOSIS — I48.91 ATRIAL FIBRILLATION, UNSPECIFIED TYPE (MULTI): Primary | ICD-10-CM

## 2024-03-21 RX ORDER — LEVOTHYROXINE SODIUM 200 UG/1
TABLET ORAL
Qty: 90 TABLET | Refills: 0 | Status: SHIPPED | OUTPATIENT
Start: 2024-03-21

## 2024-03-21 RX ORDER — PANTOPRAZOLE SODIUM 40 MG/1
TABLET, DELAYED RELEASE ORAL
Qty: 90 TABLET | Refills: 0 | Status: SHIPPED | OUTPATIENT
Start: 2024-03-21

## 2024-03-21 NOTE — PROGRESS NOTES
SUBJECTIVE    Emma Villela is a 69 y.o. female who is enrolled in the [facility] for anticoagulation management.     Referring Provider: Dr. Ti Nolan  INR Goal: 2.0-3.0  Indication: Atrial Fibrillation/Atrial Flutter    OBJECTIVE    Lab Results   Component Value Date    INR 3.00 (N) 03/14/2024    INR 2.90 (N) 03/07/2024    INR 2.00 (N) 02/29/2024    PROTIME 21.3 (H) 01/21/2024    PROTIME 18.2 (H) 01/20/2024    PROTIME 16.2 (H) 01/19/2024       ASSESSMENT AND PLAN     Current INR is Therapeutic at 2.0. Previous INR was Therapeutic at 3.0. Plan to continue current warfarin regimen of 7.5 mg every day. Follow up with INR check at home in one week.     Katja Chisholm, PharmD

## 2024-04-02 ENCOUNTER — ANTICOAGULATION - WARFARIN VISIT (OUTPATIENT)
Dept: PHARMACY | Facility: HOSPITAL | Age: 69
End: 2024-04-02
Payer: MEDICARE

## 2024-04-02 DIAGNOSIS — I48.91 ATRIAL FIBRILLATION, UNSPECIFIED TYPE (MULTI): Primary | ICD-10-CM

## 2024-04-02 NOTE — PROGRESS NOTES
SUBJECTIVE    Emma Villela is a 69 y.o. female who is enrolled in the [facility] for anticoagulation management.     Referring Provider: Ti Nolan  INR Goal: 2.0-3.0  Indication: Atrial Fibrillation/Atrial Flutter    OBJECTIVE    Lab Results   Component Value Date    INR 2.90 (N) 04/02/2024    INR 2.00 (N) 03/21/2024    INR 3.00 (N) 03/14/2024    PROTIME 21.3 (H) 01/21/2024    PROTIME 18.2 (H) 01/20/2024    PROTIME 16.2 (H) 01/19/2024       ASSESSMENT AND PLAN     Current INR is Therapeutic at 2.9. Previous INR was Therapeutic at 2.0. Plan to continue current warfarin regimen of 7.5mg daily. Follow up with INR check at home in one week.     Naina Jose, PharmD  PGY-1 Pharmacy Resident

## 2024-04-02 NOTE — PROGRESS NOTES
I reviewed the pharmacy resident's documentation and discussed the patient with the resident. I agree with the resident medical decision making as documented in the note.     Katja Chisholm, PharmD

## 2024-04-11 ENCOUNTER — ANTICOAGULATION - WARFARIN VISIT (OUTPATIENT)
Dept: PHARMACY | Facility: HOSPITAL | Age: 69
End: 2024-04-11
Payer: MEDICARE

## 2024-04-11 DIAGNOSIS — I48.91 ATRIAL FIBRILLATION, UNSPECIFIED TYPE (MULTI): Primary | ICD-10-CM

## 2024-04-11 NOTE — PROGRESS NOTES
SUBJECTIVE    Emma Villela is a 69 y.o. female who is enrolled in the [facility] for anticoagulation management.     Referring Provider: Ti Nolan   INR Goal: 2.0-3.0  Indication: Atrial Fibrillation/Atrial Flutter    OBJECTIVE    Lab Results   Component Value Date    INR 2.90 (N) 04/11/2024    INR 2.90 (N) 04/02/2024    INR 2.00 (N) 03/21/2024    PROTIME 21.3 (H) 01/21/2024    PROTIME 18.2 (H) 01/20/2024    PROTIME 16.2 (H) 01/19/2024       ASSESSMENT AND PLAN     Current INR is Therapeutic at 2.9. Previous INR was Therapeutic at 2.9. Plan to continue current warfarin regimen of 7.5mg daily. Follow up with INR check at home in one week.     Naina Jose, PharmD  PGY-1 Pharmacy Resident

## 2024-04-18 ENCOUNTER — ANTICOAGULATION - WARFARIN VISIT (OUTPATIENT)
Dept: PHARMACY | Facility: HOSPITAL | Age: 69
End: 2024-04-18
Payer: MEDICARE

## 2024-04-18 DIAGNOSIS — I48.91 ATRIAL FIBRILLATION, UNSPECIFIED TYPE (MULTI): Primary | ICD-10-CM

## 2024-04-18 LAB
INR IN PPP BY COAGULATION ASSAY EXTERNAL: 2.8 (ref 2–3)
PROTHROMBIN TIME (PT) IN PPP BY COAGULATION ASSAY EXTERNAL: ABNORMAL SECONDS

## 2024-04-18 NOTE — PROGRESS NOTES
SUBJECTIVE    Emma Villela is a 69 y.o. female who is enrolled in the [facility] for anticoagulation management.     Referring Provider: Ti Nolan   INR Goal: 2.0-3.0  Indication: Atrial Fibrillation/Atrial Flutter    OBJECTIVE    Lab Results   Component Value Date    INR 2.80 (N) 04/18/2024    INR 2.90 (N) 04/11/2024    INR 2.90 (N) 04/02/2024    PROTIME 21.3 (H) 01/21/2024    PROTIME 18.2 (H) 01/20/2024    PROTIME 16.2 (H) 01/19/2024       ASSESSMENT AND PLAN     Current INR is Therapeutic at 2.8. Previous INR was Therapeutic at 2.9. Plan to continue current warfarin regimen of 7.5 mg every day. Follow up with INR check at home in one week.     Naina Jose, PharmD  PGY-1 Pharmacy Resident

## 2024-04-22 ENCOUNTER — APPOINTMENT (OUTPATIENT)
Dept: PULMONOLOGY | Facility: CLINIC | Age: 69
End: 2024-04-22
Payer: MEDICARE

## 2024-04-25 ENCOUNTER — ANTICOAGULATION - WARFARIN VISIT (OUTPATIENT)
Dept: PHARMACY | Facility: HOSPITAL | Age: 69
End: 2024-04-25
Payer: MEDICARE

## 2024-04-25 DIAGNOSIS — I10 BENIGN ESSENTIAL HYPERTENSION: ICD-10-CM

## 2024-04-25 DIAGNOSIS — I48.91 ATRIAL FIBRILLATION, UNSPECIFIED TYPE (MULTI): Primary | ICD-10-CM

## 2024-04-25 DIAGNOSIS — K29.90 GASTRODUODENITIS: ICD-10-CM

## 2024-04-25 RX ORDER — AMITRIPTYLINE HYDROCHLORIDE 25 MG/1
25 TABLET, FILM COATED ORAL NIGHTLY
Qty: 90 TABLET | Refills: 0 | Status: SHIPPED | OUTPATIENT
Start: 2024-04-25

## 2024-04-25 RX ORDER — HYDRALAZINE HYDROCHLORIDE 100 MG/1
100 TABLET, FILM COATED ORAL 3 TIMES DAILY
Qty: 270 TABLET | Refills: 0 | Status: SHIPPED | OUTPATIENT
Start: 2024-04-25

## 2024-04-25 NOTE — PROGRESS NOTES
Subjective     Emma Villela is a 69 y.o. female who presents for anticoagulation follow up.     Location: [facility]     Referring Provider: Ti Nolan   INR Goal: 2.0-3.0  Indication: Atrial Fibrillation/Atrial Flutter    Bleeding signs/symptoms: No  Bruising: No   Major bleeding event: No  Thrombosis signs/symptoms: No  Thromboembolic event: No  Missed doses: No  Extra doses: No  Medication changes: No  Dietary changes: No  Change in health: No  Change in activity: No  Alcohol: No  Other concerns: No  Upcoming Surgeries: no  Parenteral anticoagulation: no    Current Outpatient Medications on File Prior to Visit   Medication Sig Dispense Refill    acetaminophen (Tylenol) 325 mg tablet Take 1 tablet (325 mg) by mouth every 4 hours if needed.      albuterol 90 mcg/actuation inhaler Inhale 2 puffs every 6 hours if needed.      amitriptyline (Elavil) 25 mg tablet TAKE ONE TABLET BY MOUTH AT BEDTIME 90 tablet 0    aspirin 81 mg EC tablet Take 1 tablet (81 mg) by mouth once daily.      atorvastatin (Lipitor) 40 mg tablet TAKE ONE TABLET BY MOUTH DAILY 90 tablet 1    budesonide-formoteroL (Symbicort) 160-4.5 mcg/actuation inhaler Inhale 2 puffs 2 times a day.      carvedilol (Coreg) 25 mg tablet Take 0.5 tablets (12.5 mg) by mouth 2 times a day with meals. Take with food. 180 tablet 0    cholecalciferol (Vitamin D-3) 125 MCG (5000 UT) capsule Take 1 capsule (125 mcg) by mouth once daily. 90 capsule 3    ferrous gluconate 324 (38 Fe) MG tablet TAKE ONE TABLET BY MOUTH DAILY 90 tablet 1    furosemide (Lasix) 40 mg tablet TAKE ONE TABLET BY MOUTH TWO TIMES A  tablet 0    hydrALAZINE (Apresoline) 100 mg tablet TAKE ONE TABLET BY MOUTH THREE TIMES A  tablet 0    levothyroxine (Synthroid, Levoxyl) 200 mcg tablet Take one tablet by mouth every day along with a 50mcg tablet for a total daily dose of 250mcg 90 tablet 0    levothyroxine (Synthroid, Levoxyl) 50 mcg tablet  TAKE 1 TABLET BY MOUTH EVERY DAY ALONG WITH 200MCG TABLET TO EQUAL TOTAL DAILY DOSE 250MCG 90 tablet 0    losartan (Cozaar) 100 mg tablet TAKE ONE TABLET BY MOUTH DAILY 90 tablet 0    magnesium oxide (Mag-Ox) 400 mg (241.3 mg magnesium) tablet Take 1 tablet (400 mg) by mouth 2 times a day with meals.      meclizine (Antivert) 25 mg tablet TAKE ONE TABLET BY MOUTH AT BEDTIME 30 tablet 2    metOLazone (Zaroxolyn) 5 mg tablet Take 1 tablet (5 mg) by mouth. Twice a week      oxybutynin XL (Ditropan-XL) 15 mg 24 hr tablet Take 1 tablet (15 mg) by mouth once daily.      pantoprazole (ProtoNix) 40 mg EC tablet TAKE ONE TABLET BY MOUTH EVERY DAY 90 tablet 0    potassium chloride CR 20 mEq ER tablet TAKE ONE TABLET BY MOUTH EVERY 12 HOURS 180 tablet 1    warfarin (Jantoven) 7.5 mg tablet TAKE ONE TABLET BY MOUTH EVERY DAY 90 tablet 1     No current facility-administered medications on file prior to visit.        Objective   Anticoagulation Summary  As of 4/25/2024      INR goal:  2.0-3.0   TTR:  80.4% (5.2 mo)   INR used for dosing:  3.20 (4/25/2024)   Weekly warfarin total:  52.5 mg             Lab Results   Component Value Date    INR 3.20 (A) 04/25/2024    INR 2.80 (N) 04/18/2024    INR 2.90 (N) 04/11/2024    PROTIME 21.3 (H) 01/21/2024    PROTIME 18.2 (H) 01/20/2024    PROTIME 16.2 (H) 01/19/2024       Assessment/Plan   Current INR is Supratherapeutic at 3.2. Previous INR was Therapeutic at 2.8. Patient reports not eating as many greens as usual. Given that the INR is slightly out of range due to diet changes, dose changes are not indicated at this time. Patient is going to eat an extra serving of greens tonight and continue on a regular diet. Advised patient to continue current warfarin regimen of 7.5mg every day.   Follow Up: 1 week    Naina Jose, PharmD  PGY-1 Pharmacy Resident

## 2024-05-01 DIAGNOSIS — E55.9 VITAMIN D DEFICIENCY: ICD-10-CM

## 2024-05-01 RX ORDER — CHOLECALCIFEROL (VITAMIN D3) 125 MCG
125 CAPSULE ORAL DAILY
Qty: 90 CAPSULE | Refills: 0 | Status: SHIPPED | OUTPATIENT
Start: 2024-05-01

## 2024-05-02 ENCOUNTER — ANTICOAGULATION - WARFARIN VISIT (OUTPATIENT)
Dept: PHARMACY | Facility: HOSPITAL | Age: 69
End: 2024-05-02
Payer: MEDICARE

## 2024-05-02 LAB
INR IN PPP BY COAGULATION ASSAY EXTERNAL: 2.5 (ref 2–3)
PROTHROMBIN TIME (PT) IN PPP BY COAGULATION ASSAY EXTERNAL: ABNORMAL SECONDS

## 2024-05-02 NOTE — PROGRESS NOTES
SUBJECTIVE    Emma Villela is a 69 y.o. female who is enrolled in the [facility] for anticoagulation management.     Referring Provider: Dr. Ti Nolan   INR Goal: 2.0-3.0  Indication: Atrial Fibrillation/Atrial Flutter    OBJECTIVE    Lab Results   Component Value Date    INR 3.20 (A) 04/25/2024    INR 2.80 (N) 04/18/2024    INR 2.90 (N) 04/11/2024    PROTIME 21.3 (H) 01/21/2024    PROTIME 18.2 (H) 01/20/2024    PROTIME 16.2 (H) 01/19/2024       ASSESSMENT AND PLAN     Current INR is Therapeutic at 2.5. Previous INR was Supratherapeutic at 3.2. Plan to continue current warfarin regimen of 7.5 mg every day. Follow up with INR check at home in one week.     Katja Chisholm, PharmD

## 2024-05-03 DIAGNOSIS — E83.42 HYPOMAGNESEMIA: ICD-10-CM

## 2024-05-03 RX ORDER — LANOLIN ALCOHOL/MO/W.PET/CERES
CREAM (GRAM) TOPICAL
Qty: 180 TABLET | Refills: 1 | Status: SHIPPED | OUTPATIENT
Start: 2024-05-03

## 2024-05-06 ENCOUNTER — OFFICE VISIT (OUTPATIENT)
Dept: PULMONOLOGY | Facility: CLINIC | Age: 69
End: 2024-05-06
Payer: MEDICARE

## 2024-05-06 VITALS
OXYGEN SATURATION: 94 % | BODY MASS INDEX: 51.28 KG/M2 | WEIGHT: 293 LBS | HEART RATE: 97 BPM | SYSTOLIC BLOOD PRESSURE: 147 MMHG | DIASTOLIC BLOOD PRESSURE: 80 MMHG

## 2024-05-06 DIAGNOSIS — E66.01 MORBID OBESITY (MULTI): ICD-10-CM

## 2024-05-06 DIAGNOSIS — I50.9 CHF (NYHA CLASS III, ACC/AHA STAGE C) (MULTI): ICD-10-CM

## 2024-05-06 DIAGNOSIS — J43.2 CENTRILOBULAR EMPHYSEMA (MULTI): Primary | ICD-10-CM

## 2024-05-06 DIAGNOSIS — J96.11 CHRONIC RESPIRATORY FAILURE WITH HYPOXIA (MULTI): ICD-10-CM

## 2024-05-06 DIAGNOSIS — G47.33 OBSTRUCTIVE SLEEP APNEA: ICD-10-CM

## 2024-05-06 PROCEDURE — 3077F SYST BP >= 140 MM HG: CPT | Performed by: NURSE PRACTITIONER

## 2024-05-06 PROCEDURE — 1157F ADVNC CARE PLAN IN RCRD: CPT | Performed by: NURSE PRACTITIONER

## 2024-05-06 PROCEDURE — 1036F TOBACCO NON-USER: CPT | Performed by: NURSE PRACTITIONER

## 2024-05-06 PROCEDURE — 99214 OFFICE O/P EST MOD 30 MIN: CPT | Performed by: NURSE PRACTITIONER

## 2024-05-06 PROCEDURE — 1159F MED LIST DOCD IN RCRD: CPT | Performed by: NURSE PRACTITIONER

## 2024-05-06 PROCEDURE — 1160F RVW MEDS BY RX/DR IN RCRD: CPT | Performed by: NURSE PRACTITIONER

## 2024-05-06 PROCEDURE — 3079F DIAST BP 80-89 MM HG: CPT | Performed by: NURSE PRACTITIONER

## 2024-05-06 RX ORDER — BUDESONIDE AND FORMOTEROL FUMARATE DIHYDRATE 160; 4.5 UG/1; UG/1
2 AEROSOL RESPIRATORY (INHALATION) 2 TIMES DAILY
Qty: 10.2 G | Refills: 11 | Status: SHIPPED | OUTPATIENT
Start: 2024-05-06

## 2024-05-06 NOTE — PATIENT INSTRUCTIONS
- Continue to use your CPAP every night with sleep w/ oxygen   - Continue with your oxygen  3 L    - Continue to use your inhalers    Follow-up in 6 months and bring your cpap

## 2024-05-06 NOTE — PROGRESS NOTES
Subjective   Patient ID: Emma Villela is a 69 y.o. female who presents for Follow-up (Fuv 6 mo no testing).  HPI  7/1/19: 64-year-old  woman with severe obstructive sleep apnea (.9, 10/7/13) on CPAP 12 with medium air fit F10 fullface mask, C-Flex 3 and 3 L oxygen bleed, and allergic rhinitis, diastolic heart failure and severe COPD, recently admitted to [facility] from June 4 to Melania 15, 2019 for acute respiratory failure with hypoxia, acute and chronic diastolic heart failure, COPD exacerbation and pneumonia. The patient was discharged on a prednisone taper and 5 L of oxygen by nasal cannula. Her pulse ox on 5 L of oxygen at home is now 96-98% and wants to start decreasing her oxygen. She feels much better. She is able to walk more than 500 feet with a walker before she gets short of breath. No cough or wheezing. Decreased lower extremity edema. She has not needed her rescue inhaler. Compliant with Symbicort. No nasal complaints. She uses C Pap 8-9 hours every night. No snoring on C Pap. Feels rested with sleep. Denies excessive daytime sleepiness with an [place] sleepiness score of 1/24. She was able to get a new C Pap machine and a portable oxygen concentrator.    01/09/2020: Since her last visit she's been doing well breathing is stable. She uses 2 L of oxygen with her CPAP. She uses 2 L of oxygen continuously. She received a letter from Medicare that she will need recertified for her oxygen use. She will need a 6 minute walk. She uses Symbicort and rarely uses her pro-air. I did talk to her today about pulmonary rehabilitation, she is willing to think about pulmonary rehabilitation in April or May when the weather is better. For her oxygen recertification she does use [facility] in [place]. I did take her off of 02 today in the office and at rest she stayed at 93%.     11/12/2020: She is here today for follow-up. Her breathing is stable. She uses her Symbicort and her albuterol regularly.  She is compliant with 3 L of oxygen. Uses her CPAP regularly with 3 L oxygen bleed. Patient had fluid overload and required a chest tube in June. A few weeks ago she saw her cardiologist who prescribed an antibiotic because patient had residual chest sounds. Today she is clear. She has her normal swelling in her legs. Her  is with her today and states that she was started on another water pill patient complains of spending most of her day in the bathroom. She had surgery in the spring for an aortic aneurysm.    01/20/2022: She is being seen today for follow-up. Patient has not been using her Symbicort regularly. She says that she feels really well so she did not think she needed it. We went over what a maintenance medication is today and the importance of using it daily. She rarely ever uses her ProAir. She did have a AAA repair in the fall and is recovered from it and feeling okay. She does continue on her water pills and still complains that she uses the restroom quite a bit especially at night. She does use her CPAP at night with 3 L.    01/04/23 she is here today for follow-up. She continues to use her oxygen at 2 L during the day and 3 L with her CPAP. She is 1 her percent compliant with her CPAP using it 10 hours a night AHI is 3. She uses Symbicort but does not always remember to use it regularly. We did talk today about the importance of using her medications. She rarely ever uses her Ventolin. She has not had a PFT in a couple of years I would like her to have 1 before her next visit. She quit smoking about 2-1/2 years ago she is eligible for a low-dose screening CT I would like her to have 1 and she is agreeable.    05/17/23 she is here today for follow-up. She had a new PFT PFT does show an improvement in her overall lung function. Her FEV1 went from 42% to 55%. Patient is compliant with her Symbicort she uses her albuterol as needed. She is using oxygen at 2 to 3 L continuously. Uses her CPAP at  night. Its been about 3 years now since she quit smoking. 10 minutes spent preparing patient's chart. 15 minutes spent with patient answering questions and determining care. 10 minutes spent charting and ordering tests and medications     05/06/24 She is here today for follow-up.  She is doing well.  She continues on Symbicort.  She is using 3 L of oxygen regularly.  She uses her CPAP nightly with 3 L.  Encourage patient to be active.  She had a CT of her abdomen which does show a 7 x 5 mm nodule but this is stable since 2019.    Previous pulmonary history:   She has no history of recurrent infections, or lung disease as a child. She was previously told she hasCOPD . She currently is on supplemental oxygen. She has never been to pulmonary rehab. Does recall having AECOPD requiring antibiotics or prednisone.     Inhalers/nebulized medications: Symbicort, Albuterol      Hospitalization History:  She has not been hospitalized over the last year for breathing related problem.     Sleep history:  Uses CPAP  Denies snoring, apneas, feeling tired during the day or taking naps during the day. Admits to feeling tired during the day.    Comorbidities:  AFIB  HTN  CHF       SH:  smoking: former smoker  drinking: none  illicit drug use: none     Occupation: (Full questionnaire on exposures obtained, discussed with the patient and scanned to EMR)  No known exposure to asbestos, silica or beryllium     Family History:  No family history of lung diseases or cancer     Imaging history: (I have personally reviewed the imaging below)  6/19: CXR - tiny bilateral pleural effusions and atelectasis      PFTs:  2/25/19 // -> FEV1/FVC ratio .53 /FEV1 41% (no BD response)/FVC 60% /DLCO 46% /TLC/RV to TLC ratio .53     6 MWTs: None on record     Lung biopsy: None on record     Echo:  5/19 -> Normal EF 55-60% , left atrium moderately dilated      Labs:  : Hb 12.1 , Eos <250, Bicarb 37 , Creat 0.66       Review of Systems   All other systems  reviewed and are negative.      Objective   Physical Exam  Vitals reviewed.   Constitutional:       Appearance: Normal appearance.   HENT:      Head: Normocephalic.   Cardiovascular:      Rate and Rhythm: Normal rate.   Pulmonary:      Effort: Pulmonary effort is normal.      Breath sounds: Normal breath sounds.   Neurological:      General: No focal deficit present.      Mental Status: She is alert and oriented to person, place, and time.   Psychiatric:         Mood and Affect: Mood normal.         Behavior: Behavior normal.         Assessment/Plan     #. COPD, Stage III, Group B, markedly worsened lung function in 9 years, controlled   - Continue with Symbicort regularly 160/4.5, 2 puffs twice daily with a spacer. The patient would benefit from pulmonary rehabilitation and qualifies by PFT criteria but wants to wait until Spring to see what happens with COVID   - Symbicort needs refilled, she has only been using it on occasion, we talked today about why we use a maintenance medication and the importance of it   - She needs a new PFT it has been a couple of years.  05/17/23 new PFT actually showed improvement in her lung function. She continues on Symbicort. We did talk about Breztri today but Symbicort is working well. She uses her rescue inhaler only on occasion.  05/06/24 She continues on Symbicort. Rarely using her albuterol.      #. Severe obstructive sleep apnea, doing well with C Pap with good compliance and good control of sleep symptoms   - Continue with CPAP every night with sleep. with 3L oxygen bleed   - still using cpap every night with 3L, is up a lot because of her water pill   05/06/24 still using cpap with 3L. She did not bring her cpap today but will next visit     #. Chronic respiratory failure with hypoxia secondary to CHF and COPD, compliant with oxygen   - Advised patient to titrate oxygen down to maintain pulse ox between 90-92%. She is at 2 L portable oxygen continuously at baseline with 3 L  oxygen bleed at night.   - Insurance is requiring a recertification, she needs a 6 min walk, she uses [facility] in [place]   - She had a walking testing showing she needs 3L with exertion   - Using 2L all the time   05/06/24 she is using 3L continuously encouraged her to keep her oxygen above 90%    #. Chronic diastolic heart failure   - Continue with cardiac medications.   - Continue follow up with Cardiology     #. Allergic rhinitis, seasonal, controlled   - During allergy season, take OTC Nasacort nasal spray 2 sprays in each nostril daily and loratadine 10 mg daily.  05/06/24 she has not needed her nasal spray     #. Morbid obesity   - Encouraged weight loss with diet and exercise.    #Low-dose chest CT screen   - She is eligible because she is between the ages of 50 and 80 with a 15-year pack history   - She quit smoking 2-1/2 years ago      - Continue to use your CPAP every night with sleep w/ oxygen   - Continue with your oxygen  3 L    - Continue to use your inhalers    Follow-up in 6 months and bring your cpap                LUIS Valdes-CNP 05/06/24 11:17 AM

## 2024-05-09 ENCOUNTER — ANTICOAGULATION - WARFARIN VISIT (OUTPATIENT)
Dept: PHARMACY | Facility: HOSPITAL | Age: 69
End: 2024-05-09
Payer: MEDICARE

## 2024-05-09 DIAGNOSIS — I48.91 ATRIAL FIBRILLATION, UNSPECIFIED TYPE (MULTI): Primary | ICD-10-CM

## 2024-05-09 NOTE — PROGRESS NOTES
SUBJECTIVE    Emma Villela is a 69 y.o. female who is enrolled in the [facility] for anticoagulation management.     Referring Provider: Dr. Ti Nolan   INR Goal: 2.0-3.0  Indication: Atrial Fibrillation/Atrial Flutter    OBJECTIVE    Lab Results   Component Value Date    INR 2.50 (N) 05/02/2024    INR 3.20 (A) 04/25/2024    INR 2.80 (N) 04/18/2024    PROTIME 21.3 (H) 01/21/2024    PROTIME 18.2 (H) 01/20/2024    PROTIME 16.2 (H) 01/19/2024       ASSESSMENT AND PLAN     Current INR is Therapeutic at 2.6. Previous INR was Therapeutic at 2.5. Plan to continue current warfarin regimen of 7.5 mg every day. Follow up with INR check at home in one week.     Katja Chisholm, PharmD

## 2024-05-13 DIAGNOSIS — E89.0 HYPOTHYROIDISM, POSTSURGICAL: ICD-10-CM

## 2024-05-13 DIAGNOSIS — I10 BENIGN ESSENTIAL HYPERTENSION: ICD-10-CM

## 2024-05-13 RX ORDER — LEVOTHYROXINE SODIUM 50 UG/1
TABLET ORAL
Qty: 90 TABLET | Refills: 0 | Status: SHIPPED | OUTPATIENT
Start: 2024-05-13

## 2024-05-13 RX ORDER — LOSARTAN POTASSIUM 100 MG/1
100 TABLET ORAL DAILY
Qty: 90 TABLET | Refills: 0 | Status: SHIPPED | OUTPATIENT
Start: 2024-05-13

## 2024-05-16 ENCOUNTER — ANTICOAGULATION - WARFARIN VISIT (OUTPATIENT)
Dept: PHARMACY | Facility: HOSPITAL | Age: 69
End: 2024-05-16
Payer: MEDICARE

## 2024-05-16 DIAGNOSIS — I48.91 ATRIAL FIBRILLATION, UNSPECIFIED TYPE (MULTI): Primary | ICD-10-CM

## 2024-05-16 NOTE — PROGRESS NOTES
SUBJECTIVE    Emma Villela is a 69 y.o. female who is enrolled in the [facility] for anticoagulation management.     Referring Provider: Dr. Ti Nolan   INR Goal: 2.0-3.0  Indication: Atrial Fibrillation/Atrial Flutter    OBJECTIVE    Lab Results   Component Value Date    INR 2.60 (N) 05/09/2024    INR 2.50 (N) 05/02/2024    INR 3.20 (A) 04/25/2024    PROTIME 21.3 (H) 01/21/2024    PROTIME 18.2 (H) 01/20/2024    PROTIME 16.2 (H) 01/19/2024       ASSESSMENT AND PLAN     Current INR is Therapeutic at 3.0. Previous INR was Therapeutic at 2.6. Plan to continue current warfarin regimen of 7.5 mg every day. Follow up with INR check at home in one week.     Katja Chisholm, PharmD

## 2024-05-23 ENCOUNTER — ANTICOAGULATION - WARFARIN VISIT (OUTPATIENT)
Dept: PHARMACY | Facility: HOSPITAL | Age: 69
End: 2024-05-23
Payer: MEDICARE

## 2024-05-23 DIAGNOSIS — I48.91 ATRIAL FIBRILLATION, UNSPECIFIED TYPE (MULTI): Primary | ICD-10-CM

## 2024-05-23 NOTE — PROGRESS NOTES
SUBJECTIVE    Emma Villela is a 69 y.o. female who is enrolled in the [facility] for anticoagulation management.     Referring Provider: Dr. Ti Nolan  INR Goal: 2.0-3.0  Indication: Atrial Fibrillation/Atrial Flutter    OBJECTIVE    Lab Results   Component Value Date    INR 3.00 (N) 05/16/2024    INR 2.60 (N) 05/09/2024    INR 2.50 (N) 05/02/2024    PROTIME 21.3 (H) 01/21/2024    PROTIME 18.2 (H) 01/20/2024    PROTIME 16.2 (H) 01/19/2024       ASSESSMENT AND PLAN     Current INR is Therapeutic at 2.1. Previous INR was Therapeutic at 3.0. Plan to continue current warfarin regimen of 7.5 mg every day. Follow up with INR check at home in one week.     Katja Chisholm, PharmD

## 2024-05-30 ENCOUNTER — ANTICOAGULATION - WARFARIN VISIT (OUTPATIENT)
Dept: PHARMACY | Facility: HOSPITAL | Age: 69
End: 2024-05-30
Payer: MEDICARE

## 2024-05-30 NOTE — PROGRESS NOTES
SUBJECTIVE    Emma Villela is a 69 y.o. female who is enrolled in the [facility] for anticoagulation management.     Referring Provider: Dr. Ti Nolan   INR Goal: 2.0-3.0  Indication: Atrial Fibrillation/Atrial Flutter    OBJECTIVE    Lab Results   Component Value Date    INR 2.10 (N) 05/23/2024    INR 3.00 (N) 05/16/2024    INR 2.60 (N) 05/09/2024    PROTIME 21.3 (H) 01/21/2024    PROTIME 18.2 (H) 01/20/2024    PROTIME 16.2 (H) 01/19/2024       ASSESSMENT AND PLAN     Current INR is Therapeutic at 2.4. Previous INR was Therapeutic at 2.1. Plan to continue current warfarin regimen of 7.5 mg every day. Follow up with INR check at home in one week.     Katja Chisholm, PharmD

## 2024-06-06 ENCOUNTER — ANTICOAGULATION - WARFARIN VISIT (OUTPATIENT)
Dept: PHARMACY | Facility: HOSPITAL | Age: 69
End: 2024-06-06
Payer: MEDICARE

## 2024-06-06 NOTE — PROGRESS NOTES
SUBJECTIVE    Emma Villela is a 69 y.o. female who is enrolled in the [facility] for anticoagulation management.     Referring Provider: Dr. Ti Nolan  INR Goal: 2.0-3.0  Indication: Atrial Fibrillation/Atrial Flutter    OBJECTIVE    Lab Results   Component Value Date    INR 2.40 (N) 05/30/2024    INR 2.10 (N) 05/23/2024    INR 3.00 (N) 05/16/2024    PROTIME 21.3 (H) 01/21/2024    PROTIME 18.2 (H) 01/20/2024    PROTIME 16.2 (H) 01/19/2024       ASSESSMENT AND PLAN     Current INR is Therapeutic at 2.4. Previous INR was Therapeutic at 2.4. Plan to continue current warfarin regimen of 7.5 mg every day. Follow up with INR check at home in one week.     Katja Chisholm, PharmD

## 2024-06-12 ENCOUNTER — LAB (OUTPATIENT)
Dept: LAB | Facility: LAB | Age: 69
End: 2024-06-12
Payer: MEDICARE

## 2024-06-12 ENCOUNTER — APPOINTMENT (OUTPATIENT)
Dept: PRIMARY CARE | Facility: CLINIC | Age: 69
End: 2024-06-12
Payer: MEDICARE

## 2024-06-12 VITALS
OXYGEN SATURATION: 95 % | TEMPERATURE: 97.4 F | DIASTOLIC BLOOD PRESSURE: 86 MMHG | SYSTOLIC BLOOD PRESSURE: 124 MMHG | HEART RATE: 107 BPM

## 2024-06-12 DIAGNOSIS — Z00.00 ROUTINE GENERAL MEDICAL EXAMINATION AT HEALTH CARE FACILITY: ICD-10-CM

## 2024-06-12 DIAGNOSIS — E66.01 MORBID OBESITY WITH BMI OF 50.0-59.9, ADULT (MULTI): ICD-10-CM

## 2024-06-12 DIAGNOSIS — Z79.899 HIGH RISK MEDICATION USE: ICD-10-CM

## 2024-06-12 DIAGNOSIS — E78.00 HYPERCHOLESTEREMIA: ICD-10-CM

## 2024-06-12 DIAGNOSIS — Z12.31 ENCOUNTER FOR SCREENING MAMMOGRAM FOR MALIGNANT NEOPLASM OF BREAST: ICD-10-CM

## 2024-06-12 DIAGNOSIS — Z11.59 NEED FOR HEPATITIS C SCREENING TEST: ICD-10-CM

## 2024-06-12 DIAGNOSIS — E53.8 VITAMIN B12 DEFICIENCY: ICD-10-CM

## 2024-06-12 DIAGNOSIS — I50.9 CHF (NYHA CLASS III, ACC/AHA STAGE C) (MULTI): ICD-10-CM

## 2024-06-12 DIAGNOSIS — E89.0 HYPOTHYROIDISM, POSTSURGICAL: ICD-10-CM

## 2024-06-12 DIAGNOSIS — G47.33 OBSTRUCTIVE SLEEP APNEA: ICD-10-CM

## 2024-06-12 DIAGNOSIS — Z12.31 ENCOUNTER FOR SCREENING MAMMOGRAM FOR BREAST CANCER: ICD-10-CM

## 2024-06-12 DIAGNOSIS — R53.83 OTHER FATIGUE: ICD-10-CM

## 2024-06-12 DIAGNOSIS — I10 HYPERTENSION, UNSPECIFIED TYPE: ICD-10-CM

## 2024-06-12 DIAGNOSIS — E83.42 HYPOMAGNESEMIA: ICD-10-CM

## 2024-06-12 DIAGNOSIS — R42 VERTIGO: Primary | ICD-10-CM

## 2024-06-12 DIAGNOSIS — J96.11 CHRONIC RESPIRATORY FAILURE WITH HYPOXIA (MULTI): ICD-10-CM

## 2024-06-12 DIAGNOSIS — E55.9 VITAMIN D DEFICIENCY, UNSPECIFIED: ICD-10-CM

## 2024-06-12 DIAGNOSIS — E87.6 HYPOKALEMIA: ICD-10-CM

## 2024-06-12 DIAGNOSIS — I10 BENIGN ESSENTIAL HYPERTENSION: ICD-10-CM

## 2024-06-12 LAB
25(OH)D3 SERPL-MCNC: 72 NG/ML (ref 30–100)
ALBUMIN SERPL BCP-MCNC: 3.7 G/DL (ref 3.4–5)
ALP SERPL-CCNC: 81 U/L (ref 33–136)
ALT SERPL W P-5'-P-CCNC: 10 U/L (ref 7–45)
ANION GAP SERPL CALC-SCNC: 11 MMOL/L (ref 10–20)
AST SERPL W P-5'-P-CCNC: 17 U/L (ref 9–39)
BASOPHILS # BLD AUTO: 0.05 X10*3/UL (ref 0–0.1)
BASOPHILS NFR BLD AUTO: 0.7 %
BILIRUB SERPL-MCNC: 1.6 MG/DL (ref 0–1.2)
BUN SERPL-MCNC: 19 MG/DL (ref 6–23)
CALCIUM SERPL-MCNC: 11.2 MG/DL (ref 8.6–10.3)
CHLORIDE SERPL-SCNC: 98 MMOL/L (ref 98–107)
CHOLEST SERPL-MCNC: 133 MG/DL (ref 0–199)
CHOLESTEROL/HDL RATIO: 5.2
CO2 SERPL-SCNC: 35 MMOL/L (ref 21–32)
CREAT SERPL-MCNC: 1.42 MG/DL (ref 0.5–1.05)
EGFRCR SERPLBLD CKD-EPI 2021: 40 ML/MIN/1.73M*2
EOSINOPHIL # BLD AUTO: 0.1 X10*3/UL (ref 0–0.7)
EOSINOPHIL NFR BLD AUTO: 1.5 %
ERYTHROCYTE [DISTWIDTH] IN BLOOD BY AUTOMATED COUNT: 15.2 % (ref 11.5–14.5)
GLUCOSE SERPL-MCNC: 115 MG/DL (ref 74–99)
HCT VFR BLD AUTO: 32.7 % (ref 36–46)
HCV AB SER QL: NONREACTIVE
HDLC SERPL-MCNC: 25.8 MG/DL
HGB BLD-MCNC: 10.2 G/DL (ref 12–16)
IMM GRANULOCYTES # BLD AUTO: 0.06 X10*3/UL (ref 0–0.7)
IMM GRANULOCYTES NFR BLD AUTO: 0.9 % (ref 0–0.9)
LDLC SERPL CALC-MCNC: 81 MG/DL
LYMPHOCYTES # BLD AUTO: 0.7 X10*3/UL (ref 1.2–4.8)
LYMPHOCYTES NFR BLD AUTO: 10.2 %
MCH RBC QN AUTO: 30.3 PG (ref 26–34)
MCHC RBC AUTO-ENTMCNC: 31.2 G/DL (ref 32–36)
MCV RBC AUTO: 97 FL (ref 80–100)
MONOCYTES # BLD AUTO: 0.64 X10*3/UL (ref 0.1–1)
MONOCYTES NFR BLD AUTO: 9.3 %
NEUTROPHILS # BLD AUTO: 5.31 X10*3/UL (ref 1.2–7.7)
NEUTROPHILS NFR BLD AUTO: 77.4 %
NON HDL CHOLESTEROL: 107 MG/DL (ref 0–149)
NRBC BLD-RTO: 0 /100 WBCS (ref 0–0)
PLATELET # BLD AUTO: 163 X10*3/UL (ref 150–450)
POTASSIUM SERPL-SCNC: 4.3 MMOL/L (ref 3.5–5.3)
PROT SERPL-MCNC: 7 G/DL (ref 6.4–8.2)
RBC # BLD AUTO: 3.37 X10*6/UL (ref 4–5.2)
SODIUM SERPL-SCNC: 140 MMOL/L (ref 136–145)
T4 FREE SERPL-MCNC: 1.44 NG/DL (ref 0.61–1.12)
TRIGL SERPL-MCNC: 129 MG/DL (ref 0–149)
TSH SERPL-ACNC: 6.39 MIU/L (ref 0.44–3.98)
VIT B12 SERPL-MCNC: 291 PG/ML (ref 211–911)
VLDL: 26 MG/DL (ref 0–40)
WBC # BLD AUTO: 6.9 X10*3/UL (ref 4.4–11.3)

## 2024-06-12 PROCEDURE — 1123F ACP DISCUSS/DSCN MKR DOCD: CPT | Performed by: INTERNAL MEDICINE

## 2024-06-12 PROCEDURE — G0439 PPPS, SUBSEQ VISIT: HCPCS | Performed by: INTERNAL MEDICINE

## 2024-06-12 PROCEDURE — 1170F FXNL STATUS ASSESSED: CPT | Performed by: INTERNAL MEDICINE

## 2024-06-12 PROCEDURE — 1160F RVW MEDS BY RX/DR IN RCRD: CPT | Performed by: INTERNAL MEDICINE

## 2024-06-12 PROCEDURE — 86803 HEPATITIS C AB TEST: CPT

## 2024-06-12 PROCEDURE — 99214 OFFICE O/P EST MOD 30 MIN: CPT | Performed by: INTERNAL MEDICINE

## 2024-06-12 PROCEDURE — 3074F SYST BP LT 130 MM HG: CPT | Performed by: INTERNAL MEDICINE

## 2024-06-12 PROCEDURE — 82306 VITAMIN D 25 HYDROXY: CPT

## 2024-06-12 PROCEDURE — 1158F ADVNC CARE PLAN TLK DOCD: CPT | Performed by: INTERNAL MEDICINE

## 2024-06-12 PROCEDURE — 82607 VITAMIN B-12: CPT

## 2024-06-12 PROCEDURE — 1157F ADVNC CARE PLAN IN RCRD: CPT | Performed by: INTERNAL MEDICINE

## 2024-06-12 PROCEDURE — 36415 COLL VENOUS BLD VENIPUNCTURE: CPT

## 2024-06-12 PROCEDURE — 3079F DIAST BP 80-89 MM HG: CPT | Performed by: INTERNAL MEDICINE

## 2024-06-12 PROCEDURE — 3008F BODY MASS INDEX DOCD: CPT | Performed by: INTERNAL MEDICINE

## 2024-06-12 PROCEDURE — 1159F MED LIST DOCD IN RCRD: CPT | Performed by: INTERNAL MEDICINE

## 2024-06-12 PROCEDURE — 1036F TOBACCO NON-USER: CPT | Performed by: INTERNAL MEDICINE

## 2024-06-12 RX ORDER — FUROSEMIDE 40 MG/1
40 TABLET ORAL 2 TIMES DAILY
Qty: 180 TABLET | Refills: 1 | Status: SHIPPED | OUTPATIENT
Start: 2024-06-12

## 2024-06-12 RX ORDER — LEVOTHYROXINE SODIUM 50 UG/1
TABLET ORAL
Qty: 90 TABLET | Refills: 1 | Status: SHIPPED | OUTPATIENT
Start: 2024-06-12 | End: 2024-06-12

## 2024-06-12 RX ORDER — LEVOTHYROXINE SODIUM 200 UG/1
TABLET ORAL
Qty: 90 TABLET | Refills: 1 | Status: SHIPPED | OUTPATIENT
Start: 2024-06-12 | End: 2024-06-12 | Stop reason: SDUPTHER

## 2024-06-12 RX ORDER — LEVOTHYROXINE SODIUM 300 UG/1
TABLET ORAL
Qty: 90 TABLET | Refills: 1 | Status: SHIPPED | OUTPATIENT
Start: 2024-06-12 | End: 2024-06-13 | Stop reason: SDUPTHER

## 2024-06-12 ASSESSMENT — ENCOUNTER SYMPTOMS
WHEEZING: 0
ABDOMINAL PAIN: 0
BRUISES/BLEEDS EASILY: 0
FATIGUE: 1
DIFFICULTY URINATING: 0
BLOOD IN STOOL: 0
ARTHRALGIAS: 0
DIZZINESS: 0
SORE THROAT: 0
HEADACHES: 0
COUGH: 0
PALPITATIONS: 0
FEVER: 0
SINUS PAIN: 0
DIARRHEA: 0
UNEXPECTED WEIGHT CHANGE: 0

## 2024-06-12 ASSESSMENT — PATIENT HEALTH QUESTIONNAIRE - PHQ9
2. FEELING DOWN, DEPRESSED OR HOPELESS: NOT AT ALL
1. LITTLE INTEREST OR PLEASURE IN DOING THINGS: NOT AT ALL
SUM OF ALL RESPONSES TO PHQ9 QUESTIONS 1 AND 2: 0

## 2024-06-12 ASSESSMENT — ACTIVITIES OF DAILY LIVING (ADL)
BATHING: INDEPENDENT
GROCERY_SHOPPING: INDEPENDENT
MANAGING_FINANCES: INDEPENDENT
DRESSING: INDEPENDENT
DOING_HOUSEWORK: INDEPENDENT
TAKING_MEDICATION: INDEPENDENT

## 2024-06-12 NOTE — PROGRESS NOTES
Subjective   Reason for Visit: Emma Villela is an 69 y.o. female here for a Medicare Wellness visit.     Past Medical, Surgical, and Family History reviewed and updated in chart.    Reviewed all medications by prescribing practitioner or clinical pharmacist (such as prescriptions, OTCs, herbal therapies and supplements) and documented in the medical record.    Annual preventive visit  - Vaccinations reviewed and up-to-date  - Needs a screening mammogram  - Screening for depression compensated  I spent 15 minutes obtaining and discussing depression screening using PHQ-2 questions with results documented in the chart.  -Advance of directive reviewed  - Needs to complete blood work    Follow-up  -Increase swelling in the neck underlying history of thyroid nodule need to follow-up thyroid ultrasound thyroid function test for further recommendation  - Vertigo fatigue tiredness usually when she stands continues to be determined need to proceed with complete blood work  Resume meclizine until further recommendation  -Chronic respiratory failure continues 2 L oxygen  - Obstructive sleep apnea continue CPAP  -- Hypertension controlled continue with carvedilol half tablet twice a day patient controlled no need to go to isosorbide at this time continue monitoring closely  - afib (s/p ablation, on warfarin), COPD (on 2L continuous oxygen), morbid obesity, CVI, HTN, hypothyroidism and AAA (s/p EVAR 2016) now s/p right renal artery stenting (6x59m [place] VBX), aortic stent graft placement ([id number] [place] aortic cuff x2), bilateral iliac stent graft placement, and right hypogastric artery coil embolization on 1/17/2024 by Dr. Walton and Dr. Hernandez. Overnight on POD0/POD1 she developed afib RVR with hypotension requiring multiple IV metoprolol  Is still having endoleak awaiting follow-up 6 months ultrasound.  -Atrial fibrillation controlled to continue with carvedilol blood pressure controlled heart rate controlled  - Atrial  fibrillation patient seen by Coumadin clinic review 1 further follow-up INR closely  - Hypothyroid seen by endocrinology now takes total to 250 mcg's daily follow-up thyroid function test in 3 months  - Hypomagnesemia start a magnesium tablet daily  - Vitamin D deficiency need to start on vitamin D 5000 daily  - Hypokalemia continue with potassium 40 mg twice a day follow-up BMP for further recommendation  -CHF compensated continue low-salt diet  --Chronic respiratory failure and COPD continues 2 L oxygen continue with current inhaler recent pulmonary function test no change  Follow-up 3 months          Patient Care Team:  Ti Nolan MD as PCP - General  Ti Nolan MD as PCP - [facility] Attributed Provider     Review of Systems   Constitutional:  Positive for fatigue. Negative for fever and unexpected weight change.   HENT:  Negative for congestion, ear discharge, ear pain, mouth sores, sinus pain and sore throat.    Eyes:  Negative for visual disturbance.   Respiratory:  Negative for cough and wheezing.    Cardiovascular:  Negative for chest pain, palpitations and leg swelling.   Gastrointestinal:  Negative for abdominal pain, blood in stool and diarrhea.   Genitourinary:  Negative for difficulty urinating.   Musculoskeletal:  Negative for arthralgias.   Skin:  Negative for rash.   Neurological:  Negative for dizziness and headaches.   Hematological:  Does not bruise/bleed easily.   Psychiatric/Behavioral:  Negative for behavioral problems.    All other systems reviewed and are negative.      Objective   Vitals:  /86   Pulse 107   Temp 36.3 °C (97.4 °F)   SpO2 95%     Lab Results   Component Value Date    WBC 5.2 01/21/2024    HGB 9.5 (L) 01/21/2024    HCT 30.5 (L) 01/21/2024     (L) 01/21/2024    CHOL 158 04/04/2023    TRIG 149 04/04/2023    HDL 31.2 (A) 04/04/2023    ALT 12 04/04/2023    AST 18 04/04/2023     01/21/2024    K 3.4 (L) 01/21/2024    CL 98 01/21/2024    CREATININE  0.75 01/21/2024    BUN 14 01/21/2024    CO2 35 (H) 01/21/2024    TSH 4.75 (H) 07/20/2023    INR 2.40 (N) 06/06/2024    HGBA1C 5.5 12/28/2023     par   Physical Exam  Vitals and nursing note reviewed.   Constitutional:       Appearance: Normal appearance.   HENT:      Head: Normocephalic.      Nose: Nose normal.   Eyes:      Conjunctiva/sclera: Conjunctivae normal.      Pupils: Pupils are equal, round, and reactive to light.   Cardiovascular:      Rate and Rhythm: Regular rhythm.   Pulmonary:      Effort: Pulmonary effort is normal.      Breath sounds: Normal breath sounds.   Abdominal:      General: Abdomen is flat.      Palpations: Abdomen is soft.   Musculoskeletal:      Cervical back: Neck supple.      Right lower leg: Edema present.      Left lower leg: Edema present.   Skin:     General: Skin is warm.   Neurological:      General: No focal deficit present.      Mental Status: She is oriented to person, place, and time.   Psychiatric:         Mood and Affect: Mood normal.         Assessment/Plan   Problem List Items Addressed This Visit       Hypothyroidism, postsurgical    Relevant Medications    levothyroxine (Synthroid, Levoxyl) 50 mcg tablet    levothyroxine (Synthroid, Levoxyl) 200 mcg tablet    Other Relevant Orders    US thyroid    Benign essential hypertension    CHF (NYHA class III, ACC/AHA stage C) (Multi)    Relevant Medications    furosemide (Lasix) 40 mg tablet    Chronic respiratory failure with hypoxia (Multi)    Hypokalemia    Obstructive sleep apnea    Hypomagnesemia     Other Visit Diagnoses       Vertigo    -  Primary    Encounter for screening mammogram for breast cancer        Relevant Orders    BI mammo bilateral screening tomosynthesis    Need for hepatitis C screening test        Relevant Orders    Hepatitis C antibody    Routine general medical examination at health care facility        Encounter for screening mammogram for malignant neoplasm of breast        High risk medication use         Relevant Orders    CBC and Auto Differential    Hypercholesteremia        Relevant Orders    Lipid Panel    Hypertension, unspecified type        Relevant Orders    Comprehensive Metabolic Panel    Other fatigue        Relevant Orders    TSH with reflex to Free T4 if abnormal    Vitamin B12 deficiency        Relevant Orders    Vitamin B12    Vitamin D deficiency, unspecified        Relevant Orders    Vitamin D 25-Hydroxy,Total (for eval of Vitamin D levels)    Morbid obesity with BMI of 50.0-59.9, adult (Multi)            Annual preventive visit  - Vaccinations reviewed and up-to-date  - Needs a screening mammogram  - Screening for depression compensated  I spent 15 minutes obtaining and discussing depression screening using PHQ-2 questions with results documented in the chart.  -Advance of directive reviewed  - Needs to complete blood work    Follow-up  -Increase swelling in the neck underlying history of thyroid nodule need to follow-up thyroid ultrasound thyroid function test for further recommendation  - Vertigo fatigue tiredness usually when she stands continues to be determined need to proceed with complete blood work  Resume meclizine until further recommendation  -Chronic respiratory failure continues 2 L oxygen  - Obstructive sleep apnea continue CPAP  -- Hypertension controlled continue with carvedilol half tablet twice a day patient controlled no need to go to isosorbide at this time continue monitoring closely  - afib (s/p ablation, on warfarin), COPD (on 2L continuous oxygen), morbid obesity, CVI, HTN, hypothyroidism and AAA (s/p EVAR 2016) now s/p right renal artery stenting (6x59m [place] VBX), aortic stent graft placement ([id number] [place] aortic cuff x2), bilateral iliac stent graft placement, and right hypogastric artery coil embolization on 1/17/2024 by Dr. Walton and Dr. Hernandez. Overnight on POD0/POD1 she developed afib RVR with hypotension requiring multiple IV metoprolol  Is still having endoleak  awaiting follow-up 6 months ultrasound.  -Atrial fibrillation controlled to continue with carvedilol blood pressure controlled heart rate controlled  - Atrial fibrillation patient seen by Coumadin clinic review 1 further follow-up INR closely  - Hypothyroid seen by endocrinology now takes total to 250 mcg's daily follow-up thyroid function test in 3 months  - Hypomagnesemia start a magnesium tablet daily  - Vitamin D deficiency need to start on vitamin D 5000 daily  - Hypokalemia continue with potassium 40 mg twice a day follow-up BMP for further recommendation  -CHF compensated continue low-salt diet  --Chronic respiratory failure and COPD continues 2 L oxygen continue with current inhaler recent pulmonary function test no change  Follow-up 3 months

## 2024-06-12 NOTE — PROGRESS NOTES
Subjective   Patient ID: Emma Villela is a 69 y.o. female who presents for Medicare Annual Wellness Visit Subsequent, Vertigo (Was very bad yesterdayh), Med Refill, Hypothyroidism, and Congestive Heart Failure.    Med Refill    Congestive Heart Failure           Review of Systems    Objective   Lab Results   Component Value Date    HGBA1C 5.5 12/28/2023      /86   Pulse 107   Temp 36.3 °C (97.4 °F)   SpO2 95%     Physical Exam    Assessment/Plan   Emma was seen today for medicare annual wellness visit subsequent, vertigo, med refill, hypothyroidism and congestive heart failure.  Diagnoses and all orders for this visit:  CHF (NYHA class III, ACC/AHA stage C) (Multi)  -     furosemide (Lasix) 40 mg tablet; Take 1 tablet (40 mg) by mouth 2 times a day.  Hypothyroidism, postsurgical  -     levothyroxine (Synthroid, Levoxyl) 50 mcg tablet; take 1 tablet by mouth every day along with 200mcg tab to equal total daily dose 250mcg  -     levothyroxine (Synthroid, Levoxyl) 200 mcg tablet; Take one tablet by mouth every day along with a 50mcg tablet for a total daily dose of 250mcg  Other orders  -     Follow Up In Primary Care - Established

## 2024-06-13 ENCOUNTER — ANTICOAGULATION - WARFARIN VISIT (OUTPATIENT)
Dept: PHARMACY | Facility: HOSPITAL | Age: 69
End: 2024-06-13
Payer: MEDICARE

## 2024-06-13 DIAGNOSIS — E89.0 HYPOTHYROIDISM, POSTSURGICAL: ICD-10-CM

## 2024-06-13 DIAGNOSIS — I48.91 ATRIAL FIBRILLATION, UNSPECIFIED TYPE (MULTI): Primary | ICD-10-CM

## 2024-06-13 RX ORDER — LEVOTHYROXINE SODIUM 300 UG/1
TABLET ORAL
Qty: 90 TABLET | Refills: 1 | Status: SHIPPED | OUTPATIENT
Start: 2024-06-13

## 2024-06-13 NOTE — PROGRESS NOTES
SUBJECTIVE    Emma Villela is a 69 y.o. female who is enrolled in the [facility] for anticoagulation management.     Referring Provider: Dr. Ti Nolan   INR Goal: 2.0-3.0  Indication: Atrial Fibrillation/Atrial Flutter    OBJECTIVE    Lab Results   Component Value Date    INR 2.60 (N) 06/13/2024    INR 2.40 (N) 06/06/2024    INR 2.40 (N) 05/30/2024    PROTIME 21.3 (H) 01/21/2024    PROTIME 18.2 (H) 01/20/2024    PROTIME 16.2 (H) 01/19/2024       ASSESSMENT AND PLAN     Current INR is Therapeutic at 2.6. Previous INR was Therapeutic at 2.4. Plan to continue current warfarin regimen of 7.5 mg every day. Follow up with INR check at home in one week.     Katja Chisholm, PharmD

## 2024-06-20 ENCOUNTER — HOSPITAL ENCOUNTER (OUTPATIENT)
Dept: RADIOLOGY | Facility: HOSPITAL | Age: 69
Discharge: HOME | End: 2024-06-20
Payer: MEDICARE

## 2024-06-20 ENCOUNTER — ANTICOAGULATION - WARFARIN VISIT (OUTPATIENT)
Dept: PHARMACY | Facility: HOSPITAL | Age: 69
End: 2024-06-20
Payer: MEDICARE

## 2024-06-20 DIAGNOSIS — E89.0 HYPOTHYROIDISM, POSTSURGICAL: ICD-10-CM

## 2024-06-20 DIAGNOSIS — I48.91 ATRIAL FIBRILLATION, UNSPECIFIED TYPE (MULTI): Primary | ICD-10-CM

## 2024-06-20 DIAGNOSIS — I48.11 LONGSTANDING PERSISTENT ATRIAL FIBRILLATION (MULTI): ICD-10-CM

## 2024-06-20 LAB
INR IN PPP BY COAGULATION ASSAY EXTERNAL: 3.4 (ref 2–3)
PROTHROMBIN TIME (PT) IN PPP BY COAGULATION ASSAY EXTERNAL: ABNORMAL SECONDS

## 2024-06-20 PROCEDURE — 76536 US EXAM OF HEAD AND NECK: CPT | Performed by: RADIOLOGY

## 2024-06-20 PROCEDURE — 76536 US EXAM OF HEAD AND NECK: CPT

## 2024-06-20 NOTE — PROGRESS NOTES
Subjective     Emma Villela is a 69 y.o. female who presents for anticoagulation follow up.     Location: [facility]     Referring Provider: Dr. Ti Nolan   INR Goal: 2.0-3.0  Indication: Atrial Fibrillation/Atrial Flutter    Bleeding signs/symptoms: No  Bruising: No   Major bleeding event: No  Thrombosis signs/symptoms: No  Thromboembolic event: No  Missed doses: No  Extra doses: No  Medication changes: No  levothyroxine to 300 mcg and carvedilol cut in half.  Dietary changes: No  Change in health: No  Change in activity: No  Alcohol: No  Other concerns: No  Upcoming Surgeries: no  Parenteral anticoagulation: no    Current Outpatient Medications on File Prior to Visit   Medication Sig Dispense Refill    acetaminophen (Tylenol) 325 mg tablet Take 1 tablet (325 mg) by mouth every 4 hours if needed.      albuterol 90 mcg/actuation inhaler Inhale 2 puffs every 6 hours if needed.      amitriptyline (Elavil) 25 mg tablet TAKE ONE TABLET BY MOUTH AT BEDTIME 90 tablet 0    aspirin 81 mg EC tablet Take 1 tablet (81 mg) by mouth once daily.      atorvastatin (Lipitor) 40 mg tablet TAKE ONE TABLET BY MOUTH DAILY 90 tablet 1    budesonide-formoteroL (Symbicort) 160-4.5 mcg/actuation inhaler Inhale 2 puffs 2 times a day. 10.2 g 11    carvedilol (Coreg) 25 mg tablet Take 0.5 tablets (12.5 mg) by mouth 2 times a day with meals. Take with food. 180 tablet 0    cholecalciferol (Vitamin D-3) 125 MCG (5000 UT) capsule TAKE ONE CAPSULE BY MOUTH EVERY DAY 90 capsule 0    ferrous gluconate 324 (38 Fe) MG tablet TAKE ONE TABLET BY MOUTH DAILY 90 tablet 1    furosemide (Lasix) 40 mg tablet Take 1 tablet (40 mg) by mouth 2 times a day. 180 tablet 1    hydrALAZINE (Apresoline) 100 mg tablet TAKE ONE TABLET BY MOUTH THREE TIMES A  tablet 0    levothyroxine (Synthroid, Unithroid) 300 mcg tablet Take one tablet every day 90 tablet 1    losartan (Cozaar) 100 mg tablet TAKE ONE TABLET BY MOUTH  DAILY 90 tablet 0    magnesium oxide (Mag-Ox) 400 mg (241.3 mg magnesium) tablet TAKE ONE TABLET BY MOUTH TWICE A DAY AFTER MEALS 180 tablet 1    meclizine (Antivert) 25 mg tablet TAKE ONE TABLET BY MOUTH AT BEDTIME 30 tablet 2    metOLazone (Zaroxolyn) 5 mg tablet Take 1 tablet (5 mg) by mouth. Twice a week      oxybutynin XL (Ditropan-XL) 15 mg 24 hr tablet Take 1 tablet (15 mg) by mouth once daily.      pantoprazole (ProtoNix) 40 mg EC tablet TAKE ONE TABLET BY MOUTH EVERY DAY 90 tablet 0    potassium chloride CR 20 mEq ER tablet TAKE ONE TABLET BY MOUTH EVERY 12 HOURS 180 tablet 1    warfarin (Jantoven) 7.5 mg tablet TAKE ONE TABLET BY MOUTH EVERY DAY 90 tablet 1    [DISCONTINUED] levothyroxine (Synthroid, Unithroid) 300 mcg tablet Take one tablet by mouth every day along with a 50mcg tablet for a total daily dose of 250mcg 90 tablet 1     No current facility-administered medications on file prior to visit.        Objective   Anticoagulation Summary  As of 6/20/2024      INR goal:  2.0-3.0   TTR:  83.0% (7.1 mo)   INR used for dosing:  3.40 (6/20/2024)   Weekly warfarin total:  52.5 mg             Lab Results   Component Value Date    INR 3.40 (A) 06/20/2024    INR 2.60 (N) 06/13/2024    INR 2.40 (N) 06/06/2024    PROTIME 21.3 (H) 01/21/2024    PROTIME 18.2 (H) 01/20/2024    PROTIME 16.2 (H) 01/19/2024       Assessment/Plan   Current INR is Supratherapeutic at 3.4. Previous INR was Therapeutic at 2.6. Patient reports that her levothyroxine dose was increased from 250 mcg to 300 mcg. Also reports that her carvedilol dose was cut in half. Levothyroxine and warfarin have a category C DDI for increased anticoagulant effects which may be contributing to elevated INR today. Advised patient to take 3.75 mg x1 dose today, then resume current warfarin regimen of 7.5 mg every day. Will continue to monitor and evaluate for permanent dose reduction at next visit.    Follow Up: 1 week    Katja Chisholm, PharmD

## 2024-06-21 RX ORDER — WARFARIN SODIUM 7.5 MG/1
7.5 TABLET ORAL
Qty: 90 TABLET | Refills: 1 | Status: SHIPPED | OUTPATIENT
Start: 2024-06-21

## 2024-06-27 ENCOUNTER — ANTICOAGULATION - WARFARIN VISIT (OUTPATIENT)
Dept: PHARMACY | Facility: HOSPITAL | Age: 69
End: 2024-06-27
Payer: MEDICARE

## 2024-06-27 DIAGNOSIS — I48.91 ATRIAL FIBRILLATION, UNSPECIFIED TYPE (MULTI): Primary | ICD-10-CM

## 2024-06-27 LAB
INR IN PPP BY COAGULATION ASSAY EXTERNAL: 2.3 (ref 2–3)
PROTHROMBIN TIME (PT) IN PPP BY COAGULATION ASSAY EXTERNAL: ABNORMAL SECONDS

## 2024-06-27 NOTE — PROGRESS NOTES
SUBJECTIVE    Emma Villela is a 69 y.o. female who is enrolled in the [facility] for anticoagulation management.     Referring Provider: Dr. Ti Nolan   INR Goal: 2.0-3.0  Indication: Atrial Fibrillation/Atrial Flutter    OBJECTIVE    Lab Results   Component Value Date    INR 2.30 (N) 06/27/2024    INR 3.40 (A) 06/20/2024    INR 2.60 (N) 06/13/2024    PROTIME 21.3 (H) 01/21/2024    PROTIME 18.2 (H) 01/20/2024    PROTIME 16.2 (H) 01/19/2024       ASSESSMENT AND PLAN     Current INR is Therapeutic at 2.3. Previous INR was Supratherapeutic at 3.4. Plan to continue current warfarin regimen of 7.5 mg every day. Follow up with INR check at home in one week.     Katja Chisholm, PharmD

## 2024-07-02 DIAGNOSIS — K21.9 GASTROESOPHAGEAL REFLUX DISEASE, UNSPECIFIED WHETHER ESOPHAGITIS PRESENT: ICD-10-CM

## 2024-07-02 RX ORDER — PANTOPRAZOLE SODIUM 40 MG/1
40 TABLET, DELAYED RELEASE ORAL DAILY
Qty: 90 TABLET | Refills: 1 | Status: SHIPPED | OUTPATIENT
Start: 2024-07-02

## 2024-07-08 ENCOUNTER — ANTICOAGULATION - WARFARIN VISIT (OUTPATIENT)
Dept: PHARMACY | Facility: HOSPITAL | Age: 69
End: 2024-07-08
Payer: MEDICARE

## 2024-07-08 DIAGNOSIS — I48.91 ATRIAL FIBRILLATION, UNSPECIFIED TYPE (MULTI): Primary | ICD-10-CM

## 2024-07-08 NOTE — PROGRESS NOTES
SUBJECTIVE    Emma Villela is a 69 y.o. female who is enrolled in the [facility] for anticoagulation management.     Referring Provider: Dr. Ti Nolan   INR Goal: 2.0-3.0  Indication: Atrial Fibrillation/Atrial Flutter    OBJECTIVE    Lab Results   Component Value Date    INR 2.30 (N) 06/27/2024    INR 3.40 (A) 06/20/2024    INR 2.60 (N) 06/13/2024    PROTIME 21.3 (H) 01/21/2024    PROTIME 18.2 (H) 01/20/2024    PROTIME 16.2 (H) 01/19/2024       ASSESSMENT AND PLAN     Current INR is Therapeutic at 2.1. Previous INR was Therapeutic at 2.3. Plan to continue current warfarin regimen of 7.5 mg every day. Follow up with INR check at home in one week.     Katja Chisholm, PharmD

## 2024-07-11 ENCOUNTER — ANTICOAGULATION - WARFARIN VISIT (OUTPATIENT)
Dept: PHARMACY | Facility: HOSPITAL | Age: 69
End: 2024-07-11
Payer: MEDICARE

## 2024-07-11 DIAGNOSIS — I48.91 ATRIAL FIBRILLATION, UNSPECIFIED TYPE (MULTI): Primary | ICD-10-CM

## 2024-07-11 LAB
INR IN PPP BY COAGULATION ASSAY EXTERNAL: 2.5 (ref 2–3)
PROTHROMBIN TIME (PT) IN PPP BY COAGULATION ASSAY EXTERNAL: ABNORMAL

## 2024-07-11 NOTE — PROGRESS NOTES
SUBJECTIVE    Emma Villela is a 69 y.o. female who is enrolled in the [facility] for anticoagulation management.     Referring Provider: Dr. Ti Nolan   INR Goal: 2.0-3.0  Indication: Atrial Fibrillation/Atrial Flutter    OBJECTIVE    Lab Results   Component Value Date    INR 2.10 (N) 07/05/2024    INR 2.30 (N) 06/27/2024    INR 3.40 (A) 06/20/2024    PROTIME 21.3 (H) 01/21/2024    PROTIME 18.2 (H) 01/20/2024    PROTIME 16.2 (H) 01/19/2024       ASSESSMENT AND PLAN     Current INR is Therapeutic at 2.5. Previous INR was Therapeutic at 2.1. Plan to continue current warfarin regimen of 7.5 mg every day. Follow up with INR check at home in one week.     Katja Chisholm, PharmD

## 2024-07-18 ENCOUNTER — ANTICOAGULATION - WARFARIN VISIT (OUTPATIENT)
Dept: PHARMACY | Facility: HOSPITAL | Age: 69
End: 2024-07-18
Payer: MEDICARE

## 2024-07-18 DIAGNOSIS — I48.91 ATRIAL FIBRILLATION, UNSPECIFIED TYPE (MULTI): Primary | ICD-10-CM

## 2024-07-18 LAB
INR IN PPP BY COAGULATION ASSAY EXTERNAL: 2.7 (ref 2–3)
PROTHROMBIN TIME (PT) IN PPP BY COAGULATION ASSAY EXTERNAL: ABNORMAL

## 2024-07-18 NOTE — PROGRESS NOTES
Subjective     Emma Villela is a 69 y.o. female who presents for anticoagulation follow up.     Location: [facility]     Referring Provider: Dr. Ti Nolan   INR Goal: 2.0-3.0  Indication: Atrial Fibrillation/Atrial Flutter      Current Outpatient Medications on File Prior to Visit   Medication Sig Dispense Refill    acetaminophen (Tylenol) 325 mg tablet Take 1 tablet (325 mg) by mouth every 4 hours if needed.      albuterol 90 mcg/actuation inhaler Inhale 2 puffs every 6 hours if needed.      amitriptyline (Elavil) 25 mg tablet TAKE ONE TABLET BY MOUTH AT BEDTIME 90 tablet 0    aspirin 81 mg EC tablet Take 1 tablet (81 mg) by mouth once daily.      atorvastatin (Lipitor) 40 mg tablet TAKE ONE TABLET BY MOUTH DAILY 90 tablet 1    budesonide-formoteroL (Symbicort) 160-4.5 mcg/actuation inhaler Inhale 2 puffs 2 times a day. 10.2 g 11    carvedilol (Coreg) 25 mg tablet Take 0.5 tablets (12.5 mg) by mouth 2 times a day with meals. Take with food. 180 tablet 0    cholecalciferol (Vitamin D-3) 125 MCG (5000 UT) capsule TAKE ONE CAPSULE BY MOUTH EVERY DAY 90 capsule 0    ferrous gluconate 324 (38 Fe) MG tablet TAKE ONE TABLET BY MOUTH DAILY 90 tablet 1    furosemide (Lasix) 40 mg tablet Take 1 tablet (40 mg) by mouth 2 times a day. 180 tablet 1    hydrALAZINE (Apresoline) 100 mg tablet TAKE ONE TABLET BY MOUTH THREE TIMES A  tablet 0    levothyroxine (Synthroid, Unithroid) 300 mcg tablet Take one tablet every day 90 tablet 1    losartan (Cozaar) 100 mg tablet TAKE ONE TABLET BY MOUTH DAILY 90 tablet 0    magnesium oxide (Mag-Ox) 400 mg (241.3 mg magnesium) tablet TAKE ONE TABLET BY MOUTH TWICE A DAY AFTER MEALS 180 tablet 1    meclizine (Antivert) 25 mg tablet TAKE ONE TABLET BY MOUTH AT BEDTIME 30 tablet 2    metOLazone (Zaroxolyn) 5 mg tablet Take 1 tablet (5 mg) by mouth. Twice a week      oxybutynin XL (Ditropan-XL) 15 mg 24 hr tablet Take 1 tablet (15 mg) by mouth once  daily.      pantoprazole (ProtoNix) 40 mg EC tablet TAKE ONE TABLET BY MOUTH DAILY 90 tablet 1    potassium chloride CR 20 mEq ER tablet TAKE ONE TABLET BY MOUTH EVERY 12 HOURS 180 tablet 1    warfarin (Jantoven) 7.5 mg tablet TAKE ONE TABLET BY MOUTH EVERY DAY 90 tablet 1     No current facility-administered medications on file prior to visit.        Objective   Anticoagulation Summary  As of 2024      INR goal:  2.0-3.0   TTR:  83.9% (8 mo)   INR used for dosin.70 (2024)   Weekly warfarin total:  52.5 mg             Lab Results   Component Value Date    INR 2.70 (N) 2024    INR 2.50 (N) 2024    INR 2.10 (N) 2024    PROTIME 21.3 (H) 2024    PROTIME 18.2 (H) 2024    PROTIME 16.2 (H) 2024       Assessment/Plan   Current INR is Therapeutic at 2.7. Previous INR was Therapeutic at 2.5.   Advised patient to continue current warfarin regimen of 7.5 mg every day.     Follow Up:  home INR check in one week     Katja Chisholm, NolaD

## 2024-07-22 DIAGNOSIS — K29.90 GASTRODUODENITIS: ICD-10-CM

## 2024-07-22 RX ORDER — AMITRIPTYLINE HYDROCHLORIDE 25 MG/1
25 TABLET, FILM COATED ORAL NIGHTLY
Qty: 90 TABLET | Refills: 0 | Status: SHIPPED | OUTPATIENT
Start: 2024-07-22

## 2024-07-25 ENCOUNTER — ANTICOAGULATION - WARFARIN VISIT (OUTPATIENT)
Dept: PHARMACY | Facility: HOSPITAL | Age: 69
End: 2024-07-25
Payer: MEDICARE

## 2024-07-25 LAB
INR IN PPP BY COAGULATION ASSAY EXTERNAL: 2.3 (ref 2–3)
PROTHROMBIN TIME (PT) IN PPP BY COAGULATION ASSAY EXTERNAL: ABNORMAL

## 2024-07-25 NOTE — PROGRESS NOTES
SUBJECTIVE    Emma Villela is a 69 y.o. female who is enrolled in the [facility] for anticoagulation management.     Referring Provider: Dr. Ti Nolan   INR Goal: 2.0-3.0  Indication: Atrial Fibrillation/Atrial Flutter    OBJECTIVE    Lab Results   Component Value Date    INR 2.70 (N) 07/18/2024    INR 2.50 (N) 07/11/2024    INR 2.10 (N) 07/05/2024    PROTIME 21.3 (H) 01/21/2024    PROTIME 18.2 (H) 01/20/2024    PROTIME 16.2 (H) 01/19/2024       ASSESSMENT AND PLAN     Current INR is Therapeutic at 2.3. Previous INR was Therapeutic at 2.7. Plan to continue current warfarin regimen of 7.5 mg every day. Follow up with INR check at home in one week.     Katja Chisholm, PharmD

## 2024-08-01 ENCOUNTER — ANTICOAGULATION - WARFARIN VISIT (OUTPATIENT)
Dept: PHARMACY | Facility: HOSPITAL | Age: 69
End: 2024-08-01
Payer: MEDICARE

## 2024-08-01 DIAGNOSIS — I48.91 ATRIAL FIBRILLATION, UNSPECIFIED TYPE (MULTI): Primary | ICD-10-CM

## 2024-08-01 NOTE — PROGRESS NOTES
SUBJECTIVE    Emma Villela is a 69 y.o. female who is enrolled in the [facility] for anticoagulation management.     Referring Provider: Dr. Ti Nolan   INR Goal: 2.0-3.0  Indication: Atrial Fibrillation/Atrial Flutter    OBJECTIVE    Lab Results   Component Value Date    INR 2.70 (N) 08/01/2024    INR 2.30 (N) 07/25/2024    INR 2.70 (N) 07/18/2024    PROTIME 21.3 (H) 01/21/2024    PROTIME 18.2 (H) 01/20/2024    PROTIME 16.2 (H) 01/19/2024       ASSESSMENT AND PLAN     Current INR is Therapeutic at 2.7. Previous INR was Therapeutic at 2.3. Plan to continue current warfarin regimen of 7.5 mg every day. Follow up with INR check at home in one week.     Katja Chisholm, PharmD

## 2024-08-06 ENCOUNTER — LAB (OUTPATIENT)
Dept: LAB | Facility: LAB | Age: 69
End: 2024-08-06
Payer: MEDICARE

## 2024-08-06 DIAGNOSIS — I71.43 INFRARENAL ABDOMINAL AORTIC ANEURYSM (AAA) WITHOUT RUPTURE (CMS-HCC): ICD-10-CM

## 2024-08-06 DIAGNOSIS — E55.9 VITAMIN D DEFICIENCY: ICD-10-CM

## 2024-08-06 DIAGNOSIS — I71.40 ABDOMINAL AORTIC ANEURYSM (AAA) WITHOUT RUPTURE, UNSPECIFIED PART (CMS-HCC): ICD-10-CM

## 2024-08-06 LAB
ANION GAP SERPL CALC-SCNC: 9 MMOL/L (ref 10–20)
BUN SERPL-MCNC: 24 MG/DL (ref 6–23)
CALCIUM SERPL-MCNC: 10.5 MG/DL (ref 8.6–10.3)
CHLORIDE SERPL-SCNC: 96 MMOL/L (ref 98–107)
CO2 SERPL-SCNC: 40 MMOL/L (ref 21–32)
CREAT SERPL-MCNC: 1.3 MG/DL (ref 0.5–1.05)
EGFRCR SERPLBLD CKD-EPI 2021: 44 ML/MIN/1.73M*2
GLUCOSE SERPL-MCNC: 109 MG/DL (ref 74–99)
POTASSIUM SERPL-SCNC: 3.9 MMOL/L (ref 3.5–5.3)
SODIUM SERPL-SCNC: 141 MMOL/L (ref 136–145)

## 2024-08-06 PROCEDURE — 36415 COLL VENOUS BLD VENIPUNCTURE: CPT

## 2024-08-07 RX ORDER — CHOLECALCIFEROL (VITAMIN D3) 125 MCG
125 CAPSULE ORAL DAILY
Qty: 90 CAPSULE | Refills: 1 | Status: SHIPPED | OUTPATIENT
Start: 2024-08-07

## 2024-08-08 ENCOUNTER — ANTICOAGULATION - WARFARIN VISIT (OUTPATIENT)
Dept: PHARMACY | Facility: HOSPITAL | Age: 69
End: 2024-08-08
Payer: MEDICARE

## 2024-08-08 DIAGNOSIS — I48.91 ATRIAL FIBRILLATION, UNSPECIFIED TYPE (MULTI): Primary | ICD-10-CM

## 2024-08-08 NOTE — PROGRESS NOTES
SUBJECTIVE    Emma Villela is a 69 y.o. female who is enrolled in the [facility] for anticoagulation management.     Referring Provider: Dr. Ti Nolan   INR Goal: 2.0-3.0  Indication: Atrial Fibrillation/Atrial Flutter    OBJECTIVE    Lab Results   Component Value Date    INR 2.70 (N) 08/01/2024    INR 2.30 (N) 07/25/2024    INR 2.70 (N) 07/18/2024    PROTIME 21.3 (H) 01/21/2024    PROTIME 18.2 (H) 01/20/2024    PROTIME 16.2 (H) 01/19/2024       ASSESSMENT AND PLAN     Current INR is Therapeutic at 2.7. Previous INR was Therapeutic at 2.7. Plan to continue current warfarin regimen of 7.5 mg every day. Follow up with INR check at home in one week.     Katja Chisholm, PharmD

## 2024-08-12 ENCOUNTER — HOSPITAL ENCOUNTER (OUTPATIENT)
Dept: RADIOLOGY | Facility: HOSPITAL | Age: 69
Discharge: HOME | End: 2024-08-12
Payer: MEDICARE

## 2024-08-12 DIAGNOSIS — I71.40 ABDOMINAL AORTIC ANEURYSM (AAA) WITHOUT RUPTURE, UNSPECIFIED PART (CMS-HCC): ICD-10-CM

## 2024-08-12 PROCEDURE — 74174 CTA ABD&PLVS W/CONTRAST: CPT | Performed by: RADIOLOGY

## 2024-08-12 PROCEDURE — 2550000001 HC RX 255 CONTRASTS: Performed by: SURGERY

## 2024-08-12 PROCEDURE — 74174 CTA ABD&PLVS W/CONTRAST: CPT

## 2024-08-15 ENCOUNTER — ANTICOAGULATION - WARFARIN VISIT (OUTPATIENT)
Dept: PHARMACY | Facility: HOSPITAL | Age: 69
End: 2024-08-15
Payer: MEDICARE

## 2024-08-15 DIAGNOSIS — I48.91 ATRIAL FIBRILLATION, UNSPECIFIED TYPE (MULTI): Primary | ICD-10-CM

## 2024-08-15 LAB
INR IN PPP BY COAGULATION ASSAY EXTERNAL: 3 (ref 2–3)
PROTHROMBIN TIME (PT) IN PPP BY COAGULATION ASSAY EXTERNAL: ABNORMAL

## 2024-08-15 NOTE — PROGRESS NOTES
SUBJECTIVE    Emma Villela is a 69 y.o. female who is enrolled in the [facility] for anticoagulation management.     Referring Provider: Dr. Ti Nolan   INR Goal: 2.0-3.0  Indication: Atrial Fibrillation/Atrial Flutter    OBJECTIVE    Lab Results   Component Value Date    INR 2.70 (N) 08/08/2024    INR 2.70 (N) 08/01/2024    INR 2.30 (N) 07/25/2024    PROTIME 21.3 (H) 01/21/2024    PROTIME 18.2 (H) 01/20/2024    PROTIME 16.2 (H) 01/19/2024       ASSESSMENT AND PLAN     Current INR is Therapeutic at 3.0. Previous INR was Therapeutic at 2.7. Plan to continue current warfarin regimen of 7.5 mg every day. Follow up with INR check at home in one week.     Katja Chisholm, PharmD

## 2024-08-20 DIAGNOSIS — E78.00 HYPERCHOLESTEROLEMIA: ICD-10-CM

## 2024-08-20 DIAGNOSIS — E87.6 HYPOKALEMIA: ICD-10-CM

## 2024-08-20 RX ORDER — ATORVASTATIN CALCIUM 40 MG/1
40 TABLET, FILM COATED ORAL DAILY
Qty: 90 TABLET | Refills: 1 | Status: SHIPPED | OUTPATIENT
Start: 2024-08-20

## 2024-08-20 RX ORDER — POTASSIUM CHLORIDE 1500 MG/1
20 TABLET, EXTENDED RELEASE ORAL EVERY 12 HOURS
Qty: 180 TABLET | Refills: 1 | Status: SHIPPED | OUTPATIENT
Start: 2024-08-20

## 2024-08-21 ENCOUNTER — OFFICE VISIT (OUTPATIENT)
Dept: VASCULAR SURGERY | Facility: HOSPITAL | Age: 69
End: 2024-08-21
Payer: MEDICARE

## 2024-08-21 VITALS
DIASTOLIC BLOOD PRESSURE: 85 MMHG | HEART RATE: 95 BPM | OXYGEN SATURATION: 91 % | BODY MASS INDEX: 45.99 KG/M2 | HEIGHT: 67 IN | SYSTOLIC BLOOD PRESSURE: 136 MMHG | WEIGHT: 293 LBS

## 2024-08-21 DIAGNOSIS — I71.40 ABDOMINAL AORTIC ANEURYSM (AAA) WITHOUT RUPTURE, UNSPECIFIED PART (CMS-HCC): ICD-10-CM

## 2024-08-21 DIAGNOSIS — I71.42 JUXTARENAL ABDOMINAL AORTIC ANEURYSM (AAA) WITHOUT RUPTURE (CMS-HCC): Primary | ICD-10-CM

## 2024-08-21 PROCEDURE — 3079F DIAST BP 80-89 MM HG: CPT | Performed by: SURGERY

## 2024-08-21 PROCEDURE — 99214 OFFICE O/P EST MOD 30 MIN: CPT | Performed by: SURGERY

## 2024-08-21 PROCEDURE — 1159F MED LIST DOCD IN RCRD: CPT | Performed by: SURGERY

## 2024-08-21 PROCEDURE — 3008F BODY MASS INDEX DOCD: CPT | Performed by: SURGERY

## 2024-08-21 PROCEDURE — 1157F ADVNC CARE PLAN IN RCRD: CPT | Performed by: SURGERY

## 2024-08-21 PROCEDURE — 1126F AMNT PAIN NOTED NONE PRSNT: CPT | Performed by: SURGERY

## 2024-08-21 PROCEDURE — 3075F SYST BP GE 130 - 139MM HG: CPT | Performed by: SURGERY

## 2024-08-21 ASSESSMENT — ENCOUNTER SYMPTOMS
LOSS OF SENSATION IN FEET: 0
OCCASIONAL FEELINGS OF UNSTEADINESS: 0
DEPRESSION: 0

## 2024-08-21 ASSESSMENT — PAIN SCALES - GENERAL: PAINLEVEL: 0-NO PAIN

## 2024-08-21 ASSESSMENT — COLUMBIA-SUICIDE SEVERITY RATING SCALE - C-SSRS
2. HAVE YOU ACTUALLY HAD ANY THOUGHTS OF KILLING YOURSELF?: NO
1. IN THE PAST MONTH, HAVE YOU WISHED YOU WERE DEAD OR WISHED YOU COULD GO TO SLEEP AND NOT WAKE UP?: NO
6. HAVE YOU EVER DONE ANYTHING, STARTED TO DO ANYTHING, OR PREPARED TO DO ANYTHING TO END YOUR LIFE?: NO

## 2024-08-21 NOTE — PROGRESS NOTES
Vascular Surgery Clinic Note    CC: AAA    HPI:  Emma Villela is 69 y.o. female with history of EVAR with type Ia endoleak treated with proximal extension and RRA snorkel. She has done well. I reviewed her most recent CTA that shows resolution of the gutter leak and stable sac size at about 6.8cm - 7cm depending on location. She had a tough course following the last procedure with severe COPD.     Medical History:   has a past medical history of Cellulitis, unspecified, COPD (chronic obstructive pulmonary disease) (Multi), Essential (primary) hypertension (05/21/2013), Nontoxic goiter, unspecified, Other conditions influencing health status, Personal history of other diseases of the circulatory system (03/03/2015), Personal history of other diseases of the nervous system and sense organs, Personal history of other diseases of the respiratory system (10/21/2014), Personal history of other medical treatment (01/25/2022), Personal history of other specified conditions (06/25/2013), and Sleep apnea.    Meds:   Current Outpatient Medications on File Prior to Visit   Medication Sig Dispense Refill    acetaminophen (Tylenol) 325 mg tablet Take 1 tablet (325 mg) by mouth every 4 hours if needed.      albuterol 90 mcg/actuation inhaler Inhale 2 puffs every 6 hours if needed.      amitriptyline (Elavil) 25 mg tablet TAKE ONE TABLET BY MOUTH AT BEDTIME 90 tablet 0    aspirin 81 mg EC tablet Take 1 tablet (81 mg) by mouth once daily.      atorvastatin (Lipitor) 40 mg tablet TAKE ONE TABLET BY MOUTH DAILY 90 tablet 1    budesonide-formoteroL (Symbicort) 160-4.5 mcg/actuation inhaler Inhale 2 puffs 2 times a day. 10.2 g 11    carvedilol (Coreg) 25 mg tablet Take 0.5 tablets (12.5 mg) by mouth 2 times a day with meals. Take with food. 180 tablet 0    cholecalciferol (Vitamin D-3) 125 MCG (5000 UT) capsule TAKE ONE CAPSULE BY MOUTH DAILY 90 capsule 1    ferrous gluconate 324 (38 Fe) MG tablet TAKE ONE TABLET BY MOUTH DAILY 90  tablet 1    furosemide (Lasix) 40 mg tablet Take 1 tablet (40 mg) by mouth 2 times a day. 180 tablet 1    hydrALAZINE (Apresoline) 100 mg tablet TAKE ONE TABLET BY MOUTH THREE TIMES A  tablet 0    levothyroxine (Synthroid, Unithroid) 300 mcg tablet Take one tablet every day 90 tablet 1    losartan (Cozaar) 100 mg tablet TAKE ONE TABLET BY MOUTH DAILY 90 tablet 0    magnesium oxide (Mag-Ox) 400 mg (241.3 mg magnesium) tablet TAKE ONE TABLET BY MOUTH TWICE A DAY AFTER MEALS 180 tablet 1    meclizine (Antivert) 25 mg tablet TAKE ONE TABLET BY MOUTH AT BEDTIME 30 tablet 2    metOLazone (Zaroxolyn) 5 mg tablet Take 1 tablet (5 mg) by mouth. Twice a week      oxybutynin XL (Ditropan-XL) 15 mg 24 hr tablet Take 1 tablet (15 mg) by mouth once daily.      pantoprazole (ProtoNix) 40 mg EC tablet TAKE ONE TABLET BY MOUTH DAILY 90 tablet 1    potassium chloride CR 20 mEq ER tablet TAKE ONE TABLET BY MOUTH EVERY 12 HOURS 180 tablet 1    warfarin (Jantoven) 7.5 mg tablet TAKE ONE TABLET BY MOUTH EVERY DAY 90 tablet 1    [DISCONTINUED] atorvastatin (Lipitor) 40 mg tablet TAKE ONE TABLET BY MOUTH DAILY 90 tablet 1    [DISCONTINUED] potassium chloride CR 20 mEq ER tablet TAKE ONE TABLET BY MOUTH EVERY 12 HOURS 180 tablet 1     No current facility-administered medications on file prior to visit.        Allergies:   No Known Allergies    SH:    Social Determinants of Health     Tobacco Use: Medium Risk (8/21/2024)    Patient History     Smoking Tobacco Use: Former     Smokeless Tobacco Use: Never     Passive Exposure: Not on file   Alcohol Use: Not At Risk (1/17/2024)    AUDIT-C     Frequency of Alcohol Consumption: Never     Average Number of Drinks: Patient does not drink     Frequency of Binge Drinking: Never   Financial Resource Strain: Low Risk  (1/17/2024)    Overall Financial Resource Strain (CARDIA)     Difficulty of Paying Living Expenses: Not hard at all   Food Insecurity: Not on file   Transportation Needs: No  Transportation Needs (1/22/2024)    PRAPARE - Transportation     Lack of Transportation (Medical): No     Lack of Transportation (Non-Medical): No   Physical Activity: Not on file   Stress: Not on file   Social Connections: Unknown (1/22/2024)    Social Connection and Isolation Panel [NHANES]     Frequency of Communication with Friends and Family: Not on file     Frequency of Social Gatherings with Friends and Family: Not on file     Attends Denominational Services: Not on file     Active Member of Clubs or Organizations: Not on file     Attends Club or Organization Meetings: Not on file     Marital Status:    Intimate Partner Violence: Not on file   Depression: Not at risk (8/21/2024)    PHQ-2     PHQ-2 Score: 0   Housing Stability: Low Risk  (1/17/2024)    Housing Stability Vital Sign     Unable to Pay for Housing in the Last Year: No     Number of Places Lived in the Last Year: 1     Unstable Housing in the Last Year: No   Utilities: Not on file   Digital Equity: Not on file   Health Literacy: Not on file        FH:  Family History   Problem Relation Name Age of Onset    Stroke Father      Heart attack Sister      Breast cancer Maternal Grandmother          ROS:  All systems were reviewed and are negative except as per HPI.    Objective:  Vitals:  Vitals:    08/21/24 0942   BP: 136/85   Pulse:    SpO2:         Exam:  In NAD, well appearing  Abd Soft, ND/NT      Assessment & Plan:  Emma Villela is 69 y.o. female doing well s/p complex EVAR. We agreed to rescan in 1 year. She is not a good candidate for another intervention.      I spent a total of 30 minutes on the day of the visit.         Sj Walton M.D.

## 2024-08-22 ENCOUNTER — ANTICOAGULATION - WARFARIN VISIT (OUTPATIENT)
Dept: PHARMACY | Facility: HOSPITAL | Age: 69
End: 2024-08-22
Payer: MEDICARE

## 2024-08-22 DIAGNOSIS — I48.91 ATRIAL FIBRILLATION, UNSPECIFIED TYPE (MULTI): Primary | ICD-10-CM

## 2024-08-22 LAB
INR IN PPP BY COAGULATION ASSAY EXTERNAL: 5.7 (ref 2–3)
PROTHROMBIN TIME (PT) IN PPP BY COAGULATION ASSAY EXTERNAL: ABNORMAL

## 2024-08-22 NOTE — PROGRESS NOTES
Subjective     Emma Villela is a 69 y.o. female who presents for anticoagulation follow up.     Location: [facility]     Referring Provider: Dr. Ti Nolan   INR Goal: 2.0-3.0  Indication: Atrial Fibrillation/Atrial Flutter    Bleeding signs/symptoms: No  Bruising: No   Major bleeding event: No  Thrombosis signs/symptoms: No  Thromboembolic event: No  Missed doses: No  Extra doses: No  Medication changes: Yes  Dietary changes: No  Change in health: Yes  Change in activity: No  Alcohol: No  Other concerns: No  Upcoming Surgeries: no  Parenteral anticoagulation: no    Current Outpatient Medications on File Prior to Visit   Medication Sig Dispense Refill    acetaminophen (Tylenol) 325 mg tablet Take 1 tablet (325 mg) by mouth every 4 hours if needed.      albuterol 90 mcg/actuation inhaler Inhale 2 puffs every 6 hours if needed.      amitriptyline (Elavil) 25 mg tablet TAKE ONE TABLET BY MOUTH AT BEDTIME 90 tablet 0    aspirin 81 mg EC tablet Take 1 tablet (81 mg) by mouth once daily.      atorvastatin (Lipitor) 40 mg tablet TAKE ONE TABLET BY MOUTH DAILY 90 tablet 1    budesonide-formoteroL (Symbicort) 160-4.5 mcg/actuation inhaler Inhale 2 puffs 2 times a day. 10.2 g 11    carvedilol (Coreg) 25 mg tablet Take 0.5 tablets (12.5 mg) by mouth 2 times a day with meals. Take with food. 180 tablet 0    cholecalciferol (Vitamin D-3) 125 MCG (5000 UT) capsule TAKE ONE CAPSULE BY MOUTH DAILY 90 capsule 1    ferrous gluconate 324 (38 Fe) MG tablet TAKE ONE TABLET BY MOUTH DAILY 90 tablet 1    furosemide (Lasix) 40 mg tablet Take 1 tablet (40 mg) by mouth 2 times a day. 180 tablet 1    hydrALAZINE (Apresoline) 100 mg tablet TAKE ONE TABLET BY MOUTH THREE TIMES A  tablet 0    levothyroxine (Synthroid, Unithroid) 300 mcg tablet Take one tablet every day 90 tablet 1    losartan (Cozaar) 100 mg tablet TAKE ONE TABLET BY MOUTH DAILY 90 tablet 0    magnesium oxide (Mag-Ox) 400 mg  (241.3 mg magnesium) tablet TAKE ONE TABLET BY MOUTH TWICE A DAY AFTER MEALS 180 tablet 1    meclizine (Antivert) 25 mg tablet TAKE ONE TABLET BY MOUTH AT BEDTIME 30 tablet 2    metOLazone (Zaroxolyn) 5 mg tablet Take 1 tablet (5 mg) by mouth. Twice a week      oxybutynin XL (Ditropan-XL) 15 mg 24 hr tablet Take 1 tablet (15 mg) by mouth once daily.      pantoprazole (ProtoNix) 40 mg EC tablet TAKE ONE TABLET BY MOUTH DAILY 90 tablet 1    potassium chloride CR 20 mEq ER tablet TAKE ONE TABLET BY MOUTH EVERY 12 HOURS 180 tablet 1    warfarin (Jantoven) 7.5 mg tablet TAKE ONE TABLET BY MOUTH EVERY DAY 90 tablet 1    [DISCONTINUED] atorvastatin (Lipitor) 40 mg tablet TAKE ONE TABLET BY MOUTH DAILY 90 tablet 1    [DISCONTINUED] potassium chloride CR 20 mEq ER tablet TAKE ONE TABLET BY MOUTH EVERY 12 HOURS 180 tablet 1     No current facility-administered medications on file prior to visit.        Objective   Anticoagulation Summary  As of 2024      INR goal:  2.0-3.0   TTR:  83.4% (9.2 mo)   INR used for dosin.70 (2024)   Weekly warfarin total:  52.5 mg             Lab Results   Component Value Date    INR 5.70 (A) 2024    INR 3.00 (N) 08/15/2024    INR 2.70 (N) 2024    PROTIME 21.3 (H) 2024    PROTIME 18.2 (H) 2024    PROTIME 16.2 (H) 2024       Assessment/Plan   Current INR is Supratherapeutic at 5.7. Previous INR was Therapeutic at 3.0. Patient reports illness last week and reduced appetite. States that she was on a mostly liquid diet. Also reports taking advil a few times for symptoms. Patient states that she is feeling better at this time. Advised patient to hold warfarin x2 doses then resume current warfarin regimen of 7.5 mg daily.     Follow Up: Home INR check in one week     Katja Chisholm, NolaD

## 2024-08-29 ENCOUNTER — ANTICOAGULATION - WARFARIN VISIT (OUTPATIENT)
Dept: PHARMACY | Facility: HOSPITAL | Age: 69
End: 2024-08-29
Payer: MEDICARE

## 2024-08-29 DIAGNOSIS — I48.91 ATRIAL FIBRILLATION, UNSPECIFIED TYPE (MULTI): Primary | ICD-10-CM

## 2024-08-29 DIAGNOSIS — I10 BENIGN ESSENTIAL HYPERTENSION: ICD-10-CM

## 2024-08-29 LAB
INR IN PPP BY COAGULATION ASSAY EXTERNAL: 2.6 (ref 2–3)
PROTHROMBIN TIME (PT) IN PPP BY COAGULATION ASSAY EXTERNAL: ABNORMAL

## 2024-08-29 NOTE — PROGRESS NOTES
SUBJECTIVE    Emma Villela is a 69 y.o. female who is enrolled in the [facility] for anticoagulation management.     Referring Provider: Dr. Ti Nolan   INR Goal: 2.0-3.0  Indication: Atrial Fibrillation/Atrial Flutter    OBJECTIVE    Lab Results   Component Value Date    INR 5.70 (A) 08/22/2024    INR 3.00 (N) 08/15/2024    INR 2.70 (N) 08/08/2024    PROTIME 21.3 (H) 01/21/2024    PROTIME 18.2 (H) 01/20/2024    PROTIME 16.2 (H) 01/19/2024       ASSESSMENT AND PLAN     Current INR is Therapeutic at 2.6. Previous INR was Supratherapeutic at 5.7. Previously supratherapeutic level believed to be related to illness, altered appetite and medication changes. Plan to continue current warfarin regimen of 7.5 mg every day. Follow up with INR check at home in one week.     Katja Chisholm, PharmD

## 2024-08-30 RX ORDER — LOSARTAN POTASSIUM 100 MG/1
100 TABLET ORAL DAILY
Qty: 90 TABLET | Refills: 0 | Status: SHIPPED | OUTPATIENT
Start: 2024-08-30

## 2024-09-05 ENCOUNTER — ANTICOAGULATION - WARFARIN VISIT (OUTPATIENT)
Dept: PHARMACY | Facility: HOSPITAL | Age: 69
End: 2024-09-05
Payer: MEDICARE

## 2024-09-05 DIAGNOSIS — I48.91 ATRIAL FIBRILLATION, UNSPECIFIED TYPE (MULTI): Primary | ICD-10-CM

## 2024-09-05 LAB
INR IN PPP BY COAGULATION ASSAY EXTERNAL: 3.1 (ref 2–3)
PROTHROMBIN TIME (PT) IN PPP BY COAGULATION ASSAY EXTERNAL: ABNORMAL

## 2024-09-05 NOTE — PROGRESS NOTES
Subjective     Emma Villela is a 69 y.o. female who presents for anticoagulation follow up.     Location: [facility]     Referring Provider: Dr. Ti Nolan   INR Goal: 2.0-3.0  Indication: Atrial Fibrillation/Atrial Flutter    Bleeding signs/symptoms: No  Bruising: No   Major bleeding event: No  Thrombosis signs/symptoms: No  Thromboembolic event: No  Missed doses: No  Extra doses: No  Medication changes: No  Dietary changes: No  Change in health: No  Change in activity: No  Alcohol: No  Other concerns: No  Upcoming Surgeries: no  Parenteral anticoagulation: no    Current Outpatient Medications on File Prior to Visit   Medication Sig Dispense Refill    acetaminophen (Tylenol) 325 mg tablet Take 1 tablet (325 mg) by mouth every 4 hours if needed.      albuterol 90 mcg/actuation inhaler Inhale 2 puffs every 6 hours if needed.      amitriptyline (Elavil) 25 mg tablet TAKE ONE TABLET BY MOUTH AT BEDTIME 90 tablet 0    aspirin 81 mg EC tablet Take 1 tablet (81 mg) by mouth once daily.      atorvastatin (Lipitor) 40 mg tablet TAKE ONE TABLET BY MOUTH DAILY 90 tablet 1    budesonide-formoteroL (Symbicort) 160-4.5 mcg/actuation inhaler Inhale 2 puffs 2 times a day. 10.2 g 11    carvedilol (Coreg) 25 mg tablet Take 0.5 tablets (12.5 mg) by mouth 2 times a day with meals. Take with food. 180 tablet 0    cholecalciferol (Vitamin D-3) 125 MCG (5000 UT) capsule TAKE ONE CAPSULE BY MOUTH DAILY 90 capsule 1    ferrous gluconate 324 (38 Fe) MG tablet TAKE ONE TABLET BY MOUTH DAILY 90 tablet 1    furosemide (Lasix) 40 mg tablet Take 1 tablet (40 mg) by mouth 2 times a day. 180 tablet 1    hydrALAZINE (Apresoline) 100 mg tablet TAKE ONE TABLET BY MOUTH THREE TIMES A  tablet 0    levothyroxine (Synthroid, Unithroid) 300 mcg tablet Take one tablet every day 90 tablet 1    losartan (Cozaar) 100 mg tablet TAKE ONE TABLET BY MOUTH DAILY 90 tablet 0    magnesium oxide (Mag-Ox) 400 mg (241.3  mg magnesium) tablet TAKE ONE TABLET BY MOUTH TWICE A DAY AFTER MEALS 180 tablet 1    meclizine (Antivert) 25 mg tablet TAKE ONE TABLET BY MOUTH AT BEDTIME 30 tablet 2    metOLazone (Zaroxolyn) 5 mg tablet Take 1 tablet (5 mg) by mouth. Twice a week      oxybutynin XL (Ditropan-XL) 15 mg 24 hr tablet Take 1 tablet (15 mg) by mouth once daily.      pantoprazole (ProtoNix) 40 mg EC tablet TAKE ONE TABLET BY MOUTH DAILY 90 tablet 1    potassium chloride CR 20 mEq ER tablet TAKE ONE TABLET BY MOUTH EVERY 12 HOURS 180 tablet 1    warfarin (Jantoven) 7.5 mg tablet TAKE ONE TABLET BY MOUTH EVERY DAY 90 tablet 1    [DISCONTINUED] losartan (Cozaar) 100 mg tablet TAKE ONE TABLET BY MOUTH DAILY 90 tablet 0     No current facility-administered medications on file prior to visit.        Objective   Anticoagulation Summary  As of 9/5/2024      INR goal:  2.0-3.0   TTR:  81.6% (9.7 mo)   INR used for dosing:  3.10 (9/5/2024)   Weekly warfarin total:  48.75 mg             Lab Results   Component Value Date    INR 3.10 (A) 09/05/2024    INR 2.60 (N) 08/29/2024    INR 5.70 (A) 08/22/2024    PROTIME 21.3 (H) 01/21/2024    PROTIME 18.2 (H) 01/20/2024    PROTIME 16.2 (H) 01/19/2024       Assessment/Plan   Current INR is Supratherapeutic at 3.1. Previous INR was Therapeutic at 2.6. No changes reported from previous visit. Advised patient to lower current warfarin regimen to 3.75 mg on Thursdays and 7.5 mg all other days.     Follow Up: 1 week - home INR check     Katja Chisholm, NolaD

## 2024-09-07 ENCOUNTER — APPOINTMENT (OUTPATIENT)
Dept: RADIOLOGY | Facility: HOSPITAL | Age: 69
End: 2024-09-07
Payer: MEDICARE

## 2024-09-10 DIAGNOSIS — I10 BENIGN ESSENTIAL HYPERTENSION: ICD-10-CM

## 2024-09-10 DIAGNOSIS — D64.89 ANEMIA DUE TO OTHER CAUSE, NOT CLASSIFIED: ICD-10-CM

## 2024-09-10 RX ORDER — FERROUS GLUCONATE 324(38)MG
1 TABLET ORAL DAILY
Qty: 90 TABLET | Refills: 1 | Status: SHIPPED | OUTPATIENT
Start: 2024-09-10

## 2024-09-10 RX ORDER — HYDRALAZINE HYDROCHLORIDE 100 MG/1
100 TABLET, FILM COATED ORAL 3 TIMES DAILY
Qty: 270 TABLET | Refills: 1 | Status: SHIPPED | OUTPATIENT
Start: 2024-09-10

## 2024-09-12 ENCOUNTER — APPOINTMENT (OUTPATIENT)
Dept: PRIMARY CARE | Facility: CLINIC | Age: 69
End: 2024-09-12
Payer: MEDICARE

## 2024-09-12 ENCOUNTER — APPOINTMENT (OUTPATIENT)
Dept: RADIOLOGY | Facility: HOSPITAL | Age: 69
End: 2024-09-12
Payer: MEDICARE

## 2024-09-12 VITALS
BODY MASS INDEX: 52 KG/M2 | HEART RATE: 109 BPM | OXYGEN SATURATION: 97 % | DIASTOLIC BLOOD PRESSURE: 64 MMHG | SYSTOLIC BLOOD PRESSURE: 108 MMHG | WEIGHT: 293 LBS | TEMPERATURE: 96.7 F

## 2024-09-12 DIAGNOSIS — R42 VERTIGO: ICD-10-CM

## 2024-09-12 DIAGNOSIS — I10 HYPERTENSION, UNSPECIFIED TYPE: ICD-10-CM

## 2024-09-12 DIAGNOSIS — R53.83 OTHER FATIGUE: ICD-10-CM

## 2024-09-12 DIAGNOSIS — N18.31 STAGE 3A CHRONIC KIDNEY DISEASE (MULTI): ICD-10-CM

## 2024-09-12 DIAGNOSIS — E89.0 HYPOTHYROIDISM, POSTSURGICAL: Primary | ICD-10-CM

## 2024-09-12 DIAGNOSIS — E78.00 HYPERCHOLESTEREMIA: ICD-10-CM

## 2024-09-12 DIAGNOSIS — I50.9 CHF (NYHA CLASS III, ACC/AHA STAGE C) (MULTI): ICD-10-CM

## 2024-09-12 PROCEDURE — 3074F SYST BP LT 130 MM HG: CPT | Performed by: INTERNAL MEDICINE

## 2024-09-12 PROCEDURE — 1160F RVW MEDS BY RX/DR IN RCRD: CPT | Performed by: INTERNAL MEDICINE

## 2024-09-12 PROCEDURE — 1157F ADVNC CARE PLAN IN RCRD: CPT | Performed by: INTERNAL MEDICINE

## 2024-09-12 PROCEDURE — 1036F TOBACCO NON-USER: CPT | Performed by: INTERNAL MEDICINE

## 2024-09-12 PROCEDURE — 1159F MED LIST DOCD IN RCRD: CPT | Performed by: INTERNAL MEDICINE

## 2024-09-12 PROCEDURE — 99214 OFFICE O/P EST MOD 30 MIN: CPT | Performed by: INTERNAL MEDICINE

## 2024-09-12 PROCEDURE — 3078F DIAST BP <80 MM HG: CPT | Performed by: INTERNAL MEDICINE

## 2024-09-12 RX ORDER — LEVOTHYROXINE SODIUM 300 UG/1
300 TABLET ORAL DAILY
Qty: 90 TABLET | Refills: 3 | Status: SHIPPED | OUTPATIENT
Start: 2024-09-12 | End: 2025-09-12

## 2024-09-12 RX ORDER — LEVOTHYROXINE SODIUM 100 UG/1
100 TABLET ORAL DAILY
Qty: 90 TABLET | Refills: 3 | Status: SHIPPED | OUTPATIENT
Start: 2024-09-12 | End: 2025-09-12

## 2024-09-12 ASSESSMENT — ENCOUNTER SYMPTOMS
FATIGUE: 0
HEADACHES: 0
SHORTNESS OF BREATH: 1
ABDOMINAL PAIN: 0
ARTHRALGIAS: 0
DIARRHEA: 0
UNEXPECTED WEIGHT CHANGE: 0
DIZZINESS: 0
WHEEZING: 0
PALPITATIONS: 0
SINUS PAIN: 0
BRUISES/BLEEDS EASILY: 0
BLOOD IN STOOL: 0
SORE THROAT: 0
HYPERTENSION: 1
DIFFICULTY URINATING: 0
COUGH: 0
FEVER: 0

## 2024-09-12 NOTE — PROGRESS NOTES
Subjective   Patient ID: Emma Villela is a 69 y.o. female who presents for Hypertension, Hyperlipidemia, Shortness of Breath (Today on exertion), and Immunizations (Deferred flu at this time).    - Recent blood work reviewed  -Hypothyroid uncontrolled thyroid ultrasound showed mild increase in the thyroid nodule  Patient need to increase levothyroxine to take levothyroxine total 400 mcg daily follow-up thyroid function test in 4 weeks  Denies any thyroid pressure symptoms  - Renal insufficiency stable continue with current medication Lasix 40 mg twice a day chlorthalidone twice a week  --Chronic respiratory failure continues 2 L oxygen  - Obstructive sleep apnea continue CPAP  -- Hypertension controlled continue with carvedilol half tablet twice a day patient controlled no need to go to isosorbide at this time continue monitoring closely  - afib (s/p ablation, on warfarin), COPD (on 2L continuous oxygen), morbid obesity, CVI, HTN, hypothyroidism and AAA (s/p EVAR 2016) now s/p right renal artery stenting (6x59m [place] VBX), aortic stent graft placement ([id number] [place] aortic cuff x2), bilateral iliac stent graft placement, and right hypogastric artery coil embolization on 1/17/2024 by Dr. Walton and Dr. Hernandez. Overnight on POD0/POD1 she developed afib RVR with hypotension requiring multiple IV metoprolol  Is still having endoleak awaiting follow-up 6 months ultrasound.  -Atrial fibrillation controlled to continue with carvedilol blood pressure controlled heart rate controlled  - Atrial fibrillation patient seen by Coumadin clinic review 1 further follow-up INR closely.  - Hypomagnesemia continue with magnesium tablet daily  - Vitamin D deficiency need to start on vitamin D 5000 daily  - Hypokalemia continue with potassium 40 mg twice a day follow-up BMP for further recommendation  -Follow-up 3 months      Hypertension  Associated symptoms include shortness of breath. Pertinent negatives include no chest pain,  headaches or palpitations.   Hyperlipidemia  Associated symptoms include shortness of breath. Pertinent negatives include no chest pain.   Shortness of Breath  Pertinent negatives include no abdominal pain, chest pain, ear pain, fever, headaches, leg swelling, rash, sore throat or wheezing.          Review of Systems   Constitutional:  Negative for fatigue, fever and unexpected weight change.   HENT:  Negative for congestion, ear discharge, ear pain, mouth sores, sinus pain and sore throat.    Eyes:  Negative for visual disturbance.   Respiratory:  Positive for shortness of breath. Negative for cough and wheezing.    Cardiovascular:  Negative for chest pain, palpitations and leg swelling.   Gastrointestinal:  Negative for abdominal pain, blood in stool and diarrhea.   Genitourinary:  Negative for difficulty urinating.   Musculoskeletal:  Negative for arthralgias.   Skin:  Negative for rash.   Neurological:  Negative for dizziness and headaches.   Hematological:  Does not bruise/bleed easily.   Psychiatric/Behavioral:  Negative for behavioral problems.    All other systems reviewed and are negative.      Objective   Lab Results   Component Value Date    HGBA1C 5.5 12/28/2023      /64   Pulse 109   Temp 35.9 °C (96.7 °F)   Wt (!) 151 kg (332 lb)   LMP  (LMP Unknown)   SpO2 97%   BMI 52.00 kg/m²   Lab Results   Component Value Date    WBC 6.9 06/12/2024    HGB 10.2 (L) 06/12/2024    HCT 32.7 (L) 06/12/2024     06/12/2024    CHOL 133 06/12/2024    TRIG 129 06/12/2024    HDL 25.8 06/12/2024    ALT 10 06/12/2024    AST 17 06/12/2024     08/06/2024    K 3.9 08/06/2024    CL 96 (L) 08/06/2024    CREATININE 1.30 (H) 08/06/2024    BUN 24 (H) 08/06/2024    CO2 40 (HH) 08/06/2024    TSH 6.39 (H) 06/12/2024    INR 3.10 (A) 09/05/2024    HGBA1C 5.5 12/28/2023     par   Physical Exam  Vitals and nursing note reviewed.   Constitutional:       Appearance: Normal appearance.   HENT:      Head: Normocephalic.       Nose: Nose normal.   Eyes:      Conjunctiva/sclera: Conjunctivae normal.      Pupils: Pupils are equal, round, and reactive to light.   Cardiovascular:      Rate and Rhythm: Regular rhythm.   Pulmonary:      Effort: Pulmonary effort is normal.      Breath sounds: Normal breath sounds.   Abdominal:      General: Abdomen is flat.      Palpations: Abdomen is soft.   Musculoskeletal:      Cervical back: Neck supple.      Right lower leg: Edema present.      Left lower leg: Edema present.   Skin:     General: Skin is warm.   Neurological:      General: No focal deficit present.      Mental Status: She is oriented to person, place, and time.   Psychiatric:         Mood and Affect: Mood normal.         Assessment/Plan   Emma was seen today for hypertension, hyperlipidemia, shortness of breath and immunizations.  Diagnoses and all orders for this visit:  Hypothyroidism, postsurgical (Primary)  -     levothyroxine (Synthroid, Unithroid) 300 mcg tablet; Take 1 tablet (300 mcg) by mouth early in the morning.. Take 300 mcg tablet in addition to 100 mcg tablet, total 400 mcg daily on an empty stomach at the same time each day, either 30 to 60 minutes prior to breakfast  -     levothyroxine (Synthroid, Levoxyl) 100 mcg tablet; Take 1 tablet (100 mcg) by mouth early in the morning.. Take 100 mcg in addition to 300 mcg daily total 400 mcg daily on an empty stomach at the same time each day, either 30 to 60 minutes prior to breakfast  Other fatigue  -     TSH with reflex to Free T4 if abnormal; Future  Stage 3a chronic kidney disease (Multi)  CHF (NYHA class III, ACC/AHA stage C) (Multi)  Vertigo  Hypercholesteremia  Hypertension, unspecified type  Other orders  -     Follow Up In Primary Care - Established; Future   - Recent blood work reviewed  -Hypothyroid uncontrolled thyroid ultrasound showed mild increase in the thyroid nodule  Patient need to increase levothyroxine to take levothyroxine total 400 mcg daily follow-up  thyroid function test in 4 weeks  Denies any thyroid pressure symptoms  - Renal insufficiency stable continue with current medication Lasix 40 mg twice a day chlorthalidone twice a week  --Chronic respiratory failure continues 2 L oxygen  - Obstructive sleep apnea continue CPAP  -- Hypertension controlled continue with carvedilol half tablet twice a day patient controlled no need to go to isosorbide at this time continue monitoring closely  - afib (s/p ablation, on warfarin), COPD (on 2L continuous oxygen), morbid obesity, CVI, HTN, hypothyroidism and AAA (s/p EVAR 2016) now s/p right renal artery stenting (6x59m [place] VBX), aortic stent graft placement ([id number] [place] aortic cuff x2), bilateral iliac stent graft placement, and right hypogastric artery coil embolization on 1/17/2024 by Dr. Walton and Dr. Hernandez. Overnight on POD0/POD1 she developed afib RVR with hypotension requiring multiple IV metoprolol  Is still having endoleak awaiting follow-up 6 months ultrasound.  -Atrial fibrillation controlled to continue with carvedilol blood pressure controlled heart rate controlled  - Atrial fibrillation patient seen by Coumadin clinic review 1 further follow-up INR closely.  - Hypomagnesemia continue with magnesium tablet daily  - Vitamin D deficiency need to start on vitamin D 5000 daily  - Hypokalemia continue with potassium 40 mg twice a day follow-up BMP for further recommendation  -Follow-up 3 months

## 2024-09-16 LAB
INR IN PPP BY COAGULATION ASSAY EXTERNAL: 2.5
PROTHROMBIN TIME (PT) IN PPP BY COAGULATION ASSAY EXTERNAL: NORMAL

## 2024-09-17 ENCOUNTER — ANTICOAGULATION - WARFARIN VISIT (OUTPATIENT)
Dept: PHARMACY | Facility: HOSPITAL | Age: 69
End: 2024-09-17
Payer: MEDICARE

## 2024-09-17 DIAGNOSIS — I48.91 ATRIAL FIBRILLATION, UNSPECIFIED TYPE (MULTI): Primary | ICD-10-CM

## 2024-09-17 NOTE — PROGRESS NOTES
SUBJECTIVE    Emma Villela is a 69 y.o. female who is enrolled in the [facility] for anticoagulation management.     Referring Provider: Dr. Ti Nolan  INR Goal: 2.0-3.0  Indication: Atrial Fibrillation/Atrial Flutter    OBJECTIVE    Lab Results   Component Value Date    INR 2.50 09/15/2024    INR 3.10 (A) 09/05/2024    INR 2.60 (N) 08/29/2024    PROTIME 21.3 (H) 01/21/2024    PROTIME 18.2 (H) 01/20/2024    PROTIME 16.2 (H) 01/19/2024       ASSESSMENT AND PLAN     Current INR is Therapeutic at 2.5. Previous INR was Supratherapeutic at 3.1. Plan to continue current warfarin regimen of  3.75 mg on Thursdays and 7.5 mg all other days. Follow up with INR check at home in one week.     Nataliia Duggan, NolaD

## 2024-09-19 ENCOUNTER — HOSPITAL ENCOUNTER (OUTPATIENT)
Dept: RADIOLOGY | Facility: HOSPITAL | Age: 69
Discharge: HOME | End: 2024-09-19
Payer: MEDICARE

## 2024-09-19 VITALS — BODY MASS INDEX: 45.99 KG/M2 | WEIGHT: 293 LBS | HEIGHT: 67 IN

## 2024-09-19 DIAGNOSIS — Z12.31 ENCOUNTER FOR SCREENING MAMMOGRAM FOR BREAST CANCER: ICD-10-CM

## 2024-09-19 PROCEDURE — 77067 SCR MAMMO BI INCL CAD: CPT

## 2024-09-19 PROCEDURE — 77063 BREAST TOMOSYNTHESIS BI: CPT | Performed by: RADIOLOGY

## 2024-09-19 PROCEDURE — 77067 SCR MAMMO BI INCL CAD: CPT | Performed by: RADIOLOGY

## 2024-09-22 LAB
INR IN PPP BY COAGULATION ASSAY EXTERNAL: 2.2 (ref 2–3)
PROTHROMBIN TIME (PT) IN PPP BY COAGULATION ASSAY EXTERNAL: ABNORMAL

## 2024-09-25 ENCOUNTER — ANTICOAGULATION - WARFARIN VISIT (OUTPATIENT)
Dept: PHARMACY | Facility: HOSPITAL | Age: 69
End: 2024-09-25
Payer: MEDICARE

## 2024-09-25 DIAGNOSIS — I48.91 ATRIAL FIBRILLATION, UNSPECIFIED TYPE (MULTI): Primary | ICD-10-CM

## 2024-09-25 NOTE — PROGRESS NOTES
SUBJECTIVE    Emma Villela is a 69 y.o. female who is enrolled in the [facility] for anticoagulation management.     Referring Provider: Dr. Ti Nolan   INR Goal: 2.0-3.0  Indication: Atrial Fibrillation/Atrial Flutter    OBJECTIVE    Lab Results   Component Value Date    INR 2.50 09/15/2024    INR 3.10 (A) 09/05/2024    INR 2.60 (N) 08/29/2024    PROTIME 21.3 (H) 01/21/2024    PROTIME 18.2 (H) 01/20/2024    PROTIME 16.2 (H) 01/19/2024       ASSESSMENT AND PLAN     Current INR is Therapeutic at 2.2. Previous INR was Therapeutic at 2.5. Plan to continue current warfarin regimen of 3.75 mg on Thursdays and 7.5 mg all other days. Follow up with INR check at home in one week.     Katja Chisholm, PharmD

## 2024-09-30 ENCOUNTER — ANTICOAGULATION - WARFARIN VISIT (OUTPATIENT)
Dept: PHARMACY | Facility: HOSPITAL | Age: 69
End: 2024-09-30
Payer: MEDICARE

## 2024-09-30 DIAGNOSIS — I48.91 ATRIAL FIBRILLATION, UNSPECIFIED TYPE (MULTI): Primary | ICD-10-CM

## 2024-09-30 LAB
INR IN PPP BY COAGULATION ASSAY EXTERNAL: 2.8 (ref 2–3)
PROTHROMBIN TIME (PT) IN PPP BY COAGULATION ASSAY EXTERNAL: ABNORMAL

## 2024-09-30 NOTE — PROGRESS NOTES
SUBJECTIVE    Emma Villela is a 69 y.o. female who is enrolled in the [facility] for anticoagulation management.     Referring Provider: Dr. Ti Nolan   INR Goal: 2.0-3.0  Indication: Atrial Fibrillation/Atrial Flutter    OBJECTIVE    Lab Results   Component Value Date    INR 2.80 (N) 09/30/2024    INR 2.20 (N) 09/22/2024    INR 2.50 09/15/2024    PROTIME 21.3 (H) 01/21/2024    PROTIME 18.2 (H) 01/20/2024    PROTIME 16.2 (H) 01/19/2024       ASSESSMENT AND PLAN     Current INR is Therapeutic at 2.8. Previous INR was Therapeutic at 2.2. Plan to continue current warfarin regimen of 3.75 mg on Thursdays and 7.5 mg all other days. Follow up with INR check at home in one week.     Katja Chisholm, PharmD

## 2024-10-03 ENCOUNTER — ANTICOAGULATION - WARFARIN VISIT (OUTPATIENT)
Dept: PHARMACY | Facility: HOSPITAL | Age: 69
End: 2024-10-03
Payer: MEDICARE

## 2024-10-03 DIAGNOSIS — I48.91 ATRIAL FIBRILLATION, UNSPECIFIED TYPE (MULTI): Primary | ICD-10-CM

## 2024-10-03 NOTE — PROGRESS NOTES
Subjective     Emma Villela is a 69 y.o. female who presents for anticoagulation follow up.     Location: [facility]     Referring Provider: Dr. Ti Nolan   INR Goal: 2.0-3.0  Indication: Atrial Fibrillation/Atrial Flutter    Bleeding signs/symptoms: No  Bruising: No   Major bleeding event: No  Thrombosis signs/symptoms: No  Thromboembolic event: No  Missed doses: No  Extra doses: No  Medication changes: No  Dietary changes: No  Change in health: No  Change in activity: No  Alcohol: No  Other concerns: No  Upcoming Surgeries: no  Parenteral anticoagulation: no    Current Outpatient Medications on File Prior to Visit   Medication Sig Dispense Refill    acetaminophen (Tylenol) 325 mg tablet Take 1 tablet (325 mg) by mouth every 4 hours if needed.      albuterol 90 mcg/actuation inhaler Inhale 2 puffs every 6 hours if needed.      amitriptyline (Elavil) 25 mg tablet TAKE ONE TABLET BY MOUTH AT BEDTIME 90 tablet 0    aspirin 81 mg EC tablet Take 1 tablet (81 mg) by mouth once daily.      atorvastatin (Lipitor) 40 mg tablet TAKE ONE TABLET BY MOUTH DAILY 90 tablet 1    budesonide-formoteroL (Symbicort) 160-4.5 mcg/actuation inhaler Inhale 2 puffs 2 times a day. 10.2 g 11    carvedilol (Coreg) 25 mg tablet Take 0.5 tablets (12.5 mg) by mouth 2 times a day with meals. Take with food. 180 tablet 0    cholecalciferol (Vitamin D-3) 125 MCG (5000 UT) capsule TAKE ONE CAPSULE BY MOUTH DAILY 90 capsule 1    ferrous gluconate 324 (38 Fe) MG tablet TAKE ONE TABLET BY MOUTH DAILY 90 tablet 1    furosemide (Lasix) 40 mg tablet Take 1 tablet (40 mg) by mouth 2 times a day. 180 tablet 1    hydrALAZINE (Apresoline) 100 mg tablet TAKE ONE TABLET BY MOUTH THREE TIMES A  tablet 1    levothyroxine (Synthroid, Levoxyl) 100 mcg tablet Take 1 tablet (100 mcg) by mouth early in the morning.. Take 100 mcg in addition to 300 mcg daily total 400 mcg daily on an empty stomach at the same time each  day, either 30 to 60 minutes prior to breakfast 90 tablet 3    levothyroxine (Synthroid, Unithroid) 300 mcg tablet Take 1 tablet (300 mcg) by mouth early in the morning.. Take 300 mcg tablet in addition to 100 mcg tablet, total 400 mcg daily on an empty stomach at the same time each day, either 30 to 60 minutes prior to breakfast 90 tablet 3    losartan (Cozaar) 100 mg tablet TAKE ONE TABLET BY MOUTH DAILY 90 tablet 0    magnesium oxide (Mag-Ox) 400 mg (241.3 mg magnesium) tablet TAKE ONE TABLET BY MOUTH TWICE A DAY AFTER MEALS 180 tablet 1    meclizine (Antivert) 25 mg tablet TAKE ONE TABLET BY MOUTH AT BEDTIME 30 tablet 2    metOLazone (Zaroxolyn) 5 mg tablet Take 1 tablet (5 mg) by mouth. Twice a week      oxybutynin XL (Ditropan-XL) 15 mg 24 hr tablet Take 1 tablet (15 mg) by mouth once daily.      pantoprazole (ProtoNix) 40 mg EC tablet TAKE ONE TABLET BY MOUTH DAILY 90 tablet 1    potassium chloride CR 20 mEq ER tablet TAKE ONE TABLET BY MOUTH EVERY 12 HOURS 180 tablet 1    warfarin (Jantoven) 7.5 mg tablet TAKE ONE TABLET BY MOUTH EVERY DAY 90 tablet 1     No current facility-administered medications on file prior to visit.        Objective   Anticoagulation Summary  As of 10/3/2024      INR goal:  2.0-3.0   TTR:  82.4% (10.6 mo)   INR used for dosing:  3.10 (10/3/2024)   Weekly warfarin total:  48.75 mg             Lab Results   Component Value Date    INR 3.10 (A) 10/03/2024    INR 2.80 (N) 09/30/2024    INR 2.20 (N) 09/22/2024    PROTIME 21.3 (H) 01/21/2024    PROTIME 18.2 (H) 01/20/2024    PROTIME 16.2 (H) 01/19/2024       Assessment/Plan   Current INR is Supratherapeutic at 3.1. Previous INR was Therapeutic at 2.8. Patient denies any changes from previous visit. Advised patient to hold warfarin x1 dose today, then continue current warfarin regimen of 3.75 mg on Thursdays and 7.5 mg all other days.     Follow Up: 1 week - home INR     Katja Chisholm, PharmD

## 2024-10-10 ENCOUNTER — LAB (OUTPATIENT)
Dept: LAB | Facility: LAB | Age: 69
End: 2024-10-10
Payer: MEDICARE

## 2024-10-10 ENCOUNTER — ANTICOAGULATION - WARFARIN VISIT (OUTPATIENT)
Dept: PHARMACY | Facility: HOSPITAL | Age: 69
End: 2024-10-10

## 2024-10-10 DIAGNOSIS — I48.91 ATRIAL FIBRILLATION, UNSPECIFIED TYPE (MULTI): Primary | ICD-10-CM

## 2024-10-10 DIAGNOSIS — R53.83 OTHER FATIGUE: ICD-10-CM

## 2024-10-10 LAB
INR IN PPP BY COAGULATION ASSAY EXTERNAL: 3.9 (ref 2–3)
PROTHROMBIN TIME (PT) IN PPP BY COAGULATION ASSAY EXTERNAL: ABNORMAL
T4 FREE SERPL-MCNC: 2.19 NG/DL (ref 0.61–1.12)
TSH SERPL-ACNC: 0.01 MIU/L (ref 0.44–3.98)

## 2024-10-10 PROCEDURE — 36415 COLL VENOUS BLD VENIPUNCTURE: CPT

## 2024-10-10 NOTE — PROGRESS NOTES
Subjective     Emma Villela is a 69 y.o. female who presents for anticoagulation follow up.     Location: [facility]     Referring Provider: Dr. Ti Nolan  INR Goal: 2.0-3.0  Indication: Atrial Fibrillation/Atrial Flutter    Bleeding signs/symptoms: No  Bruising: No   Major bleeding event: No  Thrombosis signs/symptoms: No  Thromboembolic event: No  Missed doses: No  Extra doses: No  Medication changes: Yes  increase levothyroxine  Dietary changes: No  Change in health: No  Change in activity: No  Alcohol: No  Other concerns: No  Upcoming Surgeries: no  Parenteral anticoagulation: no    Current Outpatient Medications on File Prior to Visit   Medication Sig Dispense Refill    acetaminophen (Tylenol) 325 mg tablet Take 1 tablet (325 mg) by mouth every 4 hours if needed.      albuterol 90 mcg/actuation inhaler Inhale 2 puffs every 6 hours if needed.      amitriptyline (Elavil) 25 mg tablet TAKE ONE TABLET BY MOUTH AT BEDTIME 90 tablet 0    aspirin 81 mg EC tablet Take 1 tablet (81 mg) by mouth once daily.      atorvastatin (Lipitor) 40 mg tablet TAKE ONE TABLET BY MOUTH DAILY 90 tablet 1    budesonide-formoteroL (Symbicort) 160-4.5 mcg/actuation inhaler Inhale 2 puffs 2 times a day. 10.2 g 11    carvedilol (Coreg) 25 mg tablet Take 0.5 tablets (12.5 mg) by mouth 2 times a day with meals. Take with food. 180 tablet 0    cholecalciferol (Vitamin D-3) 125 MCG (5000 UT) capsule TAKE ONE CAPSULE BY MOUTH DAILY 90 capsule 1    ferrous gluconate 324 (38 Fe) MG tablet TAKE ONE TABLET BY MOUTH DAILY 90 tablet 1    furosemide (Lasix) 40 mg tablet Take 1 tablet (40 mg) by mouth 2 times a day. 180 tablet 1    hydrALAZINE (Apresoline) 100 mg tablet TAKE ONE TABLET BY MOUTH THREE TIMES A  tablet 1    levothyroxine (Synthroid, Levoxyl) 100 mcg tablet Take 1 tablet (100 mcg) by mouth early in the morning.. Take 100 mcg in addition to 300 mcg daily total 400 mcg daily on an empty  stomach at the same time each day, either 30 to 60 minutes prior to breakfast 90 tablet 3    levothyroxine (Synthroid, Unithroid) 300 mcg tablet Take 1 tablet (300 mcg) by mouth early in the morning.. Take 300 mcg tablet in addition to 100 mcg tablet, total 400 mcg daily on an empty stomach at the same time each day, either 30 to 60 minutes prior to breakfast 90 tablet 3    losartan (Cozaar) 100 mg tablet TAKE ONE TABLET BY MOUTH DAILY 90 tablet 0    magnesium oxide (Mag-Ox) 400 mg (241.3 mg magnesium) tablet TAKE ONE TABLET BY MOUTH TWICE A DAY AFTER MEALS 180 tablet 1    meclizine (Antivert) 25 mg tablet TAKE ONE TABLET BY MOUTH AT BEDTIME 30 tablet 2    metOLazone (Zaroxolyn) 5 mg tablet Take 1 tablet (5 mg) by mouth. Twice a week      oxybutynin XL (Ditropan-XL) 15 mg 24 hr tablet Take 1 tablet (15 mg) by mouth once daily.      pantoprazole (ProtoNix) 40 mg EC tablet TAKE ONE TABLET BY MOUTH DAILY 90 tablet 1    potassium chloride CR 20 mEq ER tablet TAKE ONE TABLET BY MOUTH EVERY 12 HOURS 180 tablet 1    warfarin (Jantoven) 7.5 mg tablet TAKE ONE TABLET BY MOUTH EVERY DAY 90 tablet 1     No current facility-administered medications on file prior to visit.        Objective   Anticoagulation Summary  As of 10/10/2024      INR goal:  2.0-3.0   TTR:  80.6% (10.8 mo)   INR used for dosing:  3.90 (10/10/2024)   Weekly warfarin total:  45 mg             Lab Results   Component Value Date    INR 3.90 (A) 10/10/2024    INR 3.10 (A) 10/03/2024    INR 2.80 (N) 09/30/2024    PROTIME 21.3 (H) 01/21/2024    PROTIME 18.2 (H) 01/20/2024    PROTIME 16.2 (H) 01/19/2024       Assessment/Plan   Current INR is Supratherapeutic at 3.9. Previous INR was Supratherapeutic at 3.1. Patient reports increase in thyroid medication (levothyroxine) from 300 mcg to 400 mcg daily. Levothyroxine has a category C DDI with warfarin and may enhance the anticoagulant effects. Advised patient to lower current warfarin regimen to 3.75 mg on Tuesdays  and Thursdays and 7.5 mg all other days.     Follow Up: 1 week - home INR check     Katja Chisholm, NolaD

## 2024-10-17 ENCOUNTER — ANTICOAGULATION - WARFARIN VISIT (OUTPATIENT)
Dept: PHARMACY | Facility: HOSPITAL | Age: 69
End: 2024-10-17
Payer: MEDICARE

## 2024-10-17 DIAGNOSIS — I48.91 ATRIAL FIBRILLATION, UNSPECIFIED TYPE (MULTI): Primary | ICD-10-CM

## 2024-10-17 DIAGNOSIS — K29.90 GASTRODUODENITIS: ICD-10-CM

## 2024-10-17 DIAGNOSIS — I10 BENIGN ESSENTIAL HYPERTENSION: ICD-10-CM

## 2024-10-17 LAB
INR IN PPP BY COAGULATION ASSAY EXTERNAL: 2.4 (ref 2–3)
PROTHROMBIN TIME (PT) IN PPP BY COAGULATION ASSAY EXTERNAL: ABNORMAL

## 2024-10-17 RX ORDER — AMITRIPTYLINE HYDROCHLORIDE 25 MG/1
25 TABLET, FILM COATED ORAL NIGHTLY
Qty: 90 TABLET | Refills: 0 | Status: SHIPPED | OUTPATIENT
Start: 2024-10-17

## 2024-10-17 RX ORDER — CARVEDILOL 25 MG/1
TABLET ORAL
Qty: 180 TABLET | Refills: 0 | Status: SHIPPED | OUTPATIENT
Start: 2024-10-17

## 2024-10-17 NOTE — PROGRESS NOTES
SUBJECTIVE    Emma Villela is a 69 y.o. female who is enrolled in the [facility] for anticoagulation management.     Referring Provider: Dr. Ti Nolan   INR Goal: 2.0-3.0  Indication: Atrial Fibrillation/Atrial Flutter    OBJECTIVE    Lab Results   Component Value Date    INR 3.90 (A) 10/10/2024    INR 3.10 (A) 10/03/2024    INR 2.80 (N) 09/30/2024    PROTIME 21.3 (H) 01/21/2024    PROTIME 18.2 (H) 01/20/2024    PROTIME 16.2 (H) 01/19/2024       ASSESSMENT AND PLAN     Current INR is Therapeutic at 2.4. Previous INR was Supratherapeutic at 3.9. Plan to continue current warfarin regimen of 3.75 mg on Tue/Thur and 7.5 mg all other days. Follow up with INR check at home in one week.     Katja Chisholm, NolaD

## 2024-10-24 ENCOUNTER — ANTICOAGULATION - WARFARIN VISIT (OUTPATIENT)
Dept: PHARMACY | Facility: HOSPITAL | Age: 69
End: 2024-10-24
Payer: MEDICARE

## 2024-10-24 DIAGNOSIS — I48.91 ATRIAL FIBRILLATION, UNSPECIFIED TYPE (MULTI): Primary | ICD-10-CM

## 2024-10-24 LAB
INR IN PPP BY COAGULATION ASSAY EXTERNAL: 3.9 (ref 2–3)
PROTHROMBIN TIME (PT) IN PPP BY COAGULATION ASSAY EXTERNAL: ABNORMAL

## 2024-10-24 NOTE — PROGRESS NOTES
Subjective     Emma Villela is a 69 y.o. female who presents for anticoagulation follow up.     Location: [facility]     Referring Provider: Dr. Ti Nolan   INR Goal: 2.0-3.0  Indication: Atrial Fibrillation/Atrial Flutter    Bleeding signs/symptoms: No  Bruising: No   Major bleeding event: No  Thrombosis signs/symptoms: No  Thromboembolic event: No  Missed doses: No  Extra doses: No  Medication changes: No  Dietary changes: No  Change in health: No  Change in activity: No  Alcohol: No  Other concerns: No  Upcoming Surgeries: no  Parenteral anticoagulation: no    Current Outpatient Medications on File Prior to Visit   Medication Sig Dispense Refill    acetaminophen (Tylenol) 325 mg tablet Take 1 tablet (325 mg) by mouth every 4 hours if needed.      albuterol 90 mcg/actuation inhaler Inhale 2 puffs every 6 hours if needed.      amitriptyline (Elavil) 25 mg tablet TAKE ONE TABLET BY MOUTH AT BEDTIME 90 tablet 0    aspirin 81 mg EC tablet Take 1 tablet (81 mg) by mouth once daily.      atorvastatin (Lipitor) 40 mg tablet TAKE ONE TABLET BY MOUTH DAILY 90 tablet 1    budesonide-formoteroL (Symbicort) 160-4.5 mcg/actuation inhaler Inhale 2 puffs 2 times a day. 10.2 g 11    carvedilol (Coreg) 25 mg tablet TAKE 1/2 TABLETS BY MOUTH TWICE DAILY WITH MEALS 180 tablet 0    cholecalciferol (Vitamin D-3) 125 MCG (5000 UT) capsule TAKE ONE CAPSULE BY MOUTH DAILY 90 capsule 1    ferrous gluconate 324 (38 Fe) MG tablet TAKE ONE TABLET BY MOUTH DAILY 90 tablet 1    furosemide (Lasix) 40 mg tablet Take 1 tablet (40 mg) by mouth 2 times a day. 180 tablet 1    hydrALAZINE (Apresoline) 100 mg tablet TAKE ONE TABLET BY MOUTH THREE TIMES A  tablet 1    levothyroxine (Synthroid, Levoxyl) 100 mcg tablet Take 1 tablet (100 mcg) by mouth early in the morning.. Take 100 mcg in addition to 300 mcg daily total 400 mcg daily on an empty stomach at the same time each day, either 30 to 60 minutes  prior to breakfast 90 tablet 3    levothyroxine (Synthroid, Unithroid) 300 mcg tablet Take 1 tablet (300 mcg) by mouth early in the morning.. Take 300 mcg tablet in addition to 100 mcg tablet, total 400 mcg daily on an empty stomach at the same time each day, either 30 to 60 minutes prior to breakfast 90 tablet 3    losartan (Cozaar) 100 mg tablet TAKE ONE TABLET BY MOUTH DAILY 90 tablet 0    magnesium oxide (Mag-Ox) 400 mg (241.3 mg magnesium) tablet TAKE ONE TABLET BY MOUTH TWICE A DAY AFTER MEALS 180 tablet 1    meclizine (Antivert) 25 mg tablet TAKE ONE TABLET BY MOUTH AT BEDTIME 30 tablet 2    metOLazone (Zaroxolyn) 5 mg tablet Take 1 tablet (5 mg) by mouth. Twice a week      oxybutynin XL (Ditropan-XL) 15 mg 24 hr tablet Take 1 tablet (15 mg) by mouth once daily.      pantoprazole (ProtoNix) 40 mg EC tablet TAKE ONE TABLET BY MOUTH DAILY 90 tablet 1    potassium chloride CR 20 mEq ER tablet TAKE ONE TABLET BY MOUTH EVERY 12 HOURS 180 tablet 1    warfarin (Jantoven) 7.5 mg tablet TAKE ONE TABLET BY MOUTH EVERY DAY 90 tablet 1     No current facility-administered medications on file prior to visit.        Objective   Anticoagulation Summary  As of 10/24/2024      INR goal:  2.0-3.0   TTR:  79.0% (11.3 mo)   INR used for dosing:  3.90 (10/24/2024)   Weekly warfarin total:  41.25 mg             Lab Results   Component Value Date    INR 3.90 (A) 10/24/2024    INR 2.40 (N) 10/17/2024    INR 3.90 (A) 10/10/2024    PROTIME 21.3 (H) 01/21/2024    PROTIME 18.2 (H) 01/20/2024    PROTIME 16.2 (H) 01/19/2024       Assessment/Plan   Current INR is Supratherapeutic at 3.9. Previous INR was Therapeutic at 2.4. No changes reported from previous visit. Advised patient to hold warfarin x1 dose today, then lower current warfarin regimen to 3.75 mg on Tue/Thur/Sat and 7.5 mg all other days.     Follow Up: 1 week    Katja Chisholm, PharmD

## 2024-10-30 DIAGNOSIS — E83.42 HYPOMAGNESEMIA: ICD-10-CM

## 2024-10-30 RX ORDER — LANOLIN ALCOHOL/MO/W.PET/CERES
CREAM (GRAM) TOPICAL
Qty: 180 TABLET | Refills: 0 | Status: SHIPPED | OUTPATIENT
Start: 2024-10-30

## 2024-10-31 ENCOUNTER — ANTICOAGULATION - WARFARIN VISIT (OUTPATIENT)
Dept: PHARMACY | Facility: HOSPITAL | Age: 69
End: 2024-10-31
Payer: MEDICARE

## 2024-10-31 DIAGNOSIS — I48.91 ATRIAL FIBRILLATION, UNSPECIFIED TYPE (MULTI): Primary | ICD-10-CM

## 2024-10-31 LAB
INR IN PPP BY COAGULATION ASSAY EXTERNAL: 2.1 (ref 2–3)
PROTHROMBIN TIME (PT) IN PPP BY COAGULATION ASSAY EXTERNAL: ABNORMAL

## 2024-11-07 ENCOUNTER — ANTICOAGULATION - WARFARIN VISIT (OUTPATIENT)
Dept: PHARMACY | Facility: HOSPITAL | Age: 69
End: 2024-11-07
Payer: MEDICARE

## 2024-11-07 DIAGNOSIS — I48.91 ATRIAL FIBRILLATION, UNSPECIFIED TYPE (MULTI): Primary | ICD-10-CM

## 2024-11-07 LAB
POC INR: 2.4 (ref 2–3)
POC PROTHROMBIN TIME: ABNORMAL

## 2024-11-07 NOTE — PROGRESS NOTES
SUBJECTIVE    Emma Villela is a 69 y.o. female who is enrolled in the [facility] for anticoagulation management.     Referring Provider: Dr. Ti Nolan  INR Goal: 2.0-3.0  Indication: Atrial Fibrillation/Atrial Flutter    OBJECTIVE    Lab Results   Component Value Date    INR 2.10 (N) 10/31/2024    INR 3.90 (A) 10/24/2024    INR 2.40 (N) 10/17/2024    PROTIME 21.3 (H) 01/21/2024    PROTIME 18.2 (H) 01/20/2024    PROTIME 16.2 (H) 01/19/2024       ASSESSMENT AND PLAN     Current INR is Therapeutic at 2.4. Previous INR was Therapeutic at 2.1. Plan to continue current warfarin regimen of 3.75 mg on Tue/Thur/Sat and 7.5 mg all other days. Follow up with INR check at home in one week.     Katja Chisholm, PharmD

## 2024-11-14 ENCOUNTER — ANTICOAGULATION - WARFARIN VISIT (OUTPATIENT)
Dept: PHARMACY | Facility: HOSPITAL | Age: 69
End: 2024-11-14
Payer: MEDICARE

## 2024-11-14 DIAGNOSIS — I48.91 ATRIAL FIBRILLATION, UNSPECIFIED TYPE (MULTI): Primary | ICD-10-CM

## 2024-11-14 LAB
INR IN PPP BY COAGULATION ASSAY EXTERNAL: 3.8 (ref 2–3)
PROTHROMBIN TIME (PT) IN PPP BY COAGULATION ASSAY EXTERNAL: ABNORMAL

## 2024-11-14 NOTE — PROGRESS NOTES
Subjective     Emma Villela is a 69 y.o. female who presents for anticoagulation follow up.     Location: [facility]     Referring Provider: Dr. Ti Nolan   INR Goal: 2.0-3.0  Indication: Atrial Fibrillation/Atrial Flutter    Bleeding signs/symptoms: No  Bruising: No   Major bleeding event: No  Thrombosis signs/symptoms: No  Thromboembolic event: No  Missed doses: No  Extra doses: No  Medication changes: No  Dietary changes: Yes  less vitamin K  Change in health: No  Change in activity: No  Alcohol: No  Other concerns: No  Upcoming Surgeries: no  Parenteral anticoagulation: no    Current Outpatient Medications on File Prior to Visit   Medication Sig Dispense Refill    acetaminophen (Tylenol) 325 mg tablet Take 1 tablet (325 mg) by mouth every 4 hours if needed.      albuterol 90 mcg/actuation inhaler Inhale 2 puffs every 6 hours if needed.      amitriptyline (Elavil) 25 mg tablet TAKE ONE TABLET BY MOUTH AT BEDTIME 90 tablet 0    aspirin 81 mg EC tablet Take 1 tablet (81 mg) by mouth once daily.      atorvastatin (Lipitor) 40 mg tablet TAKE ONE TABLET BY MOUTH DAILY 90 tablet 1    budesonide-formoteroL (Symbicort) 160-4.5 mcg/actuation inhaler Inhale 2 puffs 2 times a day. 10.2 g 11    carvedilol (Coreg) 25 mg tablet TAKE 1/2 TABLETS BY MOUTH TWICE DAILY WITH MEALS 180 tablet 0    cholecalciferol (Vitamin D-3) 125 MCG (5000 UT) capsule TAKE ONE CAPSULE BY MOUTH DAILY 90 capsule 1    ferrous gluconate 324 (38 Fe) MG tablet TAKE ONE TABLET BY MOUTH DAILY 90 tablet 1    furosemide (Lasix) 40 mg tablet Take 1 tablet (40 mg) by mouth 2 times a day. 180 tablet 1    hydrALAZINE (Apresoline) 100 mg tablet TAKE ONE TABLET BY MOUTH THREE TIMES A  tablet 1    levothyroxine (Synthroid, Levoxyl) 100 mcg tablet Take 1 tablet (100 mcg) by mouth early in the morning.. Take 100 mcg in addition to 300 mcg daily total 400 mcg daily on an empty stomach at the same time each day, either  30 to 60 minutes prior to breakfast 90 tablet 3    levothyroxine (Synthroid, Unithroid) 300 mcg tablet Take 1 tablet (300 mcg) by mouth early in the morning.. Take 300 mcg tablet in addition to 100 mcg tablet, total 400 mcg daily on an empty stomach at the same time each day, either 30 to 60 minutes prior to breakfast 90 tablet 3    losartan (Cozaar) 100 mg tablet TAKE ONE TABLET BY MOUTH DAILY 90 tablet 0    magnesium oxide (Mag-Ox) 400 mg (241.3 mg magnesium) tablet TAKE ONE TABLET BY MOUTH TWICE A DAY AFTER MEALS 180 tablet 0    meclizine (Antivert) 25 mg tablet TAKE ONE TABLET BY MOUTH AT BEDTIME 30 tablet 2    metOLazone (Zaroxolyn) 5 mg tablet Take 1 tablet (5 mg) by mouth. Twice a week      oxybutynin XL (Ditropan-XL) 15 mg 24 hr tablet Take 1 tablet (15 mg) by mouth once daily.      pantoprazole (ProtoNix) 40 mg EC tablet TAKE ONE TABLET BY MOUTH DAILY 90 tablet 1    potassium chloride CR 20 mEq ER tablet TAKE ONE TABLET BY MOUTH EVERY 12 HOURS 180 tablet 1    warfarin (Jantoven) 7.5 mg tablet TAKE ONE TABLET BY MOUTH EVERY DAY 90 tablet 1     No current facility-administered medications on file prior to visit.        Objective   Anticoagulation Summary  As of 11/14/2024      INR goal:  2.0-3.0   TTR:  78.2% (1 y)   INR used for dosing:  3.80 (11/14/2024)   Weekly warfarin total:  41.25 mg             Lab Results   Component Value Date    INR 3.80 (A) 11/14/2024    INR 2.40 (N) 11/07/2024    INR 2.10 (N) 10/31/2024    INR 3.90 (A) 10/24/2024    PROTIME 21.3 (H) 01/21/2024    PROTIME 18.2 (H) 01/20/2024    PROTIME 16.2 (H) 01/19/2024       Assessment/Plan   Current INR is Supratherapeutic at 3.8. Previous INR was Therapeutic at 2.4. Eating less vitamin K over the past week. Advised patient to hold warfarin x1 dose today, then resume current warfarin regimen of 3.75 mg on Tue/Thur/Sat and 7.5 mg all other days.     Follow Up: 1 week    Katja Chisholm, PharmD

## 2024-11-21 ENCOUNTER — ANTICOAGULATION - WARFARIN VISIT (OUTPATIENT)
Dept: PHARMACY | Facility: HOSPITAL | Age: 69
End: 2024-11-21
Payer: MEDICARE

## 2024-11-21 DIAGNOSIS — I48.91 ATRIAL FIBRILLATION, UNSPECIFIED TYPE (MULTI): Primary | ICD-10-CM

## 2024-11-21 LAB
POC INR: 3.4 (ref 2–3)
POC PROTHROMBIN TIME: ABNORMAL

## 2024-11-21 NOTE — PROGRESS NOTES
Subjective     Emma Villela is a 69 y.o. female who presents for anticoagulation follow up.     Location: [facility]     Referring Provider: Dr. Ti Nolan   INR Goal: 2.0-3.0  Indication: Atrial Fibrillation/Atrial Flutter    Bleeding signs/symptoms: No  Bruising: No   Major bleeding event: No  Thrombosis signs/symptoms: No  Thromboembolic event: No  Missed doses: No  Extra doses: No  Medication changes: No  Dietary changes: No  Change in health: No  Change in activity: No  Alcohol: No  Other concerns: No  Upcoming Surgeries: no  Parenteral anticoagulation: no    Current Outpatient Medications on File Prior to Visit   Medication Sig Dispense Refill    acetaminophen (Tylenol) 325 mg tablet Take 1 tablet (325 mg) by mouth every 4 hours if needed.      albuterol 90 mcg/actuation inhaler Inhale 2 puffs every 6 hours if needed.      amitriptyline (Elavil) 25 mg tablet TAKE ONE TABLET BY MOUTH AT BEDTIME 90 tablet 0    aspirin 81 mg EC tablet Take 1 tablet (81 mg) by mouth once daily.      atorvastatin (Lipitor) 40 mg tablet TAKE ONE TABLET BY MOUTH DAILY 90 tablet 1    budesonide-formoteroL (Symbicort) 160-4.5 mcg/actuation inhaler Inhale 2 puffs 2 times a day. 10.2 g 11    carvedilol (Coreg) 25 mg tablet TAKE 1/2 TABLETS BY MOUTH TWICE DAILY WITH MEALS 180 tablet 0    cholecalciferol (Vitamin D-3) 125 MCG (5000 UT) capsule TAKE ONE CAPSULE BY MOUTH DAILY 90 capsule 1    ferrous gluconate 324 (38 Fe) MG tablet TAKE ONE TABLET BY MOUTH DAILY 90 tablet 1    furosemide (Lasix) 40 mg tablet Take 1 tablet (40 mg) by mouth 2 times a day. 180 tablet 1    hydrALAZINE (Apresoline) 100 mg tablet TAKE ONE TABLET BY MOUTH THREE TIMES A  tablet 1    levothyroxine (Synthroid, Levoxyl) 100 mcg tablet Take 1 tablet (100 mcg) by mouth early in the morning.. Take 100 mcg in addition to 300 mcg daily total 400 mcg daily on an empty stomach at the same time each day, either 30 to 60 minutes  prior to breakfast 90 tablet 3    levothyroxine (Synthroid, Unithroid) 300 mcg tablet Take 1 tablet (300 mcg) by mouth early in the morning.. Take 300 mcg tablet in addition to 100 mcg tablet, total 400 mcg daily on an empty stomach at the same time each day, either 30 to 60 minutes prior to breakfast 90 tablet 3    losartan (Cozaar) 100 mg tablet TAKE ONE TABLET BY MOUTH DAILY 90 tablet 0    magnesium oxide (Mag-Ox) 400 mg (241.3 mg magnesium) tablet TAKE ONE TABLET BY MOUTH TWICE A DAY AFTER MEALS 180 tablet 0    meclizine (Antivert) 25 mg tablet TAKE ONE TABLET BY MOUTH AT BEDTIME 30 tablet 2    metOLazone (Zaroxolyn) 5 mg tablet Take 1 tablet (5 mg) by mouth. Twice a week      oxybutynin XL (Ditropan-XL) 15 mg 24 hr tablet Take 1 tablet (15 mg) by mouth once daily.      pantoprazole (ProtoNix) 40 mg EC tablet TAKE ONE TABLET BY MOUTH DAILY 90 tablet 1    potassium chloride CR 20 mEq ER tablet TAKE ONE TABLET BY MOUTH EVERY 12 HOURS 180 tablet 1    warfarin (Jantoven) 7.5 mg tablet TAKE ONE TABLET BY MOUTH EVERY DAY 90 tablet 1     No current facility-administered medications on file prior to visit.        Objective   Anticoagulation Summary  As of 11/21/2024      INR goal:  2.0-3.0   TTR:  76.6% (1 y)   INR used for dosing:  3.40 (11/21/2024)   Weekly warfarin total:  45 mg             Lab Results   Component Value Date    INR 3.40 (A) 11/21/2024    INR 3.80 (A) 11/14/2024    INR 2.40 (N) 11/07/2024    INR 2.10 (N) 10/31/2024    INR 3.90 (A) 10/24/2024    PROTIME 21.3 (H) 01/21/2024    PROTIME 18.2 (H) 01/20/2024    PROTIME 16.2 (H) 01/19/2024       Assessment/Plan   Current INR is Supratherapeutic at 3.4. Previous INR was Supratherapeutic at 3.8. Patient reports taking a higher dose than previously recommended. Was taking 7.5 mg every day. Advised patient to lower current warfarin regimen to 3.75 mg on Tue/Thur and 7.5 mg all other days.     Follow Up: 1 week - home INR check     Katja Chisholm, NolaD

## 2024-11-25 ENCOUNTER — APPOINTMENT (OUTPATIENT)
Dept: PULMONOLOGY | Facility: CLINIC | Age: 69
End: 2024-11-25
Payer: MEDICARE

## 2024-11-26 DIAGNOSIS — I10 BENIGN ESSENTIAL HYPERTENSION: ICD-10-CM

## 2024-11-26 RX ORDER — LOSARTAN POTASSIUM 100 MG/1
100 TABLET ORAL DAILY
Qty: 90 TABLET | Refills: 1 | Status: SHIPPED | OUTPATIENT
Start: 2024-11-26

## 2024-12-09 ENCOUNTER — APPOINTMENT (OUTPATIENT)
Dept: PULMONOLOGY | Facility: CLINIC | Age: 69
End: 2024-12-09
Payer: MEDICARE

## 2024-12-12 ENCOUNTER — APPOINTMENT (OUTPATIENT)
Dept: PRIMARY CARE | Facility: CLINIC | Age: 69
End: 2024-12-12
Payer: MEDICARE

## 2024-12-12 VITALS — OXYGEN SATURATION: 99 % | HEART RATE: 66 BPM

## 2024-12-12 DIAGNOSIS — E78.00 HYPERCHOLESTEREMIA: ICD-10-CM

## 2024-12-12 DIAGNOSIS — E01.0 THYROMEGALY: Primary | ICD-10-CM

## 2024-12-12 DIAGNOSIS — R29.898 MUSCULAR DECONDITIONING: ICD-10-CM

## 2024-12-12 DIAGNOSIS — I10 HYPERTENSION, UNSPECIFIED TYPE: ICD-10-CM

## 2024-12-12 DIAGNOSIS — R29.6 RECURRENT FALLS: ICD-10-CM

## 2024-12-12 DIAGNOSIS — N18.31 STAGE 3A CHRONIC KIDNEY DISEASE (MULTI): ICD-10-CM

## 2024-12-12 DIAGNOSIS — I50.9 CHF (NYHA CLASS III, ACC/AHA STAGE C) (MULTI): ICD-10-CM

## 2024-12-12 DIAGNOSIS — E89.0 HYPOTHYROIDISM, POSTSURGICAL: ICD-10-CM

## 2024-12-12 PROCEDURE — 1160F RVW MEDS BY RX/DR IN RCRD: CPT | Performed by: INTERNAL MEDICINE

## 2024-12-12 PROCEDURE — 1157F ADVNC CARE PLAN IN RCRD: CPT | Performed by: INTERNAL MEDICINE

## 2024-12-12 PROCEDURE — 1159F MED LIST DOCD IN RCRD: CPT | Performed by: INTERNAL MEDICINE

## 2024-12-12 PROCEDURE — 99214 OFFICE O/P EST MOD 30 MIN: CPT | Performed by: INTERNAL MEDICINE

## 2024-12-12 PROCEDURE — 1036F TOBACCO NON-USER: CPT | Performed by: INTERNAL MEDICINE

## 2024-12-12 PROCEDURE — G2211 COMPLEX E/M VISIT ADD ON: HCPCS | Performed by: INTERNAL MEDICINE

## 2024-12-12 RX ORDER — FUROSEMIDE 40 MG/1
40 TABLET ORAL 2 TIMES DAILY
Qty: 180 TABLET | Refills: 1 | Status: SHIPPED | OUTPATIENT
Start: 2024-12-12

## 2024-12-12 RX ORDER — OXYBUTYNIN CHLORIDE 5 MG/1
TABLET, EXTENDED RELEASE ORAL
COMMUNITY
Start: 2024-11-17

## 2024-12-12 ASSESSMENT — ENCOUNTER SYMPTOMS
JOINT SWELLING: 1
COUGH: 0
EXTREMITY WEAKNESS: 1
DIARRHEA: 0
UNEXPECTED WEIGHT CHANGE: 0
FATIGUE: 1
ABDOMINAL PAIN: 0
DIZZINESS: 0
FEVER: 0
PALPITATIONS: 0
HEADACHES: 0
HYPERTENSION: 1
SINUS PAIN: 0
BRUISES/BLEEDS EASILY: 0
DIFFICULTY URINATING: 0
ARTHRALGIAS: 1
SORE THROAT: 0
WHEEZING: 0
BLOOD IN STOOL: 0

## 2024-12-12 NOTE — PROGRESS NOTES
Subjective   Patient ID: Emma Villela is a 69 y.o. female who presents for Hypothyroidism, Hypertension, Extremity Weakness (Requests home physical therapy), and lift chair rx.    - Recent blood work reviewed  - Slipped off her couch could not stand up due to severe muscle weakness had to call squad to come to help her  Patient comes about home physical therapy ordered today to strengthen her muscles and to improve her ambulation  -Thyromegaly having difficulty and pressure symptoms when she swallows  Patient not surgical candidate previously continue Xarelto due to difficult hemithyroidectomy  Treated with radioactive iodine twice  Recent thyroid function test TSH 0.01 takes now 400 levothyroxine daily  Need to cut down the levothyroxine to take 300 alternating with 400 every other day  Refer patient to endocrinology for further assessment and recommendation refer patient to  endocrinology    - Renal insufficiency stable continue with current medication Lasix 40 mg twice a day chlorthalidone twice a week  --Chronic respiratory failure continues 2 L oxygen  - Obstructive sleep apnea continue CPAP  -- Hypertension controlled continue with carvedilol half tablet twice a day patient controlled no need to go to isosorbide at this time continue monitoring closely  - afib (s/p ablation, on warfarin), COPD (on 2L continuous oxygen), morbid obesity, CVI, HTN, hypothyroidism and AAA (s/p EVAR 2016) now s/p right renal artery stenting (6x59m [place] VBX), aortic stent graft placement ([id number] [place] aortic cuff x2), bilateral iliac stent graft placement, and right hypogastric artery coil embolization on 1/17/2024 by Dr. Walton and Dr. Hernandez. Overnight on POD0/POD1 she developed afib RVR with hypotension requiring multiple IV metoprolol  Is still having endoleak awaiting follow-up 6 months ultrasound.  -Atrial fibrillation controlled to continue with carvedilol blood pressure controlled heart rate controlled  - Atrial  fibrillation patient seen by Coumadin clinic review 1 further follow-up INR closely.  - Hypomagnesemia continue with magnesium tablet daily  - Vitamin D deficiency need to start on vitamin D 5000 daily  - Hypokalemia continue with potassium 40 mg twice a day follow-up BMP for further recommendation  -Follow-up 3 months      Hypertension  Pertinent negatives include no chest pain, headaches or palpitations.   Extremity Weakness  Associated symptoms include arthralgias, fatigue and joint swelling. Pertinent negatives include no abdominal pain, chest pain, congestion, coughing, fever, headaches, rash or sore throat.          Review of Systems   Constitutional:  Positive for fatigue. Negative for fever and unexpected weight change.   HENT:  Negative for congestion, ear discharge, ear pain, mouth sores, sinus pain and sore throat.    Eyes:  Negative for visual disturbance.   Respiratory:  Negative for cough and wheezing.    Cardiovascular:  Negative for chest pain, palpitations and leg swelling.   Gastrointestinal:  Negative for abdominal pain, blood in stool and diarrhea.        Difficulty in swallowing due to thyroid enlargement   Genitourinary:  Negative for difficulty urinating.   Musculoskeletal:  Positive for arthralgias, extremity weakness, gait problem and joint swelling.   Skin:  Negative for rash.   Neurological:  Negative for dizziness and headaches.   Hematological:  Does not bruise/bleed easily.   Psychiatric/Behavioral:  Negative for behavioral problems.    All other systems reviewed and are negative.      Objective   Lab Results   Component Value Date    HGBA1C 5.5 12/28/2023      Pulse 66   LMP  (LMP Unknown)   SpO2 99%   Lab Results   Component Value Date    WBC 6.9 06/12/2024    HGB 10.2 (L) 06/12/2024    HCT 32.7 (L) 06/12/2024     06/12/2024    CHOL 133 06/12/2024    TRIG 129 06/12/2024    HDL 25.8 06/12/2024    ALT 10 06/12/2024    AST 17 06/12/2024     08/06/2024    K 3.9 08/06/2024     CL 96 (L) 08/06/2024    CREATININE 1.30 (H) 08/06/2024    BUN 24 (H) 08/06/2024    CO2 40 (HH) 08/06/2024    TSH 0.01 (L) 10/10/2024    INR 3.40 (A) 11/21/2024    HGBA1C 5.5 12/28/2023     par   Physical Exam  Constitutional:       General: She is not in acute distress.     Appearance: She is obese. She is not ill-appearing, toxic-appearing or diaphoretic.   Neurological:      Mental Status: She is alert.   Psychiatric:         Mood and Affect: Mood normal.         Assessment/Plan   Emma was seen today for hypothyroidism, hypertension, extremity weakness and lift chair rx.  Diagnoses and all orders for this visit:  Thyromegaly (Primary)  -     Referral to Endocrinology; Future  CHF (NYHA class III, ACC/AHA stage C) (Multi)  -     furosemide (Lasix) 40 mg tablet; Take 1 tablet (40 mg) by mouth 2 times a day.  Muscular deconditioning  -     Referral to Home Care; Future  Recurrent falls  -     Referral to Home Care; Future  Hypothyroidism, postsurgical  Hypercholesteremia  Hypertension, unspecified type  Stage 3a chronic kidney disease (Multi)  Other orders  -     Follow Up In Primary Care - Established  -     Follow Up In Primary Care - Established; Future   - Recent blood work reviewed  - Slipped off her couch could not stand up due to severe muscle weakness had to call squad to come to help her  Patient comes about home physical therapy ordered today to strengthen her muscles and to improve her ambulation  -Thyromegaly having difficulty and pressure symptoms when she swallows  Patient not surgical candidate previously continue Xarelto due to difficult hemithyroidectomy  Treated with radioactive iodine twice  Recent thyroid function test TSH 0.01 takes now 400 levothyroxine daily  Need to cut down the levothyroxine to take 300 alternating with 400 every other day  Refer patient to endocrinology for further assessment and recommendation refer patient to  endocrinology    - Renal insufficiency stable continue  with current medication Lasix 40 mg twice a day chlorthalidone twice a week  --Chronic respiratory failure continues 2 L oxygen  - Obstructive sleep apnea continue CPAP  -- Hypertension controlled continue with carvedilol half tablet twice a day patient controlled no need to go to isosorbide at this time continue monitoring closely  - afib (s/p ablation, on warfarin), COPD (on 2L continuous oxygen), morbid obesity, CVI, HTN, hypothyroidism and AAA (s/p EVAR 2016) now s/p right renal artery stenting (6x59m [place] VBX), aortic stent graft placement ([id number] [place] aortic cuff x2), bilateral iliac stent graft placement, and right hypogastric artery coil embolization on 1/17/2024 by Dr. Walton and Dr. Hernandez. Overnight on POD0/POD1 she developed afib RVR with hypotension requiring multiple IV metoprolol  Is still having endoleak awaiting follow-up 6 months ultrasound.  -Atrial fibrillation controlled to continue with carvedilol blood pressure controlled heart rate controlled  - Atrial fibrillation patient seen by Coumadin clinic review 1 further follow-up INR closely.  - Hypomagnesemia continue with magnesium tablet daily  - Vitamin D deficiency need to start on vitamin D 5000 daily  - Hypokalemia continue with potassium 40 mg twice a day follow-up BMP for further recommendation  -Follow-up 3 months

## 2024-12-13 ENCOUNTER — DOCUMENTATION (OUTPATIENT)
Dept: HOME HEALTH SERVICES | Facility: HOME HEALTH | Age: 69
End: 2024-12-13
Payer: MEDICARE

## 2024-12-13 ENCOUNTER — ANTICOAGULATION - WARFARIN VISIT (OUTPATIENT)
Dept: PHARMACY | Facility: HOSPITAL | Age: 69
End: 2024-12-13
Payer: MEDICARE

## 2024-12-13 ENCOUNTER — HOME HEALTH ADMISSION (OUTPATIENT)
Dept: HOME HEALTH SERVICES | Facility: HOME HEALTH | Age: 69
End: 2024-12-13
Payer: MEDICARE

## 2024-12-13 DIAGNOSIS — I48.91 ATRIAL FIBRILLATION, UNSPECIFIED TYPE (MULTI): Primary | ICD-10-CM

## 2024-12-13 LAB
INR IN PPP BY COAGULATION ASSAY EXTERNAL: 5.6 (ref 2–3)
PROTHROMBIN TIME (PT) IN PPP BY COAGULATION ASSAY EXTERNAL: ABNORMAL

## 2024-12-13 NOTE — PROGRESS NOTES
Subjective     Emma Villela is a 69 y.o. female who presents for anticoagulation follow up.     Location: [facility]     Referring Provider: Ti Nolan MD  INR Goal: 2.0-3.0  Indication: Atrial Fibrillation/Atrial Flutter    Bleeding signs/symptoms: No  Bruising: No   Major bleeding event: No  Thrombosis signs/symptoms: No  Thromboembolic event: No  Missed doses: No  Extra doses: No  Medication changes: No  Dietary changes: No  Change in health: No  Change in activity: No  Alcohol: No  Other concerns: No  Upcoming Surgeries: no  Parenteral anticoagulation: no    Current Outpatient Medications on File Prior to Visit   Medication Sig Dispense Refill    acetaminophen (Tylenol) 325 mg tablet Take 1 tablet (325 mg) by mouth every 4 hours if needed.      albuterol 90 mcg/actuation inhaler Inhale 2 puffs every 6 hours if needed.      amitriptyline (Elavil) 25 mg tablet TAKE ONE TABLET BY MOUTH AT BEDTIME 90 tablet 0    aspirin 81 mg EC tablet Take 1 tablet (81 mg) by mouth once daily.      atorvastatin (Lipitor) 40 mg tablet TAKE ONE TABLET BY MOUTH DAILY 90 tablet 1    budesonide-formoteroL (Symbicort) 160-4.5 mcg/actuation inhaler Inhale 2 puffs 2 times a day. 10.2 g 11    carvedilol (Coreg) 25 mg tablet TAKE 1/2 TABLETS BY MOUTH TWICE DAILY WITH MEALS 180 tablet 0    cholecalciferol (Vitamin D-3) 125 MCG (5000 UT) capsule TAKE ONE CAPSULE BY MOUTH DAILY 90 capsule 1    ferrous gluconate 324 (38 Fe) MG tablet TAKE ONE TABLET BY MOUTH DAILY 90 tablet 1    furosemide (Lasix) 40 mg tablet Take 1 tablet (40 mg) by mouth 2 times a day. 180 tablet 1    hydrALAZINE (Apresoline) 100 mg tablet TAKE ONE TABLET BY MOUTH THREE TIMES A  tablet 1    levothyroxine (Synthroid, Levoxyl) 100 mcg tablet Take 1 tablet (100 mcg) by mouth early in the morning.. Take 100 mcg in addition to 300 mcg daily total 400 mcg daily on an empty stomach at the same time each day, either 30 to 60 minutes  prior to breakfast 90 tablet 3    levothyroxine (Synthroid, Unithroid) 300 mcg tablet Take 1 tablet (300 mcg) by mouth early in the morning.. Take 300 mcg tablet in addition to 100 mcg tablet, total 400 mcg daily on an empty stomach at the same time each day, either 30 to 60 minutes prior to breakfast 90 tablet 3    losartan (Cozaar) 100 mg tablet TAKE ONE TABLET BY MOUTH DAILY 90 tablet 1    magnesium oxide (Mag-Ox) 400 mg (241.3 mg magnesium) tablet TAKE ONE TABLET BY MOUTH TWICE A DAY AFTER MEALS 180 tablet 0    meclizine (Antivert) 25 mg tablet TAKE ONE TABLET BY MOUTH AT BEDTIME 30 tablet 2    metOLazone (Zaroxolyn) 5 mg tablet Take 1 tablet (5 mg) by mouth. Twice a week      oxybutynin XL (Ditropan-XL) 15 mg 24 hr tablet Take 1 tablet (15 mg) by mouth once daily.      oxybutynin XL (Ditropan-XL) 5 mg 24 hr tablet TAKE ONE TABLET BY MOUTH EVERY EVENING IN ADDITION TO THE 15 MG XL TABS.      pantoprazole (ProtoNix) 40 mg EC tablet TAKE ONE TABLET BY MOUTH DAILY 90 tablet 1    potassium chloride CR 20 mEq ER tablet TAKE ONE TABLET BY MOUTH EVERY 12 HOURS 180 tablet 1    warfarin (Jantoven) 7.5 mg tablet TAKE ONE TABLET BY MOUTH EVERY DAY 90 tablet 1    [DISCONTINUED] furosemide (Lasix) 40 mg tablet Take 1 tablet (40 mg) by mouth 2 times a day. 180 tablet 1     No current facility-administered medications on file prior to visit.        Objective   Anticoagulation Summary  As of 2024      INR goal:  2.0-3.0   TTR:  72.4% (1.1 y)   INR used for dosin.60 (2024)   Weekly warfarin total:  45 mg             Lab Results   Component Value Date    INR 5.60 (A) 2024    INR 3.40 (A) 2024    INR 3.80 (A) 2024    INR 2.40 (N) 2024    INR 2.10 (N) 10/31/2024    PROTIME 21.3 (H) 2024    PROTIME 18.2 (H) 2024    PROTIME 16.2 (H) 2024       Assessment/Plan   Current INR is Supratherapeutic at 5.6. Previous INR was Supratherapeutic at 3.4. Easier bruising and no bleeding.  Not eating much vitamin K recently. More stress recently. Levothyroxine dose increased this week. Advised patient to hold warfarin x2 doses then take 3.75 mg on Sunday. Will recheck INR Monday and provide further dosing instructions are that time.     Follow Up:  3 days - check Monday.    Katja Chisholm, NolaD

## 2024-12-13 NOTE — HH CARE COORDINATION
Home Care received a Referral for Physical Therapy. We have processed the referral for a Start of Care on 24-72 HRS.     If you have any questions or concerns, please feel free to contact us at [phone]. Follow the prompts, enter your five digit zip code, and you will be directed to your care team on [facility] 1.

## 2024-12-16 ENCOUNTER — HOME CARE VISIT (OUTPATIENT)
Dept: HOME HEALTH SERVICES | Facility: HOME HEALTH | Age: 69
End: 2024-12-16
Payer: MEDICARE

## 2024-12-16 ENCOUNTER — HOME CARE VISIT (OUTPATIENT)
Dept: HOME HEALTH SERVICES | Facility: HOME HEALTH | Age: 69
End: 2024-12-16

## 2024-12-16 PROCEDURE — 169592 NO-PAY CLAIM PROCEDURE

## 2024-12-16 PROCEDURE — G0151 HHCP-SERV OF PT,EA 15 MIN: HCPCS | Mod: HHH

## 2024-12-16 ASSESSMENT — ENCOUNTER SYMPTOMS
PAIN LOCATION - PAIN SEVERITY: 3/10
PAIN LOCATION: RIGHT LEG
PAIN SEVERITY GOAL: 0/10
PAIN LOCATION - PAIN SEVERITY: 3/10
PAIN: 1
PAIN LOCATION: LEFT LEG
LOWEST PAIN SEVERITY IN PAST 24 HOURS: 3/10
SUBJECTIVE PAIN PROGRESSION: GRADUALLY IMPROVING
PERSON REPORTING PAIN: PATIENT
HIGHEST PAIN SEVERITY IN PAST 24 HOURS: 5/10

## 2024-12-16 ASSESSMENT — ACTIVITIES OF DAILY LIVING (ADL)
ENTERING_EXITING_HOME: MINIMUM ASSIST
OASIS_M1830: 03

## 2024-12-16 NOTE — HOME HEALTH
PRIOR HISTORY:- Slipped off her couch could not stand up due to severe muscle weakness had to call squad to come to help her   Patient comes about home physical therapy ordered today to strengthen her muscles and to improve her ambulation   -Thyromegaly having difficulty and pressure symptoms when she swallows   Patient not surgical candidate previously continue Xarelto due to difficult hemithyroidectomy   Treated with radioactive iodine twice   Recent thyroid function test TSH 0.01 takes now 400 levothyroxine daily   Need to cut down the levothyroxine to take 300 alternating with 400 every other day   Refer patient to endocrinology for further assessment and recommendation refer patient to  endocrinology   - Renal insufficiency stable continue with current medication Lasix 40 mg twice a day chlorthalidone twice a week   --Chronic respiratory failure continues 2 L oxygen   - Obstructive sleep apnea continue CPAP   -- Hypertension controlled continue with carvedilol half tablet twice a day patient controlled no need to go to isosorbide at this time continue monitoring closely   - afib (s/p ablation, on warfarin), COPD (on 2L continuous oxygen), morbid obesity, CVI, HTN, hypothyroidism and AAA (s/p EVAR 2016) now s/p right renal artery stenting (6x59m [place] VBX), aortic stent graft placement ([id number] [place] aortic cuff x2), bilateral iliac stent graft placement, and right hypogastric artery coil embolization on 1/17/2024 by Dr. Walton and Dr. Hernandez. Overnight on POD0/POD1 she developed afib RVR with hypotension requiring multiple IV metoprolol   Is still having endoleak awaiting follow-up 6 months ultrasound.   -Atrial fibrillation controlled to continue with carvedilol blood pressure controlled heart rate controlled   - Atrial fibrillation patient seen by Coumadin clinic review 1 further follow-up INR closely.   - Hypomagnesemia continue with magnesium tablet daily   - Vitamin D deficiency need to start on vitamin D  5000 daily   - Hypokalemia continue with potassium 40 mg twice a day follow-up BMP for further recommendation   -Follow-up 3 months    No pain.   Fall 12 9 24.  Did not go to the hospital.   Just soreness to both thighs.    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  Ankle pumps  2.  Long arc quads  3.  Hip flexion  4.  Standing hip flexion or marching  5.  Standing hip extension  6.  Standing hip abduction  7.  Standing knee flexion.        and patient performed these while sitting on her couch but also standing at her kitchen sink.  She was provided with a written copy.

## 2024-12-18 ENCOUNTER — HOME CARE VISIT (OUTPATIENT)
Dept: HOME HEALTH SERVICES | Facility: HOME HEALTH | Age: 69
End: 2024-12-18
Payer: MEDICARE

## 2024-12-19 ENCOUNTER — ANTICOAGULATION - WARFARIN VISIT (OUTPATIENT)
Dept: PHARMACY | Facility: HOSPITAL | Age: 69
End: 2024-12-19
Payer: MEDICARE

## 2024-12-19 DIAGNOSIS — I48.91 ATRIAL FIBRILLATION, UNSPECIFIED TYPE (MULTI): Primary | ICD-10-CM

## 2024-12-19 NOTE — PROGRESS NOTES
Subjective     Emma Vilella is a 69 y.o. female who presents for anticoagulation follow up.     Location: [facility]     Referring Provider: No ref. provider found   INR Goal: 2.0-3.0  Indication: Atrial Fibrillation/Atrial Flutter    Bleeding signs/symptoms: No  Bruising: No   Major bleeding event: No  Thrombosis signs/symptoms: No  Thromboembolic event: No  Missed doses: No  Extra doses: No  Medication changes: No  Dietary changes: No  Change in health: No  Change in activity: No  Alcohol: No  Other concerns: No  Upcoming Surgeries: no  Parenteral anticoagulation: no    Current Outpatient Medications on File Prior to Visit   Medication Sig Dispense Refill    acetaminophen (Tylenol) 325 mg tablet Take 1 tablet (325 mg) by mouth every 4 hours if needed.      albuterol 90 mcg/actuation inhaler Inhale 2 puffs every 6 hours if needed.      amitriptyline (Elavil) 25 mg tablet TAKE ONE TABLET BY MOUTH AT BEDTIME 90 tablet 0    aspirin 81 mg EC tablet Take 1 tablet (81 mg) by mouth once daily.      atorvastatin (Lipitor) 40 mg tablet TAKE ONE TABLET BY MOUTH DAILY 90 tablet 1    budesonide-formoteroL (Symbicort) 160-4.5 mcg/actuation inhaler Inhale 2 puffs 2 times a day. 10.2 g 11    carvedilol (Coreg) 25 mg tablet TAKE 1/2 TABLETS BY MOUTH TWICE DAILY WITH MEALS 180 tablet 0    cholecalciferol (Vitamin D-3) 125 MCG (5000 UT) capsule TAKE ONE CAPSULE BY MOUTH DAILY 90 capsule 1    ferrous gluconate 324 (38 Fe) MG tablet TAKE ONE TABLET BY MOUTH DAILY 90 tablet 1    furosemide (Lasix) 40 mg tablet Take 1 tablet (40 mg) by mouth 2 times a day. 180 tablet 1    hydrALAZINE (Apresoline) 100 mg tablet TAKE ONE TABLET BY MOUTH THREE TIMES A  tablet 1    levothyroxine (Synthroid, Levoxyl) 100 mcg tablet Take 1 tablet (100 mcg) by mouth early in the morning.. Take 100 mcg in addition to 300 mcg daily total 400 mcg daily on an empty stomach at the same time each day, either 30 to 60  minutes prior to breakfast 90 tablet 3    levothyroxine (Synthroid, Unithroid) 300 mcg tablet Take 1 tablet (300 mcg) by mouth early in the morning.. Take 300 mcg tablet in addition to 100 mcg tablet, total 400 mcg daily on an empty stomach at the same time each day, either 30 to 60 minutes prior to breakfast 90 tablet 3    losartan (Cozaar) 100 mg tablet TAKE ONE TABLET BY MOUTH DAILY 90 tablet 1    magnesium oxide (Mag-Ox) 400 mg (241.3 mg magnesium) tablet TAKE ONE TABLET BY MOUTH TWICE A DAY AFTER MEALS 180 tablet 0    meclizine (Antivert) 25 mg tablet TAKE ONE TABLET BY MOUTH AT BEDTIME (Patient taking differently: Take 1 tablet by mouth once daily as needed for dizziness. Indications: sensation of spinning or whirling) 30 tablet 2    metOLazone (Zaroxolyn) 5 mg tablet Take 1 tablet (5 mg) by mouth. Twice a week      oxybutynin XL (Ditropan-XL) 15 mg 24 hr tablet Take 1 tablet (15 mg) by mouth once daily.      oxybutynin XL (Ditropan-XL) 5 mg 24 hr tablet TAKE ONE TABLET BY MOUTH EVERY EVENING IN ADDITION TO THE 15 MG XL TABS.      pantoprazole (ProtoNix) 40 mg EC tablet TAKE ONE TABLET BY MOUTH DAILY 90 tablet 1    potassium chloride CR 20 mEq ER tablet TAKE ONE TABLET BY MOUTH EVERY 12 HOURS 180 tablet 1    warfarin (Jantoven) 7.5 mg tablet TAKE ONE TABLET BY MOUTH EVERY DAY 90 tablet 1     No current facility-administered medications on file prior to visit.        Objective   Anticoagulation Summary  As of 2024      INR goal:  2.0-3.0   TTR:  71.5% (1.1 y)   INR used for dosin.50 (2024)   Weekly warfarin total:  41.25 mg             Lab Results   Component Value Date    INR 2.50 (N) 2024    INR 5.60 (A) 2024    INR 3.40 (A) 2024    INR 3.80 (A) 2024    INR 2.40 (N) 2024    PROTIME 21.3 (H) 2024    PROTIME 18.2 (H) 2024    PROTIME 16.2 (H) 2024       Assessment/Plan   Current INR is Therapeutic at 2.5. Previous INR was Supratherapeutic at 5.6.  No clear cause for prior supratherapeutic level although may have been related to levothyroxine dose adjustment. Advised patient to lower current warfarin regimen to 3.75 mg on Tue/Thur/Sat and 7.5 mg all other days.     Follow Up: 1 week    Katja Chisholm, PharmD

## 2024-12-27 ENCOUNTER — HOME CARE VISIT (OUTPATIENT)
Dept: HOME HEALTH SERVICES | Facility: HOME HEALTH | Age: 69
End: 2024-12-27
Payer: MEDICARE

## 2024-12-27 VITALS — OXYGEN SATURATION: 95 % | TEMPERATURE: 97.4 F | HEART RATE: 104 BPM

## 2024-12-27 PROCEDURE — G0157 HHC PT ASSISTANT EA 15: HCPCS | Mod: CQ,HHH

## 2024-12-27 ASSESSMENT — ENCOUNTER SYMPTOMS
PAIN LOCATION - PAIN QUALITY: SHARP THROBBING
PAIN LOCATION - PAIN QUALITY: SHARP THROBBING
PERSON REPORTING PAIN: PATIENT
PAIN: 1
HIGHEST PAIN SEVERITY IN PAST 24 HOURS: 2/10
PAIN LOCATION - PAIN SEVERITY: 2/10
PAIN LOCATION: RIGHT KNEE
PAIN LOCATION: LEFT KNEE
PAIN LOCATION - PAIN SEVERITY: 2/10

## 2024-12-31 ENCOUNTER — ANTICOAGULATION - WARFARIN VISIT (OUTPATIENT)
Dept: PHARMACY | Facility: HOSPITAL | Age: 69
End: 2024-12-31
Payer: MEDICARE

## 2024-12-31 DIAGNOSIS — I48.91 ATRIAL FIBRILLATION, UNSPECIFIED TYPE (MULTI): Primary | ICD-10-CM

## 2024-12-31 NOTE — PROGRESS NOTES
Subjective     Emma Villela is a 69 y.o. female who presents for anticoagulation follow up.     Location: [facility]     Referring Provider: Ti Nolan MD  INR Goal: 2.0-3.0  Indication: Atrial Fibrillation/Atrial Flutter    Bleeding signs/symptoms: No  Bruising: No   Major bleeding event: No  Thrombosis signs/symptoms: No  Thromboembolic event: No  Missed doses: No  Extra doses: No  Medication changes: No  Dietary changes: No  Change in health: No  Change in activity: No  Alcohol: No  Other concerns: No  Upcoming Surgeries: no  Parenteral anticoagulation: no    Current Outpatient Medications on File Prior to Visit   Medication Sig Dispense Refill    acetaminophen (Tylenol) 325 mg tablet Take 1 tablet (325 mg) by mouth every 4 hours if needed.      albuterol 90 mcg/actuation inhaler Inhale 2 puffs every 6 hours if needed.      amitriptyline (Elavil) 25 mg tablet TAKE ONE TABLET BY MOUTH AT BEDTIME 90 tablet 0    aspirin 81 mg EC tablet Take 1 tablet (81 mg) by mouth once daily.      atorvastatin (Lipitor) 40 mg tablet TAKE ONE TABLET BY MOUTH DAILY 90 tablet 1    budesonide-formoteroL (Symbicort) 160-4.5 mcg/actuation inhaler Inhale 2 puffs 2 times a day. 10.2 g 11    carvedilol (Coreg) 25 mg tablet TAKE 1/2 TABLETS BY MOUTH TWICE DAILY WITH MEALS 180 tablet 0    cholecalciferol (Vitamin D-3) 125 MCG (5000 UT) capsule TAKE ONE CAPSULE BY MOUTH DAILY 90 capsule 1    ferrous gluconate 324 (38 Fe) MG tablet TAKE ONE TABLET BY MOUTH DAILY 90 tablet 1    furosemide (Lasix) 40 mg tablet Take 1 tablet (40 mg) by mouth 2 times a day. 180 tablet 1    hydrALAZINE (Apresoline) 100 mg tablet TAKE ONE TABLET BY MOUTH THREE TIMES A  tablet 1    levothyroxine (Synthroid, Levoxyl) 100 mcg tablet Take 1 tablet (100 mcg) by mouth early in the morning.. Take 100 mcg in addition to 300 mcg daily total 400 mcg daily on an empty stomach at the same time each day, either 30 to 60 minutes  prior to breakfast 90 tablet 3    levothyroxine (Synthroid, Unithroid) 300 mcg tablet Take 1 tablet (300 mcg) by mouth early in the morning.. Take 300 mcg tablet in addition to 100 mcg tablet, total 400 mcg daily on an empty stomach at the same time each day, either 30 to 60 minutes prior to breakfast 90 tablet 3    losartan (Cozaar) 100 mg tablet TAKE ONE TABLET BY MOUTH DAILY 90 tablet 1    magnesium oxide (Mag-Ox) 400 mg (241.3 mg magnesium) tablet TAKE ONE TABLET BY MOUTH TWICE A DAY AFTER MEALS 180 tablet 0    meclizine (Antivert) 25 mg tablet TAKE ONE TABLET BY MOUTH AT BEDTIME (Patient taking differently: Take 1 tablet by mouth once daily as needed for dizziness. Indications: sensation of spinning or whirling) 30 tablet 2    metOLazone (Zaroxolyn) 5 mg tablet Take 1 tablet (5 mg) by mouth. Twice a week      oxybutynin XL (Ditropan-XL) 15 mg 24 hr tablet Take 1 tablet (15 mg) by mouth once daily.      oxybutynin XL (Ditropan-XL) 5 mg 24 hr tablet TAKE ONE TABLET BY MOUTH EVERY EVENING IN ADDITION TO THE 15 MG XL TABS.      oxygen (O2) gas therapy Inhale 1 L/min continuously. Indications: DYSPNEA, ON EXERTION      pantoprazole (ProtoNix) 40 mg EC tablet TAKE ONE TABLET BY MOUTH DAILY 90 tablet 1    potassium chloride CR 20 mEq ER tablet TAKE ONE TABLET BY MOUTH EVERY 12 HOURS 180 tablet 1    warfarin (Jantoven) 7.5 mg tablet TAKE ONE TABLET BY MOUTH EVERY DAY 90 tablet 1     No current facility-administered medications on file prior to visit.        Objective   Anticoagulation Summary  As of 12/31/2024      INR goal:  2.0-3.0   TTR:  71.9% (1.1 y)   INR used for dosing:  3.10 (12/31/2024)   Weekly warfarin total:  37.5 mg             Lab Results   Component Value Date    INR 3.10 (A) 12/31/2024    INR 2.50 (N) 12/19/2024    INR 5.60 (A) 12/13/2024    INR 3.40 (A) 11/21/2024    INR 2.40 (N) 11/07/2024    PROTIME 21.3 (H) 01/21/2024    PROTIME 18.2 (H) 01/20/2024    PROTIME 16.2 (H) 01/19/2024          Assessment/Plan   Current INR is Supratherapeutic at 3.1. Previous INR was also Supratherapeutic at 3.2. No clear cause for prior supratherapeutic level although may be related to levothyroxine dose adjustment. Advised patient to lower current warfarin regimen to 3.75 mg on Tue/Thur/Sat/Sun and 7.5 mg M/W/F.     Follow Up: 1 week    Katja Chisholm, PharmD

## 2025-01-03 ENCOUNTER — HOME CARE VISIT (OUTPATIENT)
Dept: HOME HEALTH SERVICES | Facility: HOME HEALTH | Age: 70
End: 2025-01-03
Payer: MEDICARE

## 2025-01-03 VITALS
OXYGEN SATURATION: 94 % | DIASTOLIC BLOOD PRESSURE: 80 MMHG | SYSTOLIC BLOOD PRESSURE: 124 MMHG | TEMPERATURE: 98.4 F | HEART RATE: 104 BPM

## 2025-01-03 DIAGNOSIS — K21.9 GASTROESOPHAGEAL REFLUX DISEASE, UNSPECIFIED WHETHER ESOPHAGITIS PRESENT: ICD-10-CM

## 2025-01-03 PROCEDURE — G0157 HHC PT ASSISTANT EA 15: HCPCS | Mod: CQ,HHH

## 2025-01-03 ASSESSMENT — ENCOUNTER SYMPTOMS
PAIN LOCATION - PAIN FREQUENCY: FREQUENT
PAIN LOCATION - PAIN QUALITY: THROBBING
PAIN LOCATION - PAIN FREQUENCY: FREQUENT
PERSON REPORTING PAIN: PATIENT
PAIN: 1
PAIN LOCATION - PAIN QUALITY: THROBBING
PAIN LOCATION - PAIN SEVERITY: 2/10
PAIN LOCATION: RIGHT KNEE
PAIN LOCATION - EXACERBATING FACTORS: ACTIVITY
LOWEST PAIN SEVERITY IN PAST 24 HOURS: 0/10
PAIN LOCATION - PAIN SEVERITY: 2/10
PAIN LOCATION - EXACERBATING FACTORS: ACTIVITY
HIGHEST PAIN SEVERITY IN PAST 24 HOURS: 2/10
PAIN LOCATION: LEFT KNEE

## 2025-01-06 RX ORDER — PANTOPRAZOLE SODIUM 40 MG/1
40 TABLET, DELAYED RELEASE ORAL DAILY
Qty: 90 TABLET | Refills: 0 | Status: SHIPPED | OUTPATIENT
Start: 2025-01-06

## 2025-01-07 ENCOUNTER — ANTICOAGULATION - WARFARIN VISIT (OUTPATIENT)
Dept: PHARMACY | Facility: HOSPITAL | Age: 70
End: 2025-01-07
Payer: MEDICARE

## 2025-01-07 DIAGNOSIS — I48.91 ATRIAL FIBRILLATION, UNSPECIFIED TYPE (MULTI): Primary | ICD-10-CM

## 2025-01-07 NOTE — PROGRESS NOTES
Subjective     Emma Villela is a 69 y.o. female who presents for anticoagulation follow up.     Location: [facility]     Referring Provider: Ti Nolan MD  INR Goal: 2.0-3.0  Indication: Atrial Fibrillation/Atrial Flutter    Current Outpatient Medications on File Prior to Visit   Medication Sig Dispense Refill    acetaminophen (Tylenol) 325 mg tablet Take 1 tablet (325 mg) by mouth every 4 hours if needed.      albuterol 90 mcg/actuation inhaler Inhale 2 puffs every 6 hours if needed.      amitriptyline (Elavil) 25 mg tablet TAKE ONE TABLET BY MOUTH AT BEDTIME 90 tablet 0    aspirin 81 mg EC tablet Take 1 tablet (81 mg) by mouth once daily.      atorvastatin (Lipitor) 40 mg tablet TAKE ONE TABLET BY MOUTH DAILY 90 tablet 1    budesonide-formoteroL (Symbicort) 160-4.5 mcg/actuation inhaler Inhale 2 puffs 2 times a day. 10.2 g 11    carvedilol (Coreg) 25 mg tablet TAKE 1/2 TABLETS BY MOUTH TWICE DAILY WITH MEALS 180 tablet 0    cholecalciferol (Vitamin D-3) 125 MCG (5000 UT) capsule TAKE ONE CAPSULE BY MOUTH DAILY 90 capsule 1    ferrous gluconate 324 (38 Fe) MG tablet TAKE ONE TABLET BY MOUTH DAILY 90 tablet 1    furosemide (Lasix) 40 mg tablet Take 1 tablet (40 mg) by mouth 2 times a day. 180 tablet 1    hydrALAZINE (Apresoline) 100 mg tablet TAKE ONE TABLET BY MOUTH THREE TIMES A  tablet 1    levothyroxine (Synthroid, Levoxyl) 100 mcg tablet Take 1 tablet (100 mcg) by mouth early in the morning.. Take 100 mcg in addition to 300 mcg daily total 400 mcg daily on an empty stomach at the same time each day, either 30 to 60 minutes prior to breakfast 90 tablet 3    levothyroxine (Synthroid, Unithroid) 300 mcg tablet Take 1 tablet (300 mcg) by mouth early in the morning.. Take 300 mcg tablet in addition to 100 mcg tablet, total 400 mcg daily on an empty stomach at the same time each day, either 30 to 60 minutes prior to breakfast 90 tablet 3    losartan (Cozaar) 100 mg  tablet TAKE ONE TABLET BY MOUTH DAILY 90 tablet 1    magnesium oxide (Mag-Ox) 400 mg (241.3 mg magnesium) tablet TAKE ONE TABLET BY MOUTH TWICE A DAY AFTER MEALS 180 tablet 0    meclizine (Antivert) 25 mg tablet TAKE ONE TABLET BY MOUTH AT BEDTIME (Patient taking differently: Take 1 tablet by mouth once daily as needed for dizziness. Indications: sensation of spinning or whirling) 30 tablet 2    metOLazone (Zaroxolyn) 5 mg tablet Take 1 tablet (5 mg) by mouth. Twice a week      oxybutynin XL (Ditropan-XL) 15 mg 24 hr tablet Take 1 tablet (15 mg) by mouth once daily.      oxybutynin XL (Ditropan-XL) 5 mg 24 hr tablet TAKE ONE TABLET BY MOUTH EVERY EVENING IN ADDITION TO THE 15 MG XL TABS.      oxygen (O2) gas therapy Inhale 1 L/min continuously. Indications: DYSPNEA, ON EXERTION      pantoprazole (ProtoNix) 40 mg EC tablet TAKE ONE TABLET BY MOUTH DAILY 90 tablet 0    potassium chloride CR 20 mEq ER tablet TAKE ONE TABLET BY MOUTH EVERY 12 HOURS 180 tablet 1    warfarin (Jantoven) 7.5 mg tablet TAKE ONE TABLET BY MOUTH EVERY DAY 90 tablet 1    [DISCONTINUED] pantoprazole (ProtoNix) 40 mg EC tablet TAKE ONE TABLET BY MOUTH DAILY 90 tablet 1     No current facility-administered medications on file prior to visit.        Objective   Anticoagulation Summary  As of 2025      INR goal:  2.0-3.0   TTR:  72.1% (1.1 y)   INR used for dosin.40 (2025)   Weekly warfarin total:  37.5 mg             Lab Results   Component Value Date    INR 2.40 (N) 2025    INR 3.10 (A) 2024    INR 2.50 (N) 2024    INR 3.40 (A) 2024    INR 2.40 (N) 2024    PROTIME 21.3 (H) 2024    PROTIME 18.2 (H) 2024    PROTIME 16.2 (H) 2024         Assessment/Plan   Current INR is Therapeutic at 2.4. Previous INR was Supratherapeutic at 3.1. No clear cause for prior supratherapeutic level although may be related to levothyroxine dose adjustment. Warfarin dose was lowered at previous visit and INR is  now within normal range. Plan to continue current warfarin regimen to 3.75 mg on Tue/Thur/Sat/Sun and 7.5 mg M/W/F.     Follow Up: 1 week    Katja Chisholm, PharmD

## 2025-01-08 DIAGNOSIS — I48.11 LONGSTANDING PERSISTENT ATRIAL FIBRILLATION (MULTI): ICD-10-CM

## 2025-01-08 RX ORDER — WARFARIN SODIUM 7.5 MG/1
7.5 TABLET ORAL
Qty: 90 TABLET | Refills: 0 | Status: SHIPPED | OUTPATIENT
Start: 2025-01-08

## 2025-01-10 ENCOUNTER — HOME CARE VISIT (OUTPATIENT)
Dept: HOME HEALTH SERVICES | Facility: HOME HEALTH | Age: 70
End: 2025-01-10
Payer: MEDICARE

## 2025-01-10 VITALS
HEART RATE: 91 BPM | TEMPERATURE: 98 F | DIASTOLIC BLOOD PRESSURE: 72 MMHG | SYSTOLIC BLOOD PRESSURE: 118 MMHG | OXYGEN SATURATION: 91 %

## 2025-01-10 PROCEDURE — G0157 HHC PT ASSISTANT EA 15: HCPCS | Mod: CQ,HHH

## 2025-01-10 ASSESSMENT — ENCOUNTER SYMPTOMS
PERSON REPORTING PAIN: PATIENT
PAIN LOCATION - PAIN SEVERITY: 2/10
PAIN LOCATION - PAIN FREQUENCY: CONSTANT
PAIN LOCATION - EXACERBATING FACTORS: WEIGHT BEARING, ACTIVITY
SUBJECTIVE PAIN PROGRESSION: WAXING AND WANING
PAIN LOCATION: RIGHT KNEE
PAIN LOCATION - PAIN DURATION: CHRONIC
PAIN LOCATION: LEFT KNEE
PAIN LOCATION - EXACERBATING FACTORS: WEIGHT BEARING, ACTIVITY
PAIN LOCATION - PAIN SEVERITY: 2/10
HIGHEST PAIN SEVERITY IN PAST 24 HOURS: 4/10
LOWEST PAIN SEVERITY IN PAST 24 HOURS: 1/10
PAIN LOCATION - PAIN DURATION: CHRONIC
PAIN: 1
PAIN LOCATION - PAIN FREQUENCY: CONSTANT

## 2025-01-14 ENCOUNTER — HOME CARE VISIT (OUTPATIENT)
Dept: HOME HEALTH SERVICES | Facility: HOME HEALTH | Age: 70
End: 2025-01-14
Payer: MEDICARE

## 2025-01-14 VITALS — OXYGEN SATURATION: 97 % | HEART RATE: 100 BPM

## 2025-01-14 PROCEDURE — G0151 HHCP-SERV OF PT,EA 15 MIN: HCPCS | Mod: HHH

## 2025-01-14 ASSESSMENT — ENCOUNTER SYMPTOMS
SUBJECTIVE PAIN PROGRESSION: GRADUALLY IMPROVING
PAIN LOCATION - PAIN SEVERITY: 1/10
PAIN LOCATION - PAIN SEVERITY: 1/10
PAIN: 1
LOWEST PAIN SEVERITY IN PAST 24 HOURS: 1/10
HIGHEST PAIN SEVERITY IN PAST 24 HOURS: 2/10
PAIN LOCATION: LEFT KNEE
PERSON REPORTING PAIN: PATIENT
PAIN LOCATION: RIGHT KNEE

## 2025-01-14 ASSESSMENT — ACTIVITIES OF DAILY LIVING (ADL)
AMBULATION ASSISTANCE: MINIMUM ASSIST
AMBULATION ASSISTANCE: 1

## 2025-01-14 NOTE — HOME HEALTH
No significant news, no falls, no trips.    TUG 20 seconds    Resting pulse ox reading 97% 100 bpm.    Pulse ox reading 65% 141 bpm.  after walking about 180 feet over 2 minutes, 13.    2 minutes later.  99% 86 bpm.      Therapeutic exercises:  1.  Seated ankle pumps  2.  Seated full arc quads  3.  Standing marching or hip flexion  4.  Standing hip extension  5.  Standing knee flexion  6.  Standing hip abduction        and patient performed these during my visit.  Pulse oximetry in between several of the exercises performances, results were very similar to what is represented above.    Discussion about patient status with Home Care, it was determined that additional visits will help the patient.

## 2025-01-15 ENCOUNTER — ANTICOAGULATION - WARFARIN VISIT (OUTPATIENT)
Dept: PHARMACY | Facility: HOSPITAL | Age: 70
End: 2025-01-15
Payer: MEDICARE

## 2025-01-15 DIAGNOSIS — I48.91 ATRIAL FIBRILLATION, UNSPECIFIED TYPE (MULTI): Primary | ICD-10-CM

## 2025-01-15 DIAGNOSIS — K29.90 GASTRODUODENITIS: ICD-10-CM

## 2025-01-15 LAB
INR IN PPP BY COAGULATION ASSAY EXTERNAL: 2.9 (ref 2–3)
PROTHROMBIN TIME (PT) IN PPP BY COAGULATION ASSAY EXTERNAL: ABNORMAL

## 2025-01-15 RX ORDER — AMITRIPTYLINE HYDROCHLORIDE 25 MG/1
25 TABLET, FILM COATED ORAL NIGHTLY
Qty: 90 TABLET | Refills: 0 | Status: SHIPPED | OUTPATIENT
Start: 2025-01-15

## 2025-01-15 NOTE — PROGRESS NOTES
Subjective     Emma Villela is a 69 y.o. female who presents for anticoagulation follow up.     Location: [facility]     Referring Provider: Ti Nolan MD  INR Goal: 2.0-3.0  Indication: Atrial Fibrillation/Atrial Flutter    Current Outpatient Medications on File Prior to Visit   Medication Sig Dispense Refill    acetaminophen (Tylenol) 325 mg tablet Take 1 tablet (325 mg) by mouth every 4 hours if needed.      albuterol 90 mcg/actuation inhaler Inhale 2 puffs every 6 hours if needed.      amitriptyline (Elavil) 25 mg tablet TAKE ONE TABLET BY MOUTH AT BEDTIME 90 tablet 0    aspirin 81 mg EC tablet Take 1 tablet (81 mg) by mouth once daily.      atorvastatin (Lipitor) 40 mg tablet TAKE ONE TABLET BY MOUTH DAILY 90 tablet 1    budesonide-formoteroL (Symbicort) 160-4.5 mcg/actuation inhaler Inhale 2 puffs 2 times a day. 10.2 g 11    carvedilol (Coreg) 25 mg tablet TAKE 1/2 TABLETS BY MOUTH TWICE DAILY WITH MEALS 180 tablet 0    cholecalciferol (Vitamin D-3) 125 MCG (5000 UT) capsule TAKE ONE CAPSULE BY MOUTH DAILY 90 capsule 1    ferrous gluconate 324 (38 Fe) MG tablet TAKE ONE TABLET BY MOUTH DAILY 90 tablet 1    furosemide (Lasix) 40 mg tablet Take 1 tablet (40 mg) by mouth 2 times a day. 180 tablet 1    hydrALAZINE (Apresoline) 100 mg tablet TAKE ONE TABLET BY MOUTH THREE TIMES A  tablet 1    Jantoven 7.5 mg tablet TAKE ONE TABLET BY MOUTH EVERY DAY 90 tablet 0    levothyroxine (Synthroid, Levoxyl) 100 mcg tablet Take 1 tablet (100 mcg) by mouth early in the morning.. Take 100 mcg in addition to 300 mcg daily total 400 mcg daily on an empty stomach at the same time each day, either 30 to 60 minutes prior to breakfast 90 tablet 3    levothyroxine (Synthroid, Unithroid) 300 mcg tablet Take 1 tablet (300 mcg) by mouth early in the morning.. Take 300 mcg tablet in addition to 100 mcg tablet, total 400 mcg daily on an empty stomach at the same time each day, either 30  to 60 minutes prior to breakfast 90 tablet 3    losartan (Cozaar) 100 mg tablet TAKE ONE TABLET BY MOUTH DAILY 90 tablet 1    magnesium oxide (Mag-Ox) 400 mg (241.3 mg magnesium) tablet TAKE ONE TABLET BY MOUTH TWICE A DAY AFTER MEALS 180 tablet 0    meclizine (Antivert) 25 mg tablet TAKE ONE TABLET BY MOUTH AT BEDTIME (Patient taking differently: Take 1 tablet by mouth once daily as needed for dizziness. Indications: sensation of spinning or whirling) 30 tablet 2    metOLazone (Zaroxolyn) 5 mg tablet Take 1 tablet (5 mg) by mouth. Twice a week      oxybutynin XL (Ditropan-XL) 15 mg 24 hr tablet Take 1 tablet (15 mg) by mouth once daily.      oxybutynin XL (Ditropan-XL) 5 mg 24 hr tablet TAKE ONE TABLET BY MOUTH EVERY EVENING IN ADDITION TO THE 15 MG XL TABS.      oxygen (O2) gas therapy Inhale 1 L/min continuously. Indications: DYSPNEA, ON EXERTION      pantoprazole (ProtoNix) 40 mg EC tablet TAKE ONE TABLET BY MOUTH DAILY 90 tablet 0    potassium chloride CR 20 mEq ER tablet TAKE ONE TABLET BY MOUTH EVERY 12 HOURS 180 tablet 1     No current facility-administered medications on file prior to visit.        Objective   Anticoagulation Summary  As of 1/15/2025      INR goal:  2.0-3.0   TTR:  72.6% (1.2 y)   INR used for dosin.90 (1/15/2025)   Weekly warfarin total:  37.5 mg             Lab Results   Component Value Date    INR 2.90 (N) 01/15/2025    INR 2.40 (N) 2025    INR 3.10 (A) 2024    INR 3.40 (A) 2024    INR 2.40 (N) 2024    PROTIME 21.3 (H) 2024    PROTIME 18.2 (H) 2024    PROTIME 16.2 (H) 2024           Assessment/Plan   Current INR is Therapeutic at 2.9. Previous INR was Therapeutic at 2.4. Plan to continue current warfarin regimen of 3.75 mg on Tue/Thur/Sat/Sun and 7.5 mg //.     Follow Up: 1 week    Katja Chisholm, PharmD

## 2025-01-16 ENCOUNTER — OFFICE VISIT (OUTPATIENT)
Dept: PRIMARY CARE | Facility: CLINIC | Age: 70
End: 2025-01-16
Payer: MEDICARE

## 2025-01-16 ENCOUNTER — LAB (OUTPATIENT)
Dept: LAB | Facility: LAB | Age: 70
End: 2025-01-16
Payer: MEDICARE

## 2025-01-16 ENCOUNTER — APPOINTMENT (OUTPATIENT)
Dept: PULMONOLOGY | Facility: CLINIC | Age: 70
End: 2025-01-16
Payer: MEDICARE

## 2025-01-16 VITALS
BODY MASS INDEX: 48.69 KG/M2 | SYSTOLIC BLOOD PRESSURE: 130 MMHG | TEMPERATURE: 97.5 F | WEIGHT: 293 LBS | OXYGEN SATURATION: 97 % | DIASTOLIC BLOOD PRESSURE: 80 MMHG | HEART RATE: 87 BPM

## 2025-01-16 VITALS
DIASTOLIC BLOOD PRESSURE: 82 MMHG | SYSTOLIC BLOOD PRESSURE: 139 MMHG | BODY MASS INDEX: 48.65 KG/M2 | HEART RATE: 101 BPM | OXYGEN SATURATION: 93 % | WEIGHT: 293 LBS

## 2025-01-16 DIAGNOSIS — R09.02 HYPOXIA: ICD-10-CM

## 2025-01-16 DIAGNOSIS — I48.11 LONGSTANDING PERSISTENT ATRIAL FIBRILLATION (MULTI): ICD-10-CM

## 2025-01-16 DIAGNOSIS — N18.31 STAGE 3A CHRONIC KIDNEY DISEASE (MULTI): ICD-10-CM

## 2025-01-16 DIAGNOSIS — E89.0 HYPOTHYROIDISM, POSTSURGICAL: ICD-10-CM

## 2025-01-16 DIAGNOSIS — J96.11 CHRONIC RESPIRATORY FAILURE WITH HYPOXIA (MULTI): ICD-10-CM

## 2025-01-16 DIAGNOSIS — E01.0 THYROMEGALY: Primary | ICD-10-CM

## 2025-01-16 DIAGNOSIS — G47.33 OBSTRUCTIVE SLEEP APNEA: Primary | ICD-10-CM

## 2025-01-16 DIAGNOSIS — E78.00 HYPERCHOLESTEREMIA: ICD-10-CM

## 2025-01-16 DIAGNOSIS — J43.2 CENTRILOBULAR EMPHYSEMA (MULTI): ICD-10-CM

## 2025-01-16 DIAGNOSIS — E01.0 THYROMEGALY: ICD-10-CM

## 2025-01-16 DIAGNOSIS — I50.9 CHF (NYHA CLASS III, ACC/AHA STAGE C) (MULTI): ICD-10-CM

## 2025-01-16 DIAGNOSIS — E83.42 HYPOMAGNESEMIA: ICD-10-CM

## 2025-01-16 DIAGNOSIS — I10 BENIGN ESSENTIAL HYPERTENSION: ICD-10-CM

## 2025-01-16 DIAGNOSIS — R29.6 RECURRENT FALLS: ICD-10-CM

## 2025-01-16 LAB
T4 FREE SERPL-MCNC: 2.48 NG/DL (ref 0.61–1.12)
TSH SERPL-ACNC: 0.01 MIU/L (ref 0.44–3.98)

## 2025-01-16 PROCEDURE — 1157F ADVNC CARE PLAN IN RCRD: CPT | Performed by: PEDIATRICS

## 2025-01-16 PROCEDURE — 1159F MED LIST DOCD IN RCRD: CPT | Performed by: PEDIATRICS

## 2025-01-16 PROCEDURE — 1160F RVW MEDS BY RX/DR IN RCRD: CPT | Performed by: PEDIATRICS

## 2025-01-16 PROCEDURE — 3079F DIAST BP 80-89 MM HG: CPT | Performed by: INTERNAL MEDICINE

## 2025-01-16 PROCEDURE — G2211 COMPLEX E/M VISIT ADD ON: HCPCS | Performed by: INTERNAL MEDICINE

## 2025-01-16 PROCEDURE — 3075F SYST BP GE 130 - 139MM HG: CPT | Performed by: INTERNAL MEDICINE

## 2025-01-16 PROCEDURE — 1159F MED LIST DOCD IN RCRD: CPT | Performed by: INTERNAL MEDICINE

## 2025-01-16 PROCEDURE — 99214 OFFICE O/P EST MOD 30 MIN: CPT | Performed by: INTERNAL MEDICINE

## 2025-01-16 PROCEDURE — 1036F TOBACCO NON-USER: CPT | Performed by: PEDIATRICS

## 2025-01-16 PROCEDURE — 3075F SYST BP GE 130 - 139MM HG: CPT | Performed by: PEDIATRICS

## 2025-01-16 PROCEDURE — 1036F TOBACCO NON-USER: CPT | Performed by: INTERNAL MEDICINE

## 2025-01-16 PROCEDURE — 3079F DIAST BP 80-89 MM HG: CPT | Performed by: PEDIATRICS

## 2025-01-16 PROCEDURE — 1160F RVW MEDS BY RX/DR IN RCRD: CPT | Performed by: INTERNAL MEDICINE

## 2025-01-16 PROCEDURE — 1157F ADVNC CARE PLAN IN RCRD: CPT | Performed by: INTERNAL MEDICINE

## 2025-01-16 PROCEDURE — 99215 OFFICE O/P EST HI 40 MIN: CPT | Performed by: PEDIATRICS

## 2025-01-16 RX ORDER — LANOLIN ALCOHOL/MO/W.PET/CERES
400 CREAM (GRAM) TOPICAL 2 TIMES DAILY
Qty: 180 TABLET | Refills: 0 | Status: SHIPPED | OUTPATIENT
Start: 2025-01-16

## 2025-01-16 RX ORDER — CARVEDILOL 25 MG/1
12.5 TABLET ORAL 2 TIMES DAILY
Qty: 180 TABLET | Refills: 0 | Status: SHIPPED | OUTPATIENT
Start: 2025-01-16

## 2025-01-16 RX ORDER — BUDESONIDE AND FORMOTEROL FUMARATE DIHYDRATE 160; 4.5 UG/1; UG/1
2 AEROSOL RESPIRATORY (INHALATION) 2 TIMES DAILY
Qty: 10.2 G | Refills: 11 | Status: SHIPPED | OUTPATIENT
Start: 2025-01-16

## 2025-01-16 ASSESSMENT — ENCOUNTER SYMPTOMS
DIZZINESS: 0
FATIGUE: 0
SINUS PAIN: 0
DIFFICULTY URINATING: 0
UNEXPECTED WEIGHT CHANGE: 0
FEVER: 0
ARTHRALGIAS: 0
SORE THROAT: 0
BLOOD IN STOOL: 0
DIARRHEA: 0
WHEEZING: 0
HEADACHES: 0
HYPERTENSION: 1
COUGH: 0
ABDOMINAL PAIN: 0
PALPITATIONS: 0
BRUISES/BLEEDS EASILY: 0

## 2025-01-16 NOTE — PROGRESS NOTES
Subjective   Patient ID: Emma Villela is a 69 y.o. female who presents for COPD, BECKI    HPI    7/1/19: 64-year-old  woman with severe obstructive sleep apnea (.9, 10/7/13) on CPAP 12 with medium air fit F10 fullface mask, C-Flex 3 and 3 L oxygen bleed, and allergic rhinitis, diastolic heart failure and severe COPD, recently admitted to [facility] from June 4 to Melania 15, 2019 for acute respiratory failure with hypoxia, acute and chronic diastolic heart failure, COPD exacerbation and pneumonia. The patient was discharged on a prednisone taper and 5 L of oxygen by nasal cannula. Her pulse ox on 5 L of oxygen at home is now 96-98% and wants to start decreasing her oxygen. She feels much better. She is able to walk more than 500 feet with a walker before she gets short of breath. No cough or wheezing. Decreased lower extremity edema. She has not needed her rescue inhaler. Compliant with Symbicort. No nasal complaints. She uses C Pap 8-9 hours every night. No snoring on C Pap. Feels rested with sleep. Denies excessive daytime sleepiness with an [place] sleepiness score of 1/24. She was able to get a new C Pap machine and a portable oxygen concentrator.     01/09/2020: Since her last visit she's been doing well breathing is stable. She uses 2 L of oxygen with her CPAP. She uses 2 L of oxygen continuously. She received a letter from Medicare that she will need recertified for her oxygen use. She will need a 6 minute walk. She uses Symbicort and rarely uses her pro-air. I did talk to her today about pulmonary rehabilitation, she is willing to think about pulmonary rehabilitation in April or May when the weather is better. For her oxygen recertification she does use [facility] in [place]. I did take her off of 02 today in the office and at rest she stayed at 93%.      11/12/2020: She is here today for follow-up. Her breathing is stable. She uses her Symbicort and her albuterol regularly. She is compliant  with 3 L of oxygen. Uses her CPAP regularly with 3 L oxygen bleed. Patient had fluid overload and required a chest tube in June. A few weeks ago she saw her cardiologist who prescribed an antibiotic because patient had residual chest sounds. Today she is clear. She has her normal swelling in her legs. Her  is with her today and states that she was started on another water pill patient complains of spending most of her day in the bathroom. She had surgery in the spring for an aortic aneurysm.     01/20/2022: She is being seen today for follow-up. Patient has not been using her Symbicort regularly. She says that she feels really well so she did not think she needed it. We went over what a maintenance medication is today and the importance of using it daily. She rarely ever uses her ProAir. She did have a AAA repair in the fall and is recovered from it and feeling okay. She does continue on her water pills and still complains that she uses the restroom quite a bit especially at night. She does use her CPAP at night with 3 L.     01/04/23 she is here today for follow-up. She continues to use her oxygen at 2 L during the day and 3 L with her CPAP. She is 1 her percent compliant with her CPAP using it 10 hours a night AHI is 3. She uses Symbicort but does not always remember to use it regularly. We did talk today about the importance of using her medications. She rarely ever uses her Ventolin. She has not had a PFT in a couple of years I would like her to have 1 before her next visit. She quit smoking about 2-1/2 years ago she is eligible for a low-dose screening CT I would like her to have 1 and she is agreeable.     05/17/23 she is here today for follow-up. She had a new PFT PFT does show an improvement in her overall lung function. Her FEV1 went from 42% to 55%. Patient is compliant with her Symbicort she uses her albuterol as needed. She is using oxygen at 2 to 3 L continuously. Uses her CPAP at night. Its been  about 3 years now since she quit smoking. 10 minutes spent preparing patient's chart. 15 minutes spent with patient answering questions and determining care. 10 minutes spent charting and ordering tests and medications     05/06/24 She is here today for follow-up.  She is doing well.  She continues on Symbicort.  She is using 3 L of oxygen regularly.  She uses her CPAP nightly with 3 L.  Encourage patient to be active.  She had a CT of her abdomen which does show a 7 x 5 mm nodule but this is stable since 2019.    1/16/2025:  Mrs Villela is doing well from a breathing standpoint.  She is using symbicort and rarely needs albuterol.  She uses oxygen at 3lpm when awake.  She is using her CPAP every night with 5lpm bleed in oxygen.  Her machine is now alarming it has reached it's life expectancy.       Previous pulmonary history:   She has no history of recurrent infections, or lung disease as a child. She was previously told she hasCOPD . She currently is on supplemental oxygen. She has never been to pulmonary rehab. Does recall having AECOPD requiring antibiotics or prednisone.     Inhalers/nebulized medications: Symbicort, Albuterol      Hospitalization History:  She has not been hospitalized over the last year for breathing related problem.     Sleep history:  Uses CPAP  Denies snoring, apneas, feeling tired during the day or taking naps during the day. Admits to feeling tired during the day.     Comorbidities:  AFIB  HTN  CHF        SH:  smoking: former smoker  drinking: none  illicit drug use: none     Occupation: (Full questionnaire on exposures obtained, discussed with the patient and scanned to EMR)  No known exposure to asbestos, silica or beryllium     Family History:  No family history of lung diseases or cancer     Imaging history: (I have personally reviewed the imaging below)  6/19: CXR - tiny bilateral pleural effusions and atelectasis      PFTs:  2/25/19 // -> FEV1/FVC ratio .53 /FEV1 41% (no BD  response)/FVC 60% /DLCO 46% /TLC/RV to TLC ratio .53     6 MWTs: None on record    Review of Systems    See scanned documents attached to this note for review of systems, and appropriate scales/scores for this visit.     Objective   Physical Exam  Constitutional:       Appearance: Normal appearance.   HENT:      Head: Normocephalic and atraumatic.      Mouth/Throat:      Pharynx: Oropharynx is clear.   Cardiovascular:      Rate and Rhythm: Normal rate and regular rhythm.      Pulses: Normal pulses.      Heart sounds: Normal heart sounds.   Pulmonary:      Effort: Pulmonary effort is normal.      Breath sounds: Normal breath sounds. No wheezing, rhonchi or rales.   Abdominal:      General: Bowel sounds are normal.      Palpations: Abdomen is soft.   Musculoskeletal:         General: Normal range of motion.   Skin:     General: Skin is warm and dry.   Neurological:      General: No focal deficit present.      Mental Status: She is alert and oriented to person, place, and time.   Psychiatric:         Mood and Affect: Mood normal.           Assessment/Plan     #. COPD, Stage III, Group B, markedly worsened lung function in 9 years, controlled   - Continue with Symbicort regularly 160/4.5, 2 puffs twice daily with a spacer. The patient would benefit from pulmonary rehabilitation and qualifies by PFT criteria but wants to wait until Spring to see what happens with COVID   - Symbicort needs refilled, she has only been using it on occasion, we talked today about why we use a maintenance medication and the importance of it   - She needs a new PFT it has been a couple of years.  05/17/23 new PFT actually showed improvement in her lung function. She continues on Symbicort. We did talk about Breztri today but Symbicort is working well. She uses her rescue inhaler only on occasion.  05/06/24 She continues on Symbicort. Rarely using her albuterol.    1/16/2025:  renewed symbicort today, albuterol as needed     #. Severe  obstructive sleep apnea, doing well with C Pap with good compliance and good control of sleep symptoms   - Continue with CPAP every night with sleep. with 3L oxygen bleed   - still using cpap every night with 3L, is up a lot because of her water pill   05/06/24 still using cpap with 3L. She did not bring her cpap today but will next visit   1/16/2025:  doing well with CPAAP, needs new machine.  Order for new CPAP machine at [id number] with mask of choice and 5lpm oxygen bleed in sent to [facility]      #. Chronic respiratory failure with hypoxia secondary to CHF and COPD, compliant with oxygen   - Advised patient to titrate oxygen down to maintain pulse ox between 90-92%. She is at 2 L portable oxygen continuously at baseline with 3 L oxygen bleed at night.   - Insurance is requiring a recertification, she needs a 6 min walk, she uses [facility] in [place]   - She had a walking testing showing she needs 3L with exertion   - Using 2L all the time   05/06/24 she is using 3L continuously encouraged her to keep her oxygen above 90%  1/16/2025:  continue oxygen at 3lpm continuous with 5lpm bleed in with CPAP     #. Chronic diastolic heart failure   - Continue with cardiac medications.   - Continue follow up with Cardiology      #. Allergic rhinitis, seasonal, controlled   - During allergy season, take OTC Nasacort nasal spray 2 sprays in each nostril daily and loratadine 10 mg daily.  05/06/24 she has not needed her nasal spray      #. Morbid obesity   - Encouraged weight loss with diet and exercise.     #Low-dose chest CT screen   - She is eligible because she is between the ages of 50 and 79 with a 15-year pack history   - She quit smoking 2-1/2 years ago  1/16/2025: total 10-15pack year, quit 5 years ago, does not qualify for low dose screening progran.  LLL nodule has been stable for years      Follow up 31-90days with Dr Ivey due to new CPAP machine  Follow up with me 6 months     Karl Holland MD 01/16/25 8:07 AM

## 2025-01-16 NOTE — PROGRESS NOTES
Subjective   Patient ID: Emma Villela is a 69 y.o. female who presents for Hypothyroidism and Hypertension.    - Deconditioning slowly improving continue with physical therapy  - -Thyromegaly having difficulty and pressure symptoms when she swallows  Patient not surgical candidate previously continue Xarelto due to difficult hemithyroidectomy  Treated with radioactive iodine twice  Need to cut down the levothyroxine to take 300 alternating with 400 every other day  Refer patient to endocrinology for further assessment and recommendation refer patient to  endocrinology follow-up thyroid function test closely     - Renal insufficiency stable continue with current medication Lasix 40 mg twice a day chlorthalidone twice a week  --Chronic respiratory failure continues 2 L oxygen  - Obstructive sleep apnea continue CPAP  -- Hypertension controlled continue with carvedilol half tablet twice a day patient controlled no need to go to isosorbide at this time continue monitoring closely  - afib (s/p ablation, on warfarin), COPD (on 2L continuous oxygen), morbid obesity, CVI, HTN, hypothyroidism and AAA (s/p EVAR 2016) now s/p right renal artery stenting (6x59m Shelby VBX), aortic stent graft placement ([id number] Shelby aortic cuff x2), bilateral iliac stent graft placement, and right hypogastric artery coil embolization on 1/17/2024 by Dr. Walton and Dr. Hernandez. Overnight on POD0/POD1 she developed afib RVR with hypotension requiring multiple IV metoprolol  Is still having endoleak awaiting follow-up 6 months ultrasound.  -Atrial fibrillation controlled to continue with carvedilol blood pressure controlled heart rate controlled  - Atrial fibrillation patient seen by Coumadin clinic review 1 further follow-up INR closely.  - Hypomagnesemia continue with magnesium tablet daily  - Vitamin D deficiency need to start on vitamin D 5000 daily  - Hypokalemia continue with potassium 40 mg twice a day follow-up BMP for further  recommendation  -Follow-up 3 months      Hypertension  Pertinent negatives include no chest pain, headaches or palpitations.          Review of Systems   Constitutional:  Negative for fatigue, fever and unexpected weight change.   HENT:  Negative for congestion, ear discharge, ear pain, mouth sores, sinus pain and sore throat.    Eyes:  Negative for visual disturbance.   Respiratory:  Negative for cough and wheezing.    Cardiovascular:  Negative for chest pain, palpitations and leg swelling.   Gastrointestinal:  Negative for abdominal pain, blood in stool and diarrhea.   Genitourinary:  Negative for difficulty urinating.   Musculoskeletal:  Negative for arthralgias.   Skin:  Negative for rash.   Neurological:  Negative for dizziness and headaches.   Hematological:  Does not bruise/bleed easily.   Psychiatric/Behavioral:  Negative for behavioral problems.    All other systems reviewed and are negative.      Objective   Lab Results   Component Value Date    HGBA1C 5.5 12/28/2023      /80   Pulse 87   Temp 36.4 °C (97.5 °F)   Wt 141 kg (310 lb 14.4 oz)   LMP  (LMP Unknown)   SpO2 97%   BMI 48.69 kg/m²     Physical Exam  Vitals and nursing note reviewed.   Constitutional:       Appearance: Normal appearance.   HENT:      Head: Normocephalic.      Nose: Nose normal.   Eyes:      Conjunctiva/sclera: Conjunctivae normal.      Pupils: Pupils are equal, round, and reactive to light.   Neck:      Comments: Thyromegaly  Cardiovascular:      Rate and Rhythm: Regular rhythm.   Pulmonary:      Effort: Pulmonary effort is normal.      Breath sounds: Normal breath sounds.   Abdominal:      General: Abdomen is flat.      Palpations: Abdomen is soft.   Musculoskeletal:      Cervical back: Neck supple.      Right lower leg: Edema present.      Left lower leg: Edema present.   Skin:     General: Skin is warm.   Neurological:      General: No focal deficit present.      Mental Status: She is oriented to person, place, and  time.   Psychiatric:         Mood and Affect: Mood normal.         Assessment/Plan   Emma was seen today for hypothyroidism and hypertension.  Diagnoses and all orders for this visit:  Thyromegaly (Primary)  -     TSH with reflex to Free T4 if abnormal; Future  Benign essential hypertension  -     carvedilol (Coreg) 25 mg tablet; Take 0.5 tablets (12.5 mg) by mouth 2 times a day.  Hypomagnesemia  -     magnesium oxide (Mag-Ox) 400 mg (241.3 mg magnesium) tablet; Take 1 tablet (400 mg) by mouth 2 times a day.  Chronic respiratory failure with hypoxia (Multi)  CHF (NYHA class III, ACC/AHA stage C) (Multi)  Longstanding persistent atrial fibrillation (Multi)  Body mass index (BMI) 50.0-59.9, adult (Multi)  Stage 3a chronic kidney disease (Multi)  Hypercholesteremia  Recurrent falls  Hypothyroidism, postsurgical   - Deconditioning slowly improving continue with physical therapy  - -Thyromegaly having difficulty and pressure symptoms when she swallows  Patient not surgical candidate previously continue Xarelto due to difficult hemithyroidectomy  Treated with radioactive iodine twice  Need to cut down the levothyroxine to take 300 alternating with 400 every other day  Refer patient to endocrinology for further assessment and recommendation refer patient to  endocrinology follow-up thyroid function test closely     - Renal insufficiency stable continue with current medication Lasix 40 mg twice a day chlorthalidone twice a week  --Chronic respiratory failure continues 2 L oxygen  - Obstructive sleep apnea continue CPAP  -- Hypertension controlled continue with carvedilol half tablet twice a day patient controlled no need to go to isosorbide at this time continue monitoring closely  - afib (s/p ablation, on warfarin), COPD (on 2L continuous oxygen), morbid obesity, CVI, HTN, hypothyroidism and AAA (s/p EVAR 2016) now s/p right renal artery stenting (6x59m Powers VBX), aortic stent graft placement ([id number] Powers aortic cuff  x2), bilateral iliac stent graft placement, and right hypogastric artery coil embolization on 1/17/2024 by Dr. Walton and Dr. Hernandez. Overnight on POD0/POD1 she developed afib RVR with hypotension requiring multiple IV metoprolol  Is still having endoleak awaiting follow-up 6 months ultrasound.  -Atrial fibrillation controlled to continue with carvedilol blood pressure controlled heart rate controlled  - Atrial fibrillation patient seen by Coumadin clinic review 1 further follow-up INR closely.  - Hypomagnesemia continue with magnesium tablet daily  - Vitamin D deficiency need to start on vitamin D 5000 daily  - Hypokalemia continue with potassium 40 mg twice a day follow-up BMP for further recommendation  -Follow-up 3 months

## 2025-01-17 LAB
ABO GROUP (TYPE) IN BLOOD: NORMAL
ANTIBODY SCREEN: NORMAL
RH FACTOR (ANTIGEN D): NORMAL

## 2025-01-23 ENCOUNTER — HOME CARE VISIT (OUTPATIENT)
Dept: HOME HEALTH SERVICES | Facility: HOME HEALTH | Age: 70
End: 2025-01-23
Payer: MEDICARE

## 2025-01-23 ENCOUNTER — ANTICOAGULATION - WARFARIN VISIT (OUTPATIENT)
Dept: PHARMACY | Facility: HOSPITAL | Age: 70
End: 2025-01-23
Payer: MEDICARE

## 2025-01-23 VITALS
SYSTOLIC BLOOD PRESSURE: 138 MMHG | HEART RATE: 82 BPM | DIASTOLIC BLOOD PRESSURE: 82 MMHG | TEMPERATURE: 98.1 F | OXYGEN SATURATION: 92 %

## 2025-01-23 DIAGNOSIS — I48.91 ATRIAL FIBRILLATION, UNSPECIFIED TYPE (MULTI): Primary | ICD-10-CM

## 2025-01-23 PROCEDURE — G0157 HHC PT ASSISTANT EA 15: HCPCS | Mod: CQ,HHH

## 2025-01-23 ASSESSMENT — ENCOUNTER SYMPTOMS
PAIN LOCATION - PAIN FREQUENCY: CONSTANT
PAIN LOCATION - PAIN SEVERITY: 2/10
PAIN LOCATION: LEFT KNEE
PAIN LOCATION: RIGHT KNEE
PERSON REPORTING PAIN: PATIENT
PAIN LOCATION - PAIN SEVERITY: 2/10
SUBJECTIVE PAIN PROGRESSION: UNCHANGED
LOWEST PAIN SEVERITY IN PAST 24 HOURS: 2/10
PAIN LOCATION - PAIN QUALITY: THROBBING
HIGHEST PAIN SEVERITY IN PAST 24 HOURS: 2/10
PAIN: 1
PAIN LOCATION - EXACERBATING FACTORS: WEATHER, ACTIVITY
PAIN LOCATION - EXACERBATING FACTORS: WEATHER, ACTIVITY
PAIN LOCATION - PAIN FREQUENCY: CONSTANT
PAIN LOCATION - PAIN QUALITY: THROBBING

## 2025-01-23 NOTE — PROGRESS NOTES
Subjective     Emma Villela is a 70 y.o. female who presents for anticoagulation follow up.     Location: [facility]     Referring Provider: Ti Nolan MD  INR Goal: 2.0-3.0  Indication: Atrial Fibrillation/Atrial Flutter    Current Outpatient Medications on File Prior to Visit   Medication Sig Dispense Refill    acetaminophen (Tylenol) 325 mg tablet Take 1 tablet (325 mg) by mouth every 4 hours if needed.      albuterol 90 mcg/actuation inhaler Inhale 2 puffs every 6 hours if needed.      amitriptyline (Elavil) 25 mg tablet TAKE ONE TABLET BY MOUTH AT BEDTIME 90 tablet 0    aspirin 81 mg EC tablet Take 1 tablet (81 mg) by mouth once daily.      atorvastatin (Lipitor) 40 mg tablet TAKE ONE TABLET BY MOUTH DAILY 90 tablet 1    budesonide-formoteroL (Symbicort) 160-4.5 mcg/actuation inhaler Inhale 2 puffs 2 times a day. 10.2 g 11    carvedilol (Coreg) 25 mg tablet Take 0.5 tablets (12.5 mg) by mouth 2 times a day. 180 tablet 0    cholecalciferol (Vitamin D-3) 125 MCG (5000 UT) capsule TAKE ONE CAPSULE BY MOUTH DAILY 90 capsule 1    ferrous gluconate 324 (38 Fe) MG tablet TAKE ONE TABLET BY MOUTH DAILY 90 tablet 1    furosemide (Lasix) 40 mg tablet Take 1 tablet (40 mg) by mouth 2 times a day. 180 tablet 1    hydrALAZINE (Apresoline) 100 mg tablet TAKE ONE TABLET BY MOUTH THREE TIMES A  tablet 1    Jantoven 7.5 mg tablet TAKE ONE TABLET BY MOUTH EVERY DAY 90 tablet 0    levothyroxine (Synthroid, Levoxyl) 100 mcg tablet Take 1 tablet (100 mcg) by mouth early in the morning.. Take 100 mcg in addition to 300 mcg daily total 400 mcg daily on an empty stomach at the same time each day, either 30 to 60 minutes prior to breakfast 90 tablet 3    levothyroxine (Synthroid, Unithroid) 300 mcg tablet Take 1 tablet (300 mcg) by mouth early in the morning.. Take 300 mcg tablet in addition to 100 mcg tablet, total 400 mcg daily on an empty stomach at the same time each day, either  30 to 60 minutes prior to breakfast 90 tablet 3    losartan (Cozaar) 100 mg tablet TAKE ONE TABLET BY MOUTH DAILY 90 tablet 1    magnesium oxide (Mag-Ox) 400 mg (241.3 mg magnesium) tablet Take 1 tablet (400 mg) by mouth 2 times a day. 180 tablet 0    meclizine (Antivert) 25 mg tablet TAKE ONE TABLET BY MOUTH AT BEDTIME 30 tablet 2    metOLazone (Zaroxolyn) 5 mg tablet Take 1 tablet (5 mg) by mouth. Twice a week      oxybutynin XL (Ditropan-XL) 15 mg 24 hr tablet Take 1 tablet (15 mg) by mouth once daily.      oxybutynin XL (Ditropan-XL) 5 mg 24 hr tablet TAKE ONE TABLET BY MOUTH EVERY EVENING IN ADDITION TO THE 15 MG XL TABS.      oxygen (O2) gas therapy Inhale 1 L/min continuously. Indications: DYSPNEA, ON EXERTION      pantoprazole (ProtoNix) 40 mg EC tablet TAKE ONE TABLET BY MOUTH DAILY 90 tablet 0    potassium chloride CR 20 mEq ER tablet TAKE ONE TABLET BY MOUTH EVERY 12 HOURS 180 tablet 1     No current facility-administered medications on file prior to visit.        Objective   Anticoagulation Summary  As of 1/23/2025      INR goal:  2.0-3.0   TTR:  72.6% (1.2 y)   INR used for dosing:  --             Lab Results   Component Value Date    INR 2.90 (N) 01/15/2025    INR 2.40 (N) 01/07/2025    INR 3.10 (A) 12/31/2024    INR 3.40 (A) 11/21/2024    INR 2.40 (N) 11/07/2024    PROTIME 21.3 (H) 01/21/2024    PROTIME 18.2 (H) 01/20/2024    PROTIME 16.2 (H) 01/19/2024             Assessment/Plan   Current INR is Therapeutic at 2.2. Previous INR was Therapeutic at 2.9. Plan to continue current warfarin regimen of 3.75 mg on Tue/Thur/Sat/Sun and 7.5 mg M/W/F.     Follow Up: 1 week    Katja Chisholm, PharmD

## 2025-01-28 ENCOUNTER — ANTICOAGULATION - WARFARIN VISIT (OUTPATIENT)
Dept: PHARMACY | Facility: HOSPITAL | Age: 70
End: 2025-01-28
Payer: MEDICARE

## 2025-01-28 DIAGNOSIS — I48.91 ATRIAL FIBRILLATION, UNSPECIFIED TYPE (MULTI): Primary | ICD-10-CM

## 2025-01-28 NOTE — PROGRESS NOTES
Subjective     Emma Villela is a 70 y.o. female who presents for anticoagulation follow up.     Location: [facility]     Referring Provider: Ti Nolan MD  INR Goal: 2.0-3.0  Indication: Atrial Fibrillation/Atrial Flutter    Current Outpatient Medications on File Prior to Visit   Medication Sig Dispense Refill    acetaminophen (Tylenol) 325 mg tablet Take 1 tablet (325 mg) by mouth every 4 hours if needed.      albuterol 90 mcg/actuation inhaler Inhale 2 puffs every 6 hours if needed.      amitriptyline (Elavil) 25 mg tablet TAKE ONE TABLET BY MOUTH AT BEDTIME 90 tablet 0    aspirin 81 mg EC tablet Take 1 tablet (81 mg) by mouth once daily.      atorvastatin (Lipitor) 40 mg tablet TAKE ONE TABLET BY MOUTH DAILY 90 tablet 1    budesonide-formoteroL (Symbicort) 160-4.5 mcg/actuation inhaler Inhale 2 puffs 2 times a day. 10.2 g 11    carvedilol (Coreg) 25 mg tablet Take 0.5 tablets (12.5 mg) by mouth 2 times a day. 180 tablet 0    cholecalciferol (Vitamin D-3) 125 MCG (5000 UT) capsule TAKE ONE CAPSULE BY MOUTH DAILY 90 capsule 1    ferrous gluconate 324 (38 Fe) MG tablet TAKE ONE TABLET BY MOUTH DAILY 90 tablet 1    furosemide (Lasix) 40 mg tablet Take 1 tablet (40 mg) by mouth 2 times a day. 180 tablet 1    hydrALAZINE (Apresoline) 100 mg tablet TAKE ONE TABLET BY MOUTH THREE TIMES A  tablet 1    Jantoven 7.5 mg tablet TAKE ONE TABLET BY MOUTH EVERY DAY 90 tablet 0    levothyroxine (Synthroid, Levoxyl) 100 mcg tablet Take 1 tablet (100 mcg) by mouth early in the morning.. Take 100 mcg in addition to 300 mcg daily total 400 mcg daily on an empty stomach at the same time each day, either 30 to 60 minutes prior to breakfast 90 tablet 3    levothyroxine (Synthroid, Unithroid) 300 mcg tablet Take 1 tablet (300 mcg) by mouth early in the morning.. Take 300 mcg tablet in addition to 100 mcg tablet, total 400 mcg daily on an empty stomach at the same time each day, either  30 to 60 minutes prior to breakfast 90 tablet 3    losartan (Cozaar) 100 mg tablet TAKE ONE TABLET BY MOUTH DAILY 90 tablet 1    magnesium oxide (Mag-Ox) 400 mg (241.3 mg magnesium) tablet Take 1 tablet (400 mg) by mouth 2 times a day. 180 tablet 0    meclizine (Antivert) 25 mg tablet TAKE ONE TABLET BY MOUTH AT BEDTIME 30 tablet 2    metOLazone (Zaroxolyn) 5 mg tablet Take 1 tablet (5 mg) by mouth. Twice a week      oxybutynin XL (Ditropan-XL) 15 mg 24 hr tablet Take 1 tablet (15 mg) by mouth once daily.      oxybutynin XL (Ditropan-XL) 5 mg 24 hr tablet TAKE ONE TABLET BY MOUTH EVERY EVENING IN ADDITION TO THE 15 MG XL TABS.      oxygen (O2) gas therapy Inhale 1 L/min continuously. Indications: DYSPNEA, ON EXERTION      pantoprazole (ProtoNix) 40 mg EC tablet TAKE ONE TABLET BY MOUTH DAILY 90 tablet 0    potassium chloride CR 20 mEq ER tablet TAKE ONE TABLET BY MOUTH EVERY 12 HOURS 180 tablet 1     No current facility-administered medications on file prior to visit.        Objective   Anticoagulation Summary  As of 2025      INR goal:  2.0-3.0   TTR:  73.4% (1.2 y)   INR used for dosin.30 (2025)   Weekly warfarin total:  37.5 mg             Lab Results   Component Value Date    INR 2.30 (N) 2025    INR 2.20 (N) 2025    INR 2.90 (N) 01/15/2025    INR 3.40 (A) 2024    INR 2.40 (N) 2024    PROTIME 21.3 (H) 2024    PROTIME 18.2 (H) 2024    PROTIME 16.2 (H) 2024             Assessment/Plan   Current INR is Therapeutic at 2.3. Previous INR was Therapeutic at 2.2. Plan to continue current warfarin regimen of 3.75 mg on Tue/Thur/Sat/Sun and 7.5 mg //.     Follow Up: 1 week    Katja Chisholm, PharmD

## 2025-01-29 DIAGNOSIS — E78.00 HYPERCHOLESTEROLEMIA: ICD-10-CM

## 2025-01-29 RX ORDER — ATORVASTATIN CALCIUM 40 MG/1
40 TABLET, FILM COATED ORAL DAILY
Qty: 90 TABLET | Refills: 0 | Status: SHIPPED | OUTPATIENT
Start: 2025-01-29

## 2025-01-30 ENCOUNTER — HOME CARE VISIT (OUTPATIENT)
Dept: HOME HEALTH SERVICES | Facility: HOME HEALTH | Age: 70
End: 2025-01-30
Payer: MEDICARE

## 2025-01-30 PROCEDURE — G0157 HHC PT ASSISTANT EA 15: HCPCS | Mod: CQ,HHH

## 2025-01-31 VITALS
SYSTOLIC BLOOD PRESSURE: 120 MMHG | OXYGEN SATURATION: 97 % | HEART RATE: 85 BPM | TEMPERATURE: 97.6 F | DIASTOLIC BLOOD PRESSURE: 60 MMHG

## 2025-01-31 ASSESSMENT — ENCOUNTER SYMPTOMS
PAIN LOCATION - PAIN SEVERITY: 2/10
PAIN LOCATION: RIGHT KNEE
PAIN LOCATION - PAIN SEVERITY: 2/10
HIGHEST PAIN SEVERITY IN PAST 24 HOURS: 2/10
PERSON REPORTING PAIN: PATIENT
PAIN LOCATION - PAIN QUALITY: THROBBING
PAIN LOCATION - EXACERBATING FACTORS: ACTIVITY
PAIN LOCATION - EXACERBATING FACTORS: ACTIVITY
PAIN LOCATION - PAIN QUALITY: THROBBING
PAIN LOCATION: LEFT KNEE
PAIN LOCATION - PAIN FREQUENCY: CONSTANT
LOWEST PAIN SEVERITY IN PAST 24 HOURS: 1/10
PAIN: 1
PAIN LOCATION - PAIN FREQUENCY: CONSTANT

## 2025-02-04 ENCOUNTER — ANTICOAGULATION - WARFARIN VISIT (OUTPATIENT)
Dept: PHARMACY | Facility: HOSPITAL | Age: 70
End: 2025-02-04
Payer: MEDICARE

## 2025-02-04 DIAGNOSIS — I48.91 ATRIAL FIBRILLATION, UNSPECIFIED TYPE (MULTI): Primary | ICD-10-CM

## 2025-02-04 DIAGNOSIS — E55.9 VITAMIN D DEFICIENCY: ICD-10-CM

## 2025-02-04 LAB
INR IN PPP BY COAGULATION ASSAY EXTERNAL: 3.4 (ref 2–3)
PROTHROMBIN TIME (PT) IN PPP BY COAGULATION ASSAY EXTERNAL: ABNORMAL

## 2025-02-04 RX ORDER — VIT C/E/ZN/COPPR/LUTEIN/ZEAXAN 250MG-90MG
125 CAPSULE ORAL DAILY
Qty: 90 CAPSULE | Refills: 0 | Status: SHIPPED | OUTPATIENT
Start: 2025-02-04

## 2025-02-04 NOTE — PROGRESS NOTES
Subjective     Emma Villela is a 70 y.o. female who presents for anticoagulation follow up.     Location: [facility]     Referring Provider: Ti Nolan MD  INR Goal: 2.0-3.0  Indication: Atrial Fibrillation/Atrial Flutter    Current Outpatient Medications on File Prior to Visit   Medication Sig Dispense Refill    acetaminophen (Tylenol) 325 mg tablet Take 1 tablet (325 mg) by mouth every 4 hours if needed.      albuterol 90 mcg/actuation inhaler Inhale 2 puffs every 6 hours if needed.      amitriptyline (Elavil) 25 mg tablet TAKE ONE TABLET BY MOUTH AT BEDTIME 90 tablet 0    aspirin 81 mg EC tablet Take 1 tablet (81 mg) by mouth once daily.      atorvastatin (Lipitor) 40 mg tablet TAKE ONE TABLET BY MOUTH EVERY DAY 90 tablet 0    budesonide-formoteroL (Symbicort) 160-4.5 mcg/actuation inhaler Inhale 2 puffs 2 times a day. 10.2 g 11    carvedilol (Coreg) 25 mg tablet Take 0.5 tablets (12.5 mg) by mouth 2 times a day. 180 tablet 0    cholecalciferol (Vitamin D-3) 125 MCG (5000 UT) capsule TAKE ONE CAPSULE BY MOUTH DAILY 90 capsule 1    ferrous gluconate 324 (38 Fe) MG tablet TAKE ONE TABLET BY MOUTH DAILY 90 tablet 1    furosemide (Lasix) 40 mg tablet Take 1 tablet (40 mg) by mouth 2 times a day. 180 tablet 1    hydrALAZINE (Apresoline) 100 mg tablet TAKE ONE TABLET BY MOUTH THREE TIMES A  tablet 1    Jantoven 7.5 mg tablet TAKE ONE TABLET BY MOUTH EVERY DAY 90 tablet 0    levothyroxine (Synthroid, Levoxyl) 100 mcg tablet Take 1 tablet (100 mcg) by mouth early in the morning.. Take 100 mcg in addition to 300 mcg daily total 400 mcg daily on an empty stomach at the same time each day, either 30 to 60 minutes prior to breakfast 90 tablet 3    levothyroxine (Synthroid, Unithroid) 300 mcg tablet Take 1 tablet (300 mcg) by mouth early in the morning.. Take 300 mcg tablet in addition to 100 mcg tablet, total 400 mcg daily on an empty stomach at the same time each day,  either 30 to 60 minutes prior to breakfast 90 tablet 3    losartan (Cozaar) 100 mg tablet TAKE ONE TABLET BY MOUTH DAILY 90 tablet 1    magnesium oxide (Mag-Ox) 400 mg (241.3 mg magnesium) tablet Take 1 tablet (400 mg) by mouth 2 times a day. 180 tablet 0    meclizine (Antivert) 25 mg tablet TAKE ONE TABLET BY MOUTH AT BEDTIME 30 tablet 2    metOLazone (Zaroxolyn) 5 mg tablet Take 1 tablet (5 mg) by mouth. Twice a week      oxybutynin XL (Ditropan-XL) 15 mg 24 hr tablet Take 1 tablet (15 mg) by mouth once daily.      oxybutynin XL (Ditropan-XL) 5 mg 24 hr tablet TAKE ONE TABLET BY MOUTH EVERY EVENING IN ADDITION TO THE 15 MG XL TABS.      oxygen (O2) gas therapy Inhale 1 L/min continuously. Indications: DYSPNEA, ON EXERTION      pantoprazole (ProtoNix) 40 mg EC tablet TAKE ONE TABLET BY MOUTH DAILY 90 tablet 0    potassium chloride CR 20 mEq ER tablet TAKE ONE TABLET BY MOUTH EVERY 12 HOURS 180 tablet 1    [DISCONTINUED] atorvastatin (Lipitor) 40 mg tablet TAKE ONE TABLET BY MOUTH DAILY 90 tablet 1     No current facility-administered medications on file prior to visit.        Objective   Anticoagulation Summary  As of 2/4/2025      INR goal:  2.0-3.0   TTR:  73.4% (1.2 y)   INR used for dosing:  --             Lab Results   Component Value Date    INR 2.30 (N) 01/28/2025    INR 2.20 (N) 01/23/2025    INR 2.90 (N) 01/15/2025    INR 3.40 (A) 11/21/2024    INR 2.40 (N) 11/07/2024    PROTIME 21.3 (H) 01/21/2024    PROTIME 18.2 (H) 01/20/2024    PROTIME 16.2 (H) 01/19/2024         Assessment/Plan   Current INR is Supratherapeutic at 3.4. Previous INR was Therapeutic at 2.3. Reports she may have been eating less vitamin K containing foods. Getting a bloody nose every couple days. Stops within 5 minutes with pressure. Plan to hold warfarin x1 dose today, then resume current warfarin regimen of 3.75 mg on Tue/Thur/Sat/Sun and 7.5 mg M/W/F.     Follow Up: 1 week    Katja Chisholm, PharmD

## 2025-02-06 ENCOUNTER — HOME CARE VISIT (OUTPATIENT)
Dept: HOME HEALTH SERVICES | Facility: HOME HEALTH | Age: 70
End: 2025-02-06
Payer: MEDICARE

## 2025-02-06 VITALS
TEMPERATURE: 98 F | DIASTOLIC BLOOD PRESSURE: 86 MMHG | OXYGEN SATURATION: 95 % | SYSTOLIC BLOOD PRESSURE: 130 MMHG | HEART RATE: 93 BPM

## 2025-02-06 PROCEDURE — G0157 HHC PT ASSISTANT EA 15: HCPCS | Mod: CQ,HHH

## 2025-02-06 ASSESSMENT — ENCOUNTER SYMPTOMS
PAIN LOCATION - PAIN FREQUENCY: CONSTANT
PAIN LOCATION - PAIN DURATION: CHRONIC
PAIN LOCATION: LEFT KNEE
SUBJECTIVE PAIN PROGRESSION: UNCHANGED
PAIN LOCATION - PAIN QUALITY: ACHING
PERSON REPORTING PAIN: PATIENT
PAIN LOCATION - PAIN SEVERITY: 2/10
PAIN LOCATION - PAIN SEVERITY: 2/10
PAIN: 1
PAIN LOCATION - EXACERBATING FACTORS: ACTIVITY
PAIN LOCATION: RIGHT KNEE
PAIN LOCATION - PAIN QUALITY: ACHING

## 2025-02-11 ENCOUNTER — HOME CARE VISIT (OUTPATIENT)
Dept: HOME HEALTH SERVICES | Facility: HOME HEALTH | Age: 70
End: 2025-02-11
Payer: MEDICARE

## 2025-02-11 ENCOUNTER — ANTICOAGULATION - WARFARIN VISIT (OUTPATIENT)
Dept: PHARMACY | Facility: HOSPITAL | Age: 70
End: 2025-02-11
Payer: MEDICARE

## 2025-02-11 DIAGNOSIS — I48.91 ATRIAL FIBRILLATION, UNSPECIFIED TYPE (MULTI): Primary | ICD-10-CM

## 2025-02-11 LAB
INR IN PPP BY COAGULATION ASSAY EXTERNAL: 2 (ref 2–3)
PROTHROMBIN TIME (PT) IN PPP BY COAGULATION ASSAY EXTERNAL: ABNORMAL

## 2025-02-11 PROCEDURE — G0151 HHCP-SERV OF PT,EA 15 MIN: HCPCS | Mod: HHH

## 2025-02-11 ASSESSMENT — ENCOUNTER SYMPTOMS
PAIN LOCATION - PAIN SEVERITY: 2/10
SUBJECTIVE PAIN PROGRESSION: GRADUALLY IMPROVING
LOWEST PAIN SEVERITY IN PAST 24 HOURS: 2/10
PAIN: 1
PAIN LOCATION: LEFT KNEE
PAIN SEVERITY GOAL: 0/10
HIGHEST PAIN SEVERITY IN PAST 24 HOURS: 2/10
PERSON REPORTING PAIN: PATIENT
PAIN LOCATION - PAIN SEVERITY: 2/10
PAIN LOCATION: RIGHT KNEE

## 2025-02-11 ASSESSMENT — ACTIVITIES OF DAILY LIVING (ADL)
OASIS_M1830: 01
AMBULATION ASSISTANCE: MINIMUM ASSIST
AMBULATION ASSISTANCE: 1
HOME_HEALTH_OASIS: 01

## 2025-02-11 NOTE — HOME HEALTH
No signficant news.  Last visit scheduled.  Overall things have been good.    TUG 21 seconds   Resting pulse ox reading 96% 119 bpm.   Pulse ox reading 91% 143 bpm. after walking about 200 feet over 2 minutes, 35 seconds. 2 minutes later it was 94% 147 bpm.   Therapeutic exercises:   1. Seated ankle pumps   2. Seated full arc quads   3. Standing marching or hip flexion   4. Standing hip extension   5. Standing knee flexion   6. Standing hip abduction   and patient performed these during my visit.    It was agreed that additional visits are not necessary as of today.  Patient agreed with agency DC plans.

## 2025-02-11 NOTE — PROGRESS NOTES
Subjective     Emma Villela is a 70 y.o. female who presents for anticoagulation follow up.     Location: [facility]     Referring Provider: Ti Nolan MD  INR Goal: 2.0-3.0  Indication: Atrial Fibrillation/Atrial Flutter    Current Outpatient Medications on File Prior to Visit   Medication Sig Dispense Refill    acetaminophen (Tylenol) 325 mg tablet Take 1 tablet (325 mg) by mouth every 4 hours if needed.      albuterol 90 mcg/actuation inhaler Inhale 2 puffs every 6 hours if needed.      amitriptyline (Elavil) 25 mg tablet TAKE ONE TABLET BY MOUTH AT BEDTIME 90 tablet 0    aspirin 81 mg EC tablet Take 1 tablet (81 mg) by mouth once daily.      atorvastatin (Lipitor) 40 mg tablet TAKE ONE TABLET BY MOUTH EVERY DAY 90 tablet 0    budesonide-formoteroL (Symbicort) 160-4.5 mcg/actuation inhaler Inhale 2 puffs 2 times a day. 10.2 g 11    carvedilol (Coreg) 25 mg tablet Take 0.5 tablets (12.5 mg) by mouth 2 times a day. 180 tablet 0    cholecalciferol (Vitamin D-3) 125 mcg (5000 UT) capsule TAKE ONE CAPSULE BY MOUTH DAILY 90 capsule 0    ferrous gluconate 324 (38 Fe) MG tablet TAKE ONE TABLET BY MOUTH DAILY 90 tablet 1    furosemide (Lasix) 40 mg tablet Take 1 tablet (40 mg) by mouth 2 times a day. 180 tablet 1    hydrALAZINE (Apresoline) 100 mg tablet TAKE ONE TABLET BY MOUTH THREE TIMES A  tablet 1    Jantoven 7.5 mg tablet TAKE ONE TABLET BY MOUTH EVERY DAY 90 tablet 0    levothyroxine (Synthroid, Levoxyl) 100 mcg tablet Take 1 tablet (100 mcg) by mouth early in the morning.. Take 100 mcg in addition to 300 mcg daily total 400 mcg daily on an empty stomach at the same time each day, either 30 to 60 minutes prior to breakfast 90 tablet 3    levothyroxine (Synthroid, Unithroid) 300 mcg tablet Take 1 tablet (300 mcg) by mouth early in the morning.. Take 300 mcg tablet in addition to 100 mcg tablet, total 400 mcg daily on an empty stomach at the same time each day,  either 30 to 60 minutes prior to breakfast 90 tablet 3    losartan (Cozaar) 100 mg tablet TAKE ONE TABLET BY MOUTH DAILY 90 tablet 1    magnesium oxide (Mag-Ox) 400 mg (241.3 mg magnesium) tablet Take 1 tablet (400 mg) by mouth 2 times a day. 180 tablet 0    meclizine (Antivert) 25 mg tablet TAKE ONE TABLET BY MOUTH AT BEDTIME 30 tablet 2    metOLazone (Zaroxolyn) 5 mg tablet Take 1 tablet (5 mg) by mouth. Twice a week      oxybutynin XL (Ditropan-XL) 15 mg 24 hr tablet Take 1 tablet (15 mg) by mouth once daily.      oxybutynin XL (Ditropan-XL) 5 mg 24 hr tablet TAKE ONE TABLET BY MOUTH EVERY EVENING IN ADDITION TO THE 15 MG XL TABS.      oxygen (O2) gas therapy Inhale 1 L/min continuously. Indications: DYSPNEA, ON EXERTION      pantoprazole (ProtoNix) 40 mg EC tablet TAKE ONE TABLET BY MOUTH DAILY 90 tablet 0    potassium chloride CR 20 mEq ER tablet TAKE ONE TABLET BY MOUTH EVERY 12 HOURS 180 tablet 1     No current facility-administered medications on file prior to visit.        Objective   Anticoagulation Summary  As of 2/11/2025      INR goal:  2.0-3.0   TTR:  73.3% (1.2 y)   INR used for dosing:  --             Lab Results   Component Value Date    INR 3.40 (A) 02/04/2025    INR 2.30 (N) 01/28/2025    INR 2.20 (N) 01/23/2025    INR 3.40 (A) 11/21/2024    INR 2.40 (N) 11/07/2024    PROTIME 21.3 (H) 01/21/2024    PROTIME 18.2 (H) 01/20/2024    PROTIME 16.2 (H) 01/19/2024           Assessment/Plan   Current INR is Therapeutic at 2.0. Previous INR was Supratherapeutic at 3.4. INR is back within therapeutic range. Plan to continue current warfarin regimen of 3.75 mg on Tue/Thur/Sat/Sun and 7.5 mg M/W/F.     Follow Up: 1 week    Katja Chisholm, PharmD

## 2025-02-18 ENCOUNTER — ANTICOAGULATION - WARFARIN VISIT (OUTPATIENT)
Dept: PHARMACY | Facility: HOSPITAL | Age: 70
End: 2025-02-18
Payer: MEDICARE

## 2025-02-18 DIAGNOSIS — I48.91 ATRIAL FIBRILLATION, UNSPECIFIED TYPE (MULTI): Primary | ICD-10-CM

## 2025-02-18 LAB
INR IN PPP BY COAGULATION ASSAY EXTERNAL: 1.8 (ref 2–3)
PROTHROMBIN TIME (PT) IN PPP BY COAGULATION ASSAY EXTERNAL: ABNORMAL

## 2025-02-18 NOTE — PROGRESS NOTES
Subjective     Emma Villela is a 70 y.o. female who presents for anticoagulation follow up.     Location: [facility]     Referring Provider: Ti Nolan MD  INR Goal: 2.0-3.0  Indication: Atrial Fibrillation/Atrial Flutter    Current Outpatient Medications on File Prior to Visit   Medication Sig Dispense Refill    acetaminophen (Tylenol) 325 mg tablet Take 1 tablet (325 mg) by mouth every 4 hours if needed.      albuterol 90 mcg/actuation inhaler Inhale 2 puffs every 6 hours if needed.      amitriptyline (Elavil) 25 mg tablet TAKE ONE TABLET BY MOUTH AT BEDTIME 90 tablet 0    aspirin 81 mg EC tablet Take 1 tablet (81 mg) by mouth once daily.      atorvastatin (Lipitor) 40 mg tablet TAKE ONE TABLET BY MOUTH EVERY DAY 90 tablet 0    budesonide-formoteroL (Symbicort) 160-4.5 mcg/actuation inhaler Inhale 2 puffs 2 times a day. 10.2 g 11    carvedilol (Coreg) 25 mg tablet Take 0.5 tablets (12.5 mg) by mouth 2 times a day. 180 tablet 0    cholecalciferol (Vitamin D-3) 125 mcg (5000 UT) capsule TAKE ONE CAPSULE BY MOUTH DAILY 90 capsule 0    ferrous gluconate 324 (38 Fe) MG tablet TAKE ONE TABLET BY MOUTH DAILY 90 tablet 1    furosemide (Lasix) 40 mg tablet Take 1 tablet (40 mg) by mouth 2 times a day. 180 tablet 1    hydrALAZINE (Apresoline) 100 mg tablet TAKE ONE TABLET BY MOUTH THREE TIMES A  tablet 1    Jantoven 7.5 mg tablet TAKE ONE TABLET BY MOUTH EVERY DAY 90 tablet 0    levothyroxine (Synthroid, Levoxyl) 100 mcg tablet Take 1 tablet (100 mcg) by mouth early in the morning.. Take 100 mcg in addition to 300 mcg daily total 400 mcg daily on an empty stomach at the same time each day, either 30 to 60 minutes prior to breakfast 90 tablet 3    levothyroxine (Synthroid, Unithroid) 300 mcg tablet Take 1 tablet (300 mcg) by mouth early in the morning.. Take 300 mcg tablet in addition to 100 mcg tablet, total 400 mcg daily on an empty stomach at the same time each day,  either 30 to 60 minutes prior to breakfast 90 tablet 3    losartan (Cozaar) 100 mg tablet TAKE ONE TABLET BY MOUTH DAILY 90 tablet 1    magnesium oxide (Mag-Ox) 400 mg (241.3 mg magnesium) tablet Take 1 tablet (400 mg) by mouth 2 times a day. 180 tablet 0    meclizine (Antivert) 25 mg tablet TAKE ONE TABLET BY MOUTH AT BEDTIME 30 tablet 2    metOLazone (Zaroxolyn) 5 mg tablet Take 1 tablet (5 mg) by mouth. Twice a week      oxybutynin XL (Ditropan-XL) 15 mg 24 hr tablet Take 1 tablet (15 mg) by mouth once daily.      oxybutynin XL (Ditropan-XL) 5 mg 24 hr tablet TAKE ONE TABLET BY MOUTH EVERY EVENING IN ADDITION TO THE 15 MG XL TABS.      oxygen (O2) gas therapy Inhale 1 L/min continuously. Indications: DYSPNEA, ON EXERTION      pantoprazole (ProtoNix) 40 mg EC tablet TAKE ONE TABLET BY MOUTH DAILY 90 tablet 0    potassium chloride CR 20 mEq ER tablet TAKE ONE TABLET BY MOUTH EVERY 12 HOURS 180 tablet 1     No current facility-administered medications on file prior to visit.        Objective   Anticoagulation Summary  As of 2/18/2025      INR goal:  2.0-3.0   TTR:  73.2% (1.2 y)   INR used for dosing:  --             Lab Results   Component Value Date    INR 2.00 (N) 02/11/2025    INR 3.40 (A) 02/04/2025    INR 2.30 (N) 01/28/2025    INR 3.40 (A) 11/21/2024    INR 2.40 (N) 11/07/2024    PROTIME 21.3 (H) 01/21/2024    PROTIME 18.2 (H) 01/20/2024    PROTIME 16.2 (H) 01/19/2024             Assessment/Plan   Current INR is Subtherapeutic at 1.8. Previous INR was Therapeutic at 2.0. INR is back within therapeutic range. Advised patient to take 11.25 mg x1 dose tonight, then resume current warfarin regimen of 3.75 mg on Tue/Thur/Sat/Sun and 7.5 mg M/W/F.     Follow Up: 1 week    Katja Chisholm, PharmD

## 2025-02-19 DIAGNOSIS — E87.6 HYPOKALEMIA: ICD-10-CM

## 2025-02-19 RX ORDER — POTASSIUM CHLORIDE 20 MEQ/1
20 TABLET, EXTENDED RELEASE ORAL EVERY 12 HOURS
Qty: 180 TABLET | Refills: 0 | Status: SHIPPED | OUTPATIENT
Start: 2025-02-19

## 2025-02-24 ENCOUNTER — ANTICOAGULATION - WARFARIN VISIT (OUTPATIENT)
Dept: PHARMACY | Facility: HOSPITAL | Age: 70
End: 2025-02-24
Payer: MEDICARE

## 2025-02-24 DIAGNOSIS — I48.91 ATRIAL FIBRILLATION, UNSPECIFIED TYPE (MULTI): Primary | ICD-10-CM

## 2025-02-24 NOTE — PROGRESS NOTES
Subjective     Emma Villela is a 70 y.o. female who presents for anticoagulation follow up.     Location: [facility]     Referring Provider: Ti Nolan MD  INR Goal: 2.0-3.0  Indication: Atrial Fibrillation/Atrial Flutter    Current Outpatient Medications on File Prior to Visit   Medication Sig Dispense Refill    acetaminophen (Tylenol) 325 mg tablet Take 1 tablet (325 mg) by mouth every 4 hours if needed.      albuterol 90 mcg/actuation inhaler Inhale 2 puffs every 6 hours if needed.      amitriptyline (Elavil) 25 mg tablet TAKE ONE TABLET BY MOUTH AT BEDTIME 90 tablet 0    aspirin 81 mg EC tablet Take 1 tablet (81 mg) by mouth once daily.      atorvastatin (Lipitor) 40 mg tablet TAKE ONE TABLET BY MOUTH EVERY DAY 90 tablet 0    budesonide-formoteroL (Symbicort) 160-4.5 mcg/actuation inhaler Inhale 2 puffs 2 times a day. 10.2 g 11    carvedilol (Coreg) 25 mg tablet Take 0.5 tablets (12.5 mg) by mouth 2 times a day. 180 tablet 0    cholecalciferol (Vitamin D-3) 125 mcg (5000 UT) capsule TAKE ONE CAPSULE BY MOUTH DAILY 90 capsule 0    ferrous gluconate 324 (38 Fe) MG tablet TAKE ONE TABLET BY MOUTH DAILY 90 tablet 1    furosemide (Lasix) 40 mg tablet Take 1 tablet (40 mg) by mouth 2 times a day. 180 tablet 1    hydrALAZINE (Apresoline) 100 mg tablet TAKE ONE TABLET BY MOUTH THREE TIMES A  tablet 1    Jantoven 7.5 mg tablet TAKE ONE TABLET BY MOUTH EVERY DAY 90 tablet 0    levothyroxine (Synthroid, Levoxyl) 100 mcg tablet Take 1 tablet (100 mcg) by mouth early in the morning.. Take 100 mcg in addition to 300 mcg daily total 400 mcg daily on an empty stomach at the same time each day, either 30 to 60 minutes prior to breakfast 90 tablet 3    levothyroxine (Synthroid, Unithroid) 300 mcg tablet Take 1 tablet (300 mcg) by mouth early in the morning.. Take 300 mcg tablet in addition to 100 mcg tablet, total 400 mcg daily on an empty stomach at the same time each day,  either 30 to 60 minutes prior to breakfast 90 tablet 3    losartan (Cozaar) 100 mg tablet TAKE ONE TABLET BY MOUTH DAILY 90 tablet 1    magnesium oxide (Mag-Ox) 400 mg (241.3 mg magnesium) tablet Take 1 tablet (400 mg) by mouth 2 times a day. 180 tablet 0    meclizine (Antivert) 25 mg tablet TAKE ONE TABLET BY MOUTH AT BEDTIME 30 tablet 2    metOLazone (Zaroxolyn) 5 mg tablet Take 1 tablet (5 mg) by mouth. Twice a week      oxybutynin XL (Ditropan-XL) 15 mg 24 hr tablet Take 1 tablet (15 mg) by mouth once daily.      oxybutynin XL (Ditropan-XL) 5 mg 24 hr tablet TAKE ONE TABLET BY MOUTH EVERY EVENING IN ADDITION TO THE 15 MG XL TABS.      oxygen (O2) gas therapy Inhale 1 L/min continuously. Indications: DYSPNEA, ON EXERTION      pantoprazole (ProtoNix) 40 mg EC tablet TAKE ONE TABLET BY MOUTH DAILY 90 tablet 0    potassium chloride CR 20 mEq ER tablet Take 1 tablet (20 mEq) by mouth every 12 hours. 180 tablet 0    [DISCONTINUED] potassium chloride CR 20 mEq ER tablet TAKE ONE TABLET BY MOUTH EVERY 12 HOURS 180 tablet 1     No current facility-administered medications on file prior to visit.        Objective   Anticoagulation Summary  As of 2/24/2025      INR goal:  2.0-3.0   TTR:  72.1% (1.2 y)   INR used for dosing:  --             Lab Results   Component Value Date    INR 1.80 (A) 02/18/2025    INR 2.00 (N) 02/11/2025    INR 3.40 (A) 02/04/2025    INR 3.40 (A) 11/21/2024    INR 2.40 (N) 11/07/2024    PROTIME 21.3 (H) 01/21/2024    PROTIME 18.2 (H) 01/20/2024    PROTIME 16.2 (H) 01/19/2024         Assessment/Plan   Current INR is Therapeutic at 2.5. Previous INR was Subtherapeutic at 1.8. INR is back within therapeutic range. Advised patient to continue current warfarin regimen of 3.75 mg on Tue/Thur/Sat/Sun and 7.5 mg M/W/F.     Follow Up: 1 week    Katja Chisholm, PharmD

## 2025-03-03 ENCOUNTER — ANTICOAGULATION - WARFARIN VISIT (OUTPATIENT)
Dept: PHARMACY | Facility: HOSPITAL | Age: 70
End: 2025-03-03
Payer: MEDICARE

## 2025-03-03 DIAGNOSIS — I48.91 ATRIAL FIBRILLATION, UNSPECIFIED TYPE (MULTI): Primary | ICD-10-CM

## 2025-03-03 NOTE — PROGRESS NOTES
Subjective     Emma Villela is a 70 y.o. female who presents for anticoagulation follow up.     Location: [facility]     Referring Provider: Ti Nolan MD  INR Goal: 2.0-3.0  Indication: Atrial Fibrillation/Atrial Flutter    Current Outpatient Medications on File Prior to Visit   Medication Sig Dispense Refill    acetaminophen (Tylenol) 325 mg tablet Take 1 tablet (325 mg) by mouth every 4 hours if needed.      albuterol 90 mcg/actuation inhaler Inhale 2 puffs every 6 hours if needed.      amitriptyline (Elavil) 25 mg tablet TAKE ONE TABLET BY MOUTH AT BEDTIME 90 tablet 0    aspirin 81 mg EC tablet Take 1 tablet (81 mg) by mouth once daily.      atorvastatin (Lipitor) 40 mg tablet TAKE ONE TABLET BY MOUTH EVERY DAY 90 tablet 0    budesonide-formoteroL (Symbicort) 160-4.5 mcg/actuation inhaler Inhale 2 puffs 2 times a day. 10.2 g 11    carvedilol (Coreg) 25 mg tablet Take 0.5 tablets (12.5 mg) by mouth 2 times a day. 180 tablet 0    cholecalciferol (Vitamin D-3) 125 mcg (5000 UT) capsule TAKE ONE CAPSULE BY MOUTH DAILY 90 capsule 0    ferrous gluconate 324 (38 Fe) MG tablet TAKE ONE TABLET BY MOUTH DAILY 90 tablet 1    furosemide (Lasix) 40 mg tablet Take 1 tablet (40 mg) by mouth 2 times a day. 180 tablet 1    hydrALAZINE (Apresoline) 100 mg tablet TAKE ONE TABLET BY MOUTH THREE TIMES A  tablet 1    Jantoven 7.5 mg tablet TAKE ONE TABLET BY MOUTH EVERY DAY 90 tablet 0    levothyroxine (Synthroid, Levoxyl) 100 mcg tablet Take 1 tablet (100 mcg) by mouth early in the morning.. Take 100 mcg in addition to 300 mcg daily total 400 mcg daily on an empty stomach at the same time each day, either 30 to 60 minutes prior to breakfast 90 tablet 3    levothyroxine (Synthroid, Unithroid) 300 mcg tablet Take 1 tablet (300 mcg) by mouth early in the morning.. Take 300 mcg tablet in addition to 100 mcg tablet, total 400 mcg daily on an empty stomach at the same time each day,  either 30 to 60 minutes prior to breakfast 90 tablet 3    losartan (Cozaar) 100 mg tablet TAKE ONE TABLET BY MOUTH DAILY 90 tablet 1    magnesium oxide (Mag-Ox) 400 mg (241.3 mg magnesium) tablet Take 1 tablet (400 mg) by mouth 2 times a day. 180 tablet 0    meclizine (Antivert) 25 mg tablet TAKE ONE TABLET BY MOUTH AT BEDTIME 30 tablet 2    metOLazone (Zaroxolyn) 5 mg tablet Take 1 tablet (5 mg) by mouth. Twice a week      oxybutynin XL (Ditropan-XL) 15 mg 24 hr tablet Take 1 tablet (15 mg) by mouth once daily.      oxybutynin XL (Ditropan-XL) 5 mg 24 hr tablet TAKE ONE TABLET BY MOUTH EVERY EVENING IN ADDITION TO THE 15 MG XL TABS.      oxygen (O2) gas therapy Inhale 1 L/min continuously. Indications: DYSPNEA, ON EXERTION      pantoprazole (ProtoNix) 40 mg EC tablet TAKE ONE TABLET BY MOUTH DAILY 90 tablet 0    potassium chloride CR 20 mEq ER tablet Take 1 tablet (20 mEq) by mouth every 12 hours. 180 tablet 0     No current facility-administered medications on file prior to visit.        Objective   Anticoagulation Summary  As of 3/3/2025      INR goal:  2.0-3.0   TTR:  72.5% (1.3 y)   INR used for dosin.60 (3/3/2025)   Weekly warfarin total:  37.5 mg             Lab Results   Component Value Date    INR 2.60 (N) 2025    INR 2.50 (N) 2025    INR 1.80 (A) 2025    INR 3.40 (A) 2024    INR 2.40 (N) 2024    PROTIME 21.3 (H) 2024    PROTIME 18.2 (H) 2024    PROTIME 16.2 (H) 2024           Assessment/Plan   Current INR is Therapeutic at 2.6. Previous INR was Therapeutic at 2.5. INR remains therapeutic, no changes to regimen needed. Advised patient to continue current warfarin regimen of 3.75 mg on Tu/Thur/Sat/Sun and 7.5 mg //.     Follow Up: 1 week home INR     Katja Chisholm, PharmD

## 2025-03-10 ENCOUNTER — ANTICOAGULATION - WARFARIN VISIT (OUTPATIENT)
Dept: PHARMACY | Facility: HOSPITAL | Age: 70
End: 2025-03-10
Payer: MEDICARE

## 2025-03-10 DIAGNOSIS — I48.91 ATRIAL FIBRILLATION, UNSPECIFIED TYPE (MULTI): Primary | ICD-10-CM

## 2025-03-10 NOTE — PROGRESS NOTES
Subjective     Emma Villela is a 70 y.o. female who presents for anticoagulation follow up.     Location: [facility]     Referring Provider: Ti Nolan MD  INR Goal: 2.0-3.0  Indication: Atrial Fibrillation/Atrial Flutter    Current Outpatient Medications on File Prior to Visit   Medication Sig Dispense Refill    acetaminophen (Tylenol) 325 mg tablet Take 1 tablet (325 mg) by mouth every 4 hours if needed.      albuterol 90 mcg/actuation inhaler Inhale 2 puffs every 6 hours if needed.      amitriptyline (Elavil) 25 mg tablet TAKE ONE TABLET BY MOUTH AT BEDTIME 90 tablet 0    aspirin 81 mg EC tablet Take 1 tablet (81 mg) by mouth once daily.      atorvastatin (Lipitor) 40 mg tablet TAKE ONE TABLET BY MOUTH EVERY DAY 90 tablet 0    budesonide-formoteroL (Symbicort) 160-4.5 mcg/actuation inhaler Inhale 2 puffs 2 times a day. 10.2 g 11    carvedilol (Coreg) 25 mg tablet Take 0.5 tablets (12.5 mg) by mouth 2 times a day. 180 tablet 0    cholecalciferol (Vitamin D-3) 125 mcg (5000 UT) capsule TAKE ONE CAPSULE BY MOUTH DAILY 90 capsule 0    ferrous gluconate 324 (38 Fe) MG tablet TAKE ONE TABLET BY MOUTH DAILY 90 tablet 1    furosemide (Lasix) 40 mg tablet Take 1 tablet (40 mg) by mouth 2 times a day. 180 tablet 1    hydrALAZINE (Apresoline) 100 mg tablet TAKE ONE TABLET BY MOUTH THREE TIMES A  tablet 1    Jantoven 7.5 mg tablet TAKE ONE TABLET BY MOUTH EVERY DAY 90 tablet 0    levothyroxine (Synthroid, Levoxyl) 100 mcg tablet Take 1 tablet (100 mcg) by mouth early in the morning.. Take 100 mcg in addition to 300 mcg daily total 400 mcg daily on an empty stomach at the same time each day, either 30 to 60 minutes prior to breakfast 90 tablet 3    levothyroxine (Synthroid, Unithroid) 300 mcg tablet Take 1 tablet (300 mcg) by mouth early in the morning.. Take 300 mcg tablet in addition to 100 mcg tablet, total 400 mcg daily on an empty stomach at the same time each day,  either 30 to 60 minutes prior to breakfast 90 tablet 3    losartan (Cozaar) 100 mg tablet TAKE ONE TABLET BY MOUTH DAILY 90 tablet 1    magnesium oxide (Mag-Ox) 400 mg (241.3 mg magnesium) tablet Take 1 tablet (400 mg) by mouth 2 times a day. 180 tablet 0    meclizine (Antivert) 25 mg tablet TAKE ONE TABLET BY MOUTH AT BEDTIME 30 tablet 2    metOLazone (Zaroxolyn) 5 mg tablet Take 1 tablet (5 mg) by mouth. Twice a week      oxybutynin XL (Ditropan-XL) 15 mg 24 hr tablet Take 1 tablet (15 mg) by mouth once daily.      oxybutynin XL (Ditropan-XL) 5 mg 24 hr tablet TAKE ONE TABLET BY MOUTH EVERY EVENING IN ADDITION TO THE 15 MG XL TABS.      oxygen (O2) gas therapy Inhale 1 L/min continuously. Indications: DYSPNEA, ON EXERTION      pantoprazole (ProtoNix) 40 mg EC tablet TAKE ONE TABLET BY MOUTH DAILY 90 tablet 0    potassium chloride CR 20 mEq ER tablet Take 1 tablet (20 mEq) by mouth every 12 hours. 180 tablet 0     No current facility-administered medications on file prior to visit.        Objective   Anticoagulation Summary  As of 3/10/2025      INR goal:  2.0-3.0   TTR:  72.6% (1.3 y)   INR used for dosin.80 (3/10/2025)   Weekly warfarin total:  41.25 mg             Lab Results   Component Value Date    INR 1.80 (A) 03/10/2025    INR 2.60 (N) 2025    INR 2.50 (N) 2025    INR 3.40 (A) 2024    INR 2.40 (N) 2024    PROTIME 21.3 (H) 2024    PROTIME 18.2 (H) 2024    PROTIME 16.2 (H) 2024         Assessment/Plan   Current INR is Subtherapeutic at 1.8. Previous INR was Therapeutic at 2.6. Eating more vitamin K containing foods including more salads. Patient plans to continue current vitamin K consumption. Advised patient to increase current warfarin regimen to 3.75 mg on Tue/Thur/Sun and 7.5 mg ///Sun.     Follow Up: 1 week home INR     Katja Chisholm, PharmD

## 2025-03-12 ENCOUNTER — TELEPHONE (OUTPATIENT)
Dept: PRIMARY CARE | Facility: CLINIC | Age: 70
End: 2025-03-12

## 2025-03-12 ENCOUNTER — APPOINTMENT (OUTPATIENT)
Dept: PRIMARY CARE | Facility: CLINIC | Age: 70
End: 2025-03-12
Payer: MEDICARE

## 2025-03-12 VITALS
TEMPERATURE: 97.7 F | DIASTOLIC BLOOD PRESSURE: 80 MMHG | WEIGHT: 293 LBS | HEART RATE: 106 BPM | SYSTOLIC BLOOD PRESSURE: 136 MMHG | OXYGEN SATURATION: 95 % | BODY MASS INDEX: 48.87 KG/M2

## 2025-03-12 DIAGNOSIS — I71.40 ABDOMINAL AORTIC ANEURYSM (AAA) WITHOUT RUPTURE, UNSPECIFIED PART (CMS-HCC): ICD-10-CM

## 2025-03-12 DIAGNOSIS — D64.89 ANEMIA DUE TO OTHER CAUSE, NOT CLASSIFIED: ICD-10-CM

## 2025-03-12 DIAGNOSIS — G47.33 OBSTRUCTIVE SLEEP APNEA: ICD-10-CM

## 2025-03-12 DIAGNOSIS — I73.9 PERIPHERAL VASCULAR DISEASE (CMS-HCC): ICD-10-CM

## 2025-03-12 DIAGNOSIS — I48.11 LONGSTANDING PERSISTENT ATRIAL FIBRILLATION (MULTI): ICD-10-CM

## 2025-03-12 DIAGNOSIS — E78.00 HYPERCHOLESTEREMIA: ICD-10-CM

## 2025-03-12 DIAGNOSIS — N18.32 CHRONIC KIDNEY DISEASE, STAGE 3B (MULTI): Primary | ICD-10-CM

## 2025-03-12 DIAGNOSIS — J96.11 CHRONIC RESPIRATORY FAILURE WITH HYPOXIA (MULTI): ICD-10-CM

## 2025-03-12 PROCEDURE — G2211 COMPLEX E/M VISIT ADD ON: HCPCS | Performed by: INTERNAL MEDICINE

## 2025-03-12 PROCEDURE — 3079F DIAST BP 80-89 MM HG: CPT | Performed by: INTERNAL MEDICINE

## 2025-03-12 PROCEDURE — 1036F TOBACCO NON-USER: CPT | Performed by: INTERNAL MEDICINE

## 2025-03-12 PROCEDURE — 1159F MED LIST DOCD IN RCRD: CPT | Performed by: INTERNAL MEDICINE

## 2025-03-12 PROCEDURE — 1157F ADVNC CARE PLAN IN RCRD: CPT | Performed by: INTERNAL MEDICINE

## 2025-03-12 PROCEDURE — 1160F RVW MEDS BY RX/DR IN RCRD: CPT | Performed by: INTERNAL MEDICINE

## 2025-03-12 PROCEDURE — 3075F SYST BP GE 130 - 139MM HG: CPT | Performed by: INTERNAL MEDICINE

## 2025-03-12 PROCEDURE — 99214 OFFICE O/P EST MOD 30 MIN: CPT | Performed by: INTERNAL MEDICINE

## 2025-03-12 RX ORDER — SEMAGLUTIDE 0.25 MG/.5ML
0.25 INJECTION, SOLUTION SUBCUTANEOUS WEEKLY
Qty: 2 ML | Refills: 0 | Status: SHIPPED | OUTPATIENT
Start: 2025-03-12 | End: 2025-04-03

## 2025-03-12 RX ORDER — FERROUS GLUCONATE 324(38)MG
1 TABLET ORAL DAILY
Qty: 90 TABLET | Refills: 1 | Status: SHIPPED | OUTPATIENT
Start: 2025-03-12

## 2025-03-12 RX ORDER — ASPIRIN 81 MG/1
81 TABLET ORAL DAILY
Qty: 90 TABLET | Refills: 3 | Status: SHIPPED | OUTPATIENT
Start: 2025-03-12 | End: 2026-03-12

## 2025-03-12 ASSESSMENT — ENCOUNTER SYMPTOMS
HEADACHES: 0
BRUISES/BLEEDS EASILY: 0
BLOOD IN STOOL: 0
DIZZINESS: 0
ARTHRALGIAS: 0
UNEXPECTED WEIGHT CHANGE: 0
SORE THROAT: 0
HYPERTENSION: 1
DIFFICULTY URINATING: 0
FEVER: 0
WHEEZING: 0
DIARRHEA: 0
ABDOMINAL PAIN: 0
COUGH: 0
SINUS PAIN: 0
FATIGUE: 0
PALPITATIONS: 0

## 2025-03-12 NOTE — PROGRESS NOTES
Subjective   Patient ID: Emma Villela is a 70 y.o. female who presents for Hypertension, Med Refill, Anemia, Results (Thyrroid testing), and Hypothyroidism.    Deconditioning slowly improving continue with physical therapy  - Recent blood work reviewed with patient the plan of care  - -Thyromegaly having difficulty and pressure symptoms when she swallows patient Cardoni levothyroxine take now 300 alternating with 400 every other day follow-up endocrinology for further recommendation history of hemithyroidectomy  -Morbid obesity counseled about weight loss patient may benefit from Wegovy treatment preauthorization obtained patient cannot afford the medication due to high deductible continue with low-carb diet  - afib (s/p ablation, on warfarin), COPD (on 2L continuous oxygen), morbid obesity, CVI, HTN, hypothyroidism and AAA (s/p EVAR 2016) now s/p right renal artery stenting (6x59m [place] VBX), aortic stent graft placement ([id number] [place] aortic cuff x2), bilateral iliac stent graft placement, and right hypogastric artery coil embolization on 1/17/2024  - Renal insufficiency stable continue with current medication Lasix 40 mg twice a day chlorthalidone twice a week.  --Chronic respiratory failure continues 2 L oxygen  - Obstructive sleep apnea continue CPAP.  -- Hypertension controlled continue with carvedilol half tablet twice a day patient controlled no need to go to isosorbide at this time continue monitoring closely  - -Atrial fibrillation controlled to continue with carvedilol blood pressure controlled heart rate controlled  - Atrial fibrillation patient seen by Coumadin clinic review 1 further follow-up INR closely.  - Hypomagnesemia continue with magnesium tablet daily  - Vitamin D deficiency need to start on vitamin D 5000 daily  - Hypokalemia continue with potassium 40 mg twice a day follow-up BMP for further recommendation  -Follow-up 3 months            Hypertension  Pertinent negatives include no chest  pain, headaches or palpitations.   Med Refill  Pertinent negatives include no abdominal pain, arthralgias, chest pain, congestion, coughing, fatigue, fever, headaches, rash or sore throat.   Anemia  There has been no abdominal pain, bruising/bleeding easily, fever or palpitations.          Review of Systems   Constitutional:  Negative for fatigue, fever and unexpected weight change.   HENT:  Negative for congestion, ear discharge, ear pain, mouth sores, sinus pain and sore throat.    Eyes:  Negative for visual disturbance.   Respiratory:  Negative for cough and wheezing.    Cardiovascular:  Negative for chest pain, palpitations and leg swelling.   Gastrointestinal:  Negative for abdominal pain, blood in stool and diarrhea.   Genitourinary:  Negative for difficulty urinating.   Musculoskeletal:  Negative for arthralgias.   Skin:  Negative for rash.   Neurological:  Negative for dizziness and headaches.   Hematological:  Does not bruise/bleed easily.   Psychiatric/Behavioral:  Negative for behavioral problems.    All other systems reviewed and are negative.      Objective   Lab Results   Component Value Date    HGBA1C 5.5 12/28/2023      /80   Pulse 106   Temp 36.5 °C (97.7 °F)   Wt 142 kg (312 lb)   LMP  (LMP Unknown)   SpO2 95%   BMI 48.87 kg/m²   Lab Results   Component Value Date    WBC 6.9 06/12/2024    HGB 10.2 (L) 06/12/2024    HCT 32.7 (L) 06/12/2024     06/12/2024    CHOL 133 06/12/2024    TRIG 129 06/12/2024    HDL 25.8 06/12/2024    ALT 10 06/12/2024    AST 17 06/12/2024     08/06/2024    K 3.9 08/06/2024    CL 96 (L) 08/06/2024    CREATININE 1.30 (H) 08/06/2024    BUN 24 (H) 08/06/2024    CO2 40 (HH) 08/06/2024    TSH 0.01 (L) 01/16/2025    INR 1.80 (A) 03/10/2025    HGBA1C 5.5 12/28/2023     par   Physical Exam  Vitals and nursing note reviewed.   Constitutional:       Appearance: Normal appearance. She is obese.   HENT:      Head: Normocephalic.      Nose: Nose normal.   Eyes:       Conjunctiva/sclera: Conjunctivae normal.      Pupils: Pupils are equal, round, and reactive to light.   Cardiovascular:      Rate and Rhythm: Regular rhythm.   Pulmonary:      Effort: Pulmonary effort is normal.      Breath sounds: Normal breath sounds.   Abdominal:      General: Abdomen is flat.      Palpations: Abdomen is soft.   Musculoskeletal:      Cervical back: Neck supple.   Skin:     General: Skin is warm.   Neurological:      General: No focal deficit present.      Mental Status: She is oriented to person, place, and time.   Psychiatric:         Mood and Affect: Mood normal.         Assessment/Plan   Emma was seen today for hypertension, med refill, anemia, results and hypothyroidism.  Diagnoses and all orders for this visit:  Chronic kidney disease, stage 3b (Multi) (Primary)  Anemia due to other cause, not classified  -     ferrous gluconate 324 (38 Fe) mg tablet; Take 1 tablet (324 mg) by mouth once daily.  Longstanding persistent atrial fibrillation (Multi)  -     semaglutide, weight loss, (Wegovy) 0.25 mg/0.5 mL pen injector; Inject 0.25 mg under the skin 1 (one) time per week for 4 doses.  Chronic respiratory failure with hypoxia (Multi)  -     semaglutide, weight loss, (Wegovy) 0.25 mg/0.5 mL pen injector; Inject 0.25 mg under the skin 1 (one) time per week for 4 doses.  Obstructive sleep apnea  -     semaglutide, weight loss, (Wegovy) 0.25 mg/0.5 mL pen injector; Inject 0.25 mg under the skin 1 (one) time per week for 4 doses.  Abdominal aortic aneurysm (AAA) without rupture, unspecified part (CMS-[facility])  -     semaglutide, weight loss, (Wegovy) 0.25 mg/0.5 mL pen injector; Inject 0.25 mg under the skin 1 (one) time per week for 4 doses.  Peripheral vascular disease (CMS-[facility])  -     semaglutide, weight loss, (Wegovy) 0.25 mg/0.5 mL pen injector; Inject 0.25 mg under the skin 1 (one) time per week for 4 doses.  Hypercholesteremia  -     semaglutide, weight loss, (Wegovy) 0.25 mg/0.5 mL pen  injector; Inject 0.25 mg under the skin 1 (one) time per week for 4 doses.  Body mass index (BMI) 50.0-59.9, adult (Multi)  -     semaglutide, weight loss, (Wegovy) 0.25 mg/0.5 mL pen injector; Inject 0.25 mg under the skin 1 (one) time per week for 4 doses.  Other orders  -     Follow Up In Primary Care - Established   Deconditioning slowly improving continue with physical therapy  - Recent blood work reviewed with patient the plan of care  - -Thyromegaly having difficulty and pressure symptoms when she swallows patient Cardoni levothyroxine take now 300 alternating with 400 every other day follow-up endocrinology for further recommendation history of hemithyroidectomy  -Morbid obesity counseled about weight loss patient may benefit from Wegovy treatment preauthorization obtained patient cannot afford the medication due to high deductible continue with low-carb diet  - afib (s/p ablation, on warfarin), COPD (on 2L continuous oxygen), morbid obesity, CVI, HTN, hypothyroidism and AAA (s/p EVAR 2016) now s/p right renal artery stenting (6x59m [place] VBX), aortic stent graft placement ([id number] [place] aortic cuff x2), bilateral iliac stent graft placement, and right hypogastric artery coil embolization on 1/17/2024  - Renal insufficiency stable continue with current medication Lasix 40 mg twice a day chlorthalidone twice a week.  --Chronic respiratory failure continues 2 L oxygen  - Obstructive sleep apnea continue CPAP.  -- Hypertension controlled continue with carvedilol half tablet twice a day patient controlled no need to go to isosorbide at this time continue monitoring closely  - -Atrial fibrillation controlled to continue with carvedilol blood pressure controlled heart rate controlled  - Atrial fibrillation patient seen by Coumadin clinic review 1 further follow-up INR closely.  - Hypomagnesemia continue with magnesium tablet daily  - Vitamin D deficiency need to start on vitamin D 5000 daily  - Hypokalemia continue  with potassium 40 mg twice a day follow-up BMP for further recommendation  -Follow-up 3 months

## 2025-03-17 ENCOUNTER — ANTICOAGULATION - WARFARIN VISIT (OUTPATIENT)
Dept: PHARMACY | Facility: HOSPITAL | Age: 70
End: 2025-03-17
Payer: MEDICARE

## 2025-03-17 DIAGNOSIS — I48.91 ATRIAL FIBRILLATION, UNSPECIFIED TYPE (MULTI): Primary | ICD-10-CM

## 2025-03-17 LAB
INR IN PPP BY COAGULATION ASSAY EXTERNAL: 1.9 (ref 2–3)
PROTHROMBIN TIME (PT) IN PPP BY COAGULATION ASSAY EXTERNAL: ABNORMAL

## 2025-03-17 NOTE — PROGRESS NOTES
Subjective     Emma Villela is a 70 y.o. female who presents for anticoagulation follow up.     Location: [facility]     Referring Provider: Ti Nolan MD  INR Goal: 2.0-3.0  Indication: Atrial Fibrillation/Atrial Flutter    Bleeding signs/symptoms: No  Bruising: No   Major bleeding event: No  Thrombosis signs/symptoms: No  Thromboembolic event: No  Missed doses: No  Extra doses: No  Medication changes: Yes - Aspirin 81mg daily initiated 5 days ago   Dietary changes: Yes - increased intake of greens   Change in health: No  Change in activity: No  Alcohol: No  Other concerns: No    Current Outpatient Medications on File Prior to Visit   Medication Sig Dispense Refill    acetaminophen (Tylenol) 325 mg tablet Take 1 tablet (325 mg) by mouth every 4 hours if needed.      albuterol 90 mcg/actuation inhaler Inhale 2 puffs every 6 hours if needed.      amitriptyline (Elavil) 25 mg tablet TAKE ONE TABLET BY MOUTH AT BEDTIME 90 tablet 0    aspirin 81 mg EC tablet Take 1 tablet (81 mg) by mouth once daily. 90 tablet 3    atorvastatin (Lipitor) 40 mg tablet TAKE ONE TABLET BY MOUTH EVERY DAY 90 tablet 0    budesonide-formoteroL (Symbicort) 160-4.5 mcg/actuation inhaler Inhale 2 puffs 2 times a day. 10.2 g 11    carvedilol (Coreg) 25 mg tablet Take 0.5 tablets (12.5 mg) by mouth 2 times a day. 180 tablet 0    cholecalciferol (Vitamin D-3) 125 mcg (5000 UT) capsule TAKE ONE CAPSULE BY MOUTH DAILY 90 capsule 0    ferrous gluconate 324 (38 Fe) mg tablet Take 1 tablet (324 mg) by mouth once daily. 90 tablet 1    furosemide (Lasix) 40 mg tablet Take 1 tablet (40 mg) by mouth 2 times a day. 180 tablet 1    hydrALAZINE (Apresoline) 100 mg tablet TAKE ONE TABLET BY MOUTH THREE TIMES A  tablet 1    Jantoven 7.5 mg tablet TAKE ONE TABLET BY MOUTH EVERY DAY 90 tablet 0    levothyroxine (Synthroid, Levoxyl) 100 mcg tablet Take 1 tablet (100 mcg) by mouth early in the morning.. Take 100  mcg in addition to 300 mcg daily total 400 mcg daily on an empty stomach at the same time each day, either 30 to 60 minutes prior to breakfast 90 tablet 3    levothyroxine (Synthroid, Unithroid) 300 mcg tablet Take 1 tablet (300 mcg) by mouth early in the morning.. Take 300 mcg tablet in addition to 100 mcg tablet, total 400 mcg daily on an empty stomach at the same time each day, either 30 to 60 minutes prior to breakfast 90 tablet 3    losartan (Cozaar) 100 mg tablet TAKE ONE TABLET BY MOUTH DAILY 90 tablet 1    magnesium oxide (Mag-Ox) 400 mg (241.3 mg magnesium) tablet Take 1 tablet (400 mg) by mouth 2 times a day. 180 tablet 0    meclizine (Antivert) 25 mg tablet TAKE ONE TABLET BY MOUTH AT BEDTIME 30 tablet 2    metOLazone (Zaroxolyn) 5 mg tablet Take 1 tablet (5 mg) by mouth. Twice a week      oxybutynin XL (Ditropan-XL) 15 mg 24 hr tablet Take 1 tablet (15 mg) by mouth once daily.      oxybutynin XL (Ditropan-XL) 5 mg 24 hr tablet TAKE ONE TABLET BY MOUTH EVERY EVENING IN ADDITION TO THE 15 MG XL TABS.      oxygen (O2) gas therapy Inhale 1 L/min continuously. Indications: DYSPNEA, ON EXERTION      pantoprazole (ProtoNix) 40 mg EC tablet TAKE ONE TABLET BY MOUTH DAILY 90 tablet 0    potassium chloride CR 20 mEq ER tablet Take 1 tablet (20 mEq) by mouth every 12 hours. 180 tablet 0    semaglutide, weight loss, (Wegovy) 0.25 mg/0.5 mL pen injector Inject 0.25 mg under the skin 1 (one) time per week for 4 doses. 2 mL 0    [DISCONTINUED] aspirin 81 mg EC tablet Take 1 tablet (81 mg) by mouth once daily. (Patient taking differently: Take 1 tablet (81 mg) by mouth once daily as needed.)      [DISCONTINUED] ferrous gluconate 324 (38 Fe) MG tablet TAKE ONE TABLET BY MOUTH DAILY 90 tablet 1     No current facility-administered medications on file prior to visit.      Objective   Anticoagulation Summary  As of 3/17/2025      INR goal:  2.0-3.0   TTR:  71.5% (1.3 y)   INR used for dosin.90 (3/17/2025)   Weekly  warfarin total:  45 mg             Lab Results   Component Value Date    INR 1.90 (A) 03/17/2025    INR 1.80 (A) 03/10/2025    INR 2.60 (N) 03/03/2025    INR 3.40 (A) 11/21/2024    INR 2.40 (N) 11/07/2024    PROTIME 21.3 (H) 01/21/2024    PROTIME 18.2 (H) 01/20/2024    PROTIME 16.2 (H) 01/19/2024       Assessment/Plan   Current INR is Subtherapeutic at 1.9. Previous INR was Subtherapeutic at 1.8. Continues to increase intake of Vitamin K containing foods. Advised patient to increase current warfarin regimen to 3.75mg on Thur/Sun and 7.5mg all other days.    Follow Up: 1 week    Nataliia Duggan, NolaD

## 2025-03-24 ENCOUNTER — APPOINTMENT (OUTPATIENT)
Dept: SLEEP MEDICINE | Facility: CLINIC | Age: 70
End: 2025-03-24
Payer: MEDICARE

## 2025-03-24 ENCOUNTER — ANTICOAGULATION - WARFARIN VISIT (OUTPATIENT)
Dept: PHARMACY | Facility: HOSPITAL | Age: 70
End: 2025-03-24

## 2025-03-24 VITALS
WEIGHT: 293 LBS | SYSTOLIC BLOOD PRESSURE: 126 MMHG | OXYGEN SATURATION: 92 % | BODY MASS INDEX: 45.99 KG/M2 | DIASTOLIC BLOOD PRESSURE: 76 MMHG | HEIGHT: 67 IN | HEART RATE: 102 BPM

## 2025-03-24 DIAGNOSIS — E66.01 MORBID OBESITY WITH BMI OF 45.0-49.9, ADULT (MULTI): ICD-10-CM

## 2025-03-24 DIAGNOSIS — I48.91 ATRIAL FIBRILLATION, UNSPECIFIED TYPE (MULTI): Primary | ICD-10-CM

## 2025-03-24 DIAGNOSIS — J96.11 CHRONIC RESPIRATORY FAILURE WITH HYPOXIA: ICD-10-CM

## 2025-03-24 DIAGNOSIS — G47.33 OSA (OBSTRUCTIVE SLEEP APNEA): Primary | ICD-10-CM

## 2025-03-24 PROCEDURE — 1160F RVW MEDS BY RX/DR IN RCRD: CPT | Performed by: PSYCHIATRY & NEUROLOGY

## 2025-03-24 PROCEDURE — 3078F DIAST BP <80 MM HG: CPT | Performed by: PSYCHIATRY & NEUROLOGY

## 2025-03-24 PROCEDURE — 1159F MED LIST DOCD IN RCRD: CPT | Performed by: PSYCHIATRY & NEUROLOGY

## 2025-03-24 PROCEDURE — G2211 COMPLEX E/M VISIT ADD ON: HCPCS | Performed by: PSYCHIATRY & NEUROLOGY

## 2025-03-24 PROCEDURE — 1036F TOBACCO NON-USER: CPT | Performed by: PSYCHIATRY & NEUROLOGY

## 2025-03-24 PROCEDURE — 3074F SYST BP LT 130 MM HG: CPT | Performed by: PSYCHIATRY & NEUROLOGY

## 2025-03-24 PROCEDURE — 1157F ADVNC CARE PLAN IN RCRD: CPT | Performed by: PSYCHIATRY & NEUROLOGY

## 2025-03-24 PROCEDURE — 3008F BODY MASS INDEX DOCD: CPT | Performed by: PSYCHIATRY & NEUROLOGY

## 2025-03-24 PROCEDURE — 99203 OFFICE O/P NEW LOW 30 MIN: CPT | Performed by: PSYCHIATRY & NEUROLOGY

## 2025-03-24 NOTE — PROGRESS NOTES
Subjective     Emma Villela is a 70 y.o. female who presents for anticoagulation follow up.     Location: [facility]     Referring Provider: Ti Nolan MD  INR Goal: 2.0-3.0  Indication: Atrial Fibrillation/Atrial Flutter    Current Outpatient Medications on File Prior to Visit   Medication Sig Dispense Refill    acetaminophen (Tylenol) 325 mg tablet Take 1 tablet (325 mg) by mouth every 4 hours if needed.      albuterol 90 mcg/actuation inhaler Inhale 2 puffs every 6 hours if needed.      amitriptyline (Elavil) 25 mg tablet TAKE ONE TABLET BY MOUTH AT BEDTIME 90 tablet 0    aspirin 81 mg EC tablet Take 1 tablet (81 mg) by mouth once daily. 90 tablet 3    atorvastatin (Lipitor) 40 mg tablet TAKE ONE TABLET BY MOUTH EVERY DAY 90 tablet 0    budesonide-formoteroL (Symbicort) 160-4.5 mcg/actuation inhaler Inhale 2 puffs 2 times a day. 10.2 g 11    carvedilol (Coreg) 25 mg tablet Take 0.5 tablets (12.5 mg) by mouth 2 times a day. 180 tablet 0    cholecalciferol (Vitamin D-3) 125 mcg (5000 UT) capsule TAKE ONE CAPSULE BY MOUTH DAILY 90 capsule 0    ferrous gluconate 324 (38 Fe) mg tablet Take 1 tablet (324 mg) by mouth once daily. 90 tablet 1    furosemide (Lasix) 40 mg tablet Take 1 tablet (40 mg) by mouth 2 times a day. 180 tablet 1    hydrALAZINE (Apresoline) 100 mg tablet TAKE ONE TABLET BY MOUTH THREE TIMES A  tablet 1    Jantoven 7.5 mg tablet TAKE ONE TABLET BY MOUTH EVERY DAY 90 tablet 0    levothyroxine (Synthroid, Levoxyl) 100 mcg tablet Take 1 tablet (100 mcg) by mouth early in the morning.. Take 100 mcg in addition to 300 mcg daily total 400 mcg daily on an empty stomach at the same time each day, either 30 to 60 minutes prior to breakfast 90 tablet 3    levothyroxine (Synthroid, Unithroid) 300 mcg tablet Take 1 tablet (300 mcg) by mouth early in the morning.. Take 300 mcg tablet in addition to 100 mcg tablet, total 400 mcg daily on an empty stomach at the  same time each day, either 30 to 60 minutes prior to breakfast 90 tablet 3    losartan (Cozaar) 100 mg tablet TAKE ONE TABLET BY MOUTH DAILY 90 tablet 1    magnesium oxide (Mag-Ox) 400 mg (241.3 mg magnesium) tablet Take 1 tablet (400 mg) by mouth 2 times a day. 180 tablet 0    meclizine (Antivert) 25 mg tablet TAKE ONE TABLET BY MOUTH AT BEDTIME (Patient taking differently: Take 1 tablet (25 mg) by mouth once daily at bedtime. PRN dizziness) 30 tablet 2    metOLazone (Zaroxolyn) 5 mg tablet Take 1 tablet (5 mg) by mouth. Twice a week      oxybutynin XL (Ditropan-XL) 15 mg 24 hr tablet Take 1 tablet (15 mg) by mouth once daily.      oxybutynin XL (Ditropan-XL) 5 mg 24 hr tablet TAKE ONE TABLET BY MOUTH EVERY EVENING IN ADDITION TO THE 15 MG XL TABS.      oxygen (O2) gas therapy Inhale 2-5 L/min continuously. 2-3 L/min during the day, 5 L/min with CPAP at night      pantoprazole (ProtoNix) 40 mg EC tablet TAKE ONE TABLET BY MOUTH DAILY 90 tablet 0    potassium chloride CR 20 mEq ER tablet Take 1 tablet (20 mEq) by mouth every 12 hours. 180 tablet 0    [DISCONTINUED] semaglutide, weight loss, (Wegovy) 0.25 mg/0.5 mL pen injector Inject 0.25 mg under the skin 1 (one) time per week for 4 doses. 2 mL 0     No current facility-administered medications on file prior to visit.      Objective   Anticoagulation Summary  As of 3/24/2025      INR goal:  2.0-3.0   TTR:  71.4% (1.3 y)   INR used for dosin.20 (3/24/2025)   Weekly warfarin total:  45 mg             Lab Results   Component Value Date    INR 2.20 (N) 2025    INR 1.90 (A) 2025    INR 1.80 (A) 03/10/2025    INR 3.40 (A) 2024    INR 2.40 (N) 2024    PROTIME 21.3 (H) 2024    PROTIME 18.2 (H) 2024    PROTIME 16.2 (H) 2024         Assessment/Plan   Current INR is Therapeutic at 2.2. Previous INR was Subtherapeutic at 1.9. As INR is therapeutic, no changes to warfarin regimen needed at this time. Plan to continue current  warfarin regimen to 3.75mg on Thur/Sun and 7.5mg all other days.    Follow Up: 1 week    Nola DeeD

## 2025-03-24 NOTE — Clinical Note
Hi Dr. Nolan, Unfortunately, pt cannot afford Wegovy, but possibly Zepbound would be cheaper if you'd like her to try it. I know it's approved for BECKI, but I'm not prescribing it at this time, mostly because as a neurologist as my primary specialty I don't feel comfortable at this time prescribing/managing meds for weight loss.
25

## 2025-03-24 NOTE — PROGRESS NOTES
Patient: Emma Villela    [id number]  : 1955 -- AGE 70 y.o.    Provider: Eron Ivey MD     [facility]   Service Date: 3/24/2025              [facility]  Follow-up Note          HPI: Emma Villela is a 70 y.o. female with severe BECKI and COPD with chronic respiratory failure, on CPAP and O2 at 5 L/min at night and 2-3 L/min during the day. She is here today for a follow up visit. She is new to me, but was last seen by pulmonology for COPD and BECKI management by Dr. Holland on 2025, at which time he prescribed her a new CPAP unit with O2 bleed in (her machine's motor had reached its life expectancy).    She is accompanied by her .    New CPAP humidifier is running out of water faster than her prior machine, and she has to refill it during the night. Sleeping with O2 at 5 L/min and at 2-3 L/min during the day. Getting a new O2 concentrator from the DME company.    Using CPAP every night. Sleep is disrupted by nocturia several times per night. Sleeps about 7 hours. Likes the air from CPAP to be cool.    Wegovy for weight loss was approved by her insurance, but was going to cost her $800/month, which she cannot afford.    PAP DEVICE   DME: [employer]  Setup date: 25  Type: CPAP  Finding benefit: yes - improved sleep quality and less daytime sleepiness    MASK  Type: full face   Fit: good  Patient is keeping the equipment clean and supplies are being renewed at appropriate intervals.     Prior Sleep studies:   -split night study, 10/7/2013: weight 136 kg, BMI 44.28. Severe BECKI with sleep-related hypoventilation/hypoxemia. Pre-CPAP  due to 11 obstructive apneas and 130 hypopneas, no REM sleep during diagnostic portion, CPAP titrated from 4-13 cm H2O. O2 at 3 L/min was bled into CPAP since O2 remained <90% in the absence of respiratory events. At 12 cm H2O, which was applied for 142.5 min the residual AHI was 0.9. No REM sleep during CPAP  titration portion. No PLMS.                 NIGHTTIME SYMPTOMS:   Ongoing Snoring: no  Pressure intolerance: no  Air hunger: no    DAYTIME SYMPTOMS:  Daytime sleepiness: no  Napping: no    REVIEW OF MACHINE DOWNLOAD:       Patient Active Problem List   Diagnosis    GERD (gastroesophageal reflux disease)    Hypothyroidism, postsurgical    A-fib (Multi)    Abdominal aortic aneurysm, without rupture, unspecified (CMS-HCC)    Acute bronchitis    Chronic obstructive pulmonary disease (Multi)    COPD with exacerbation (Multi)    Allergic rhinitis    Back arthralgia    Low back pain    Benign essential hypertension    Essential (primary) hypertension    BPV (benign positional vertigo)    CHF (NYHA class III, ACC/AHA stage C) (Multi)    Congestive heart failure    Chronic respiratory failure with hypoxia (Multi)    Class 2 obesity due to excess calories in adult    Morbid obesity (Multi)    Colloid thyroid nodule    Edema of leg    Swelling of lower extremity    Gastroduodenitis    Goiter    Herpes simplex type 1 infection    History of radiofrequency ablation procedure for cardiac arrhythmia    Hyperactivity of bladder    Hypercholesterolemia    Hypokalemia    Hypoxia    Insomnia    Lymphedema of both lower extremities    Obstructive sleep apnea    Other specified anemias    Peripheral neuropathy    Pleural effusion, left    Pneumonia    Post-operative pain    Sinusitis    Suspected COVID-19 virus infection    Urinary symptom or sign    Vitamin D deficiency    Hypomagnesemia    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)    Stage 3a chronic kidney disease (Multi)    Chronic kidney disease, stage 3b (Multi)     Past Medical History:   Diagnosis Date    Cellulitis, unspecified     Cellulitis    COPD (chronic obstructive pulmonary disease) (Multi)     Essential (primary) hypertension 05/21/2013    Malignant essential hypertension    Nontoxic goiter, unspecified     Goiter    Other conditions influencing health status      Pneumonia    Personal history of other diseases of the circulatory system 03/03/2015    History of atrial flutter    Personal history of other diseases of the nervous system and sense organs     History of peripheral neuropathy    Personal history of other diseases of the respiratory system 10/21/2014    History of acute sinusitis    Personal history of other medical treatment 01/25/2022    History of screening mammography    Personal history of other specified conditions 06/25/2013    History of urinary incontinence    Sleep apnea      Past Surgical History:   Procedure Laterality Date    BLADDER SURGERY  03/27/2013    Bladder Surgery    CT ABDOMEN PELVIS ANGIOGRAM W AND/OR WO IV CONTRAST  05/02/2016    CT ABDOMEN PELVIS ANGIOGRAM W AND/OR WO IV CONTRAST 5/2/2016 [facility]    CT ABDOMEN PELVIS ANGIOGRAM W AND/OR WO IV CONTRAST  06/23/2016    CT ABDOMEN PELVIS ANGIOGRAM W AND/OR WO IV CONTRAST 6/23/2016 [facility]    CT ABDOMEN PELVIS ANGIOGRAM W AND/OR WO IV CONTRAST  03/02/2018    CT ABDOMEN PELVIS ANGIOGRAM W AND/OR WO IV CONTRAST 3/2/2018 [facility]    CT ABDOMEN PELVIS ANGIOGRAM W AND/OR WO IV CONTRAST  02/22/2019    CT ABDOMEN PELVIS ANGIOGRAM W AND/OR WO IV CONTRAST 2/22/2019 [facility]    CT ABDOMEN PELVIS ANGIOGRAM W AND/OR WO IV CONTRAST  02/28/2020    CT ABDOMEN PELVIS ANGIOGRAM W AND/OR WO IV CONTRAST 2/28/2020 [facility]    CT ABDOMEN PELVIS ANGIOGRAM W AND/OR WO IV CONTRAST  08/28/2020    CT ABDOMEN PELVIS ANGIOGRAM W AND/OR WO IV CONTRAST 8/28/2020 [facility]    CT ABDOMEN PELVIS ANGIOGRAM W AND/OR WO IV CONTRAST  06/12/2017    CT ABDOMEN PELVIS ANGIOGRAM W AND/OR WO IV CONTRAST 6/12/2017 [facility]    CT ABDOMEN PELVIS ANGIOGRAM W AND/OR WO IV CONTRAST  11/29/2016    CT ABDOMEN PELVIS ANGIOGRAM W AND/OR WO IV CONTRAST 11/29/2016 [facility]    CT ABDOMEN PELVIS ANGIOGRAM W AND/OR WO IV CONTRAST  09/22/2022    CT ABDOMEN  PELVIS ANGIOGRAM W AND/OR WO IV CONTRAST 9/22/2022 [facility]    CT ABDOMEN PELVIS ANGIOGRAM W AND/OR WO IV CONTRAST  10/23/2023    CT ABDOMEN PELVIS ANGIOGRAM W AND/OR WO IV CONTRAST 10/23/2023 GEN CT    HYSTERECTOMY  1994    IR ANGIOGRAM AORTA ABDOMEN  05/12/2020    IR ANGIOGRAM AORTA ABDOMEN 5/12/2020 [facility]    KNEE ARTHROSCOPY W/ DEBRIDEMENT  05/14/2013    Knee Arthroscopy (Therapeutic)    OTHER SURGICAL HISTORY  10/24/2018    Endovascular Repair Infrarenal Abdominal Aorta Aneurysm    OTHER SURGICAL HISTORY  03/02/2015    Cardiac Catheter His Ablation    TOTAL THYROIDECTOMY  05/14/2013    Thyroid Surgery Total Thyroidectomy     Current Outpatient Medications on File Prior to Visit   Medication Sig Dispense Refill    acetaminophen (Tylenol) 325 mg tablet Take 1 tablet (325 mg) by mouth every 4 hours if needed.      albuterol 90 mcg/actuation inhaler Inhale 2 puffs every 6 hours if needed.      amitriptyline (Elavil) 25 mg tablet TAKE ONE TABLET BY MOUTH AT BEDTIME 90 tablet 0    aspirin 81 mg EC tablet Take 1 tablet (81 mg) by mouth once daily. 90 tablet 3    atorvastatin (Lipitor) 40 mg tablet TAKE ONE TABLET BY MOUTH EVERY DAY 90 tablet 0    budesonide-formoteroL (Symbicort) 160-4.5 mcg/actuation inhaler Inhale 2 puffs 2 times a day. 10.2 g 11    carvedilol (Coreg) 25 mg tablet Take 0.5 tablets (12.5 mg) by mouth 2 times a day. 180 tablet 0    cholecalciferol (Vitamin D-3) 125 mcg (5000 UT) capsule TAKE ONE CAPSULE BY MOUTH DAILY 90 capsule 0    ferrous gluconate 324 (38 Fe) mg tablet Take 1 tablet (324 mg) by mouth once daily. 90 tablet 1    furosemide (Lasix) 40 mg tablet Take 1 tablet (40 mg) by mouth 2 times a day. 180 tablet 1    hydrALAZINE (Apresoline) 100 mg tablet TAKE ONE TABLET BY MOUTH THREE TIMES A  tablet 1    Jantoven 7.5 mg tablet TAKE ONE TABLET BY MOUTH EVERY DAY 90 tablet 0    levothyroxine (Synthroid, Levoxyl) 100 mcg tablet Take 1 tablet (100 mcg) by mouth  "early in the morning.. Take 100 mcg in addition to 300 mcg daily total 400 mcg daily on an empty stomach at the same time each day, either 30 to 60 minutes prior to breakfast 90 tablet 3    levothyroxine (Synthroid, Unithroid) 300 mcg tablet Take 1 tablet (300 mcg) by mouth early in the morning.. Take 300 mcg tablet in addition to 100 mcg tablet, total 400 mcg daily on an empty stomach at the same time each day, either 30 to 60 minutes prior to breakfast 90 tablet 3    losartan (Cozaar) 100 mg tablet TAKE ONE TABLET BY MOUTH DAILY 90 tablet 1    magnesium oxide (Mag-Ox) 400 mg (241.3 mg magnesium) tablet Take 1 tablet (400 mg) by mouth 2 times a day. 180 tablet 0    meclizine (Antivert) 25 mg tablet TAKE ONE TABLET BY MOUTH AT BEDTIME (Patient taking differently: Take 1 tablet (25 mg) by mouth once daily at bedtime. PRN dizziness) 30 tablet 2    metOLazone (Zaroxolyn) 5 mg tablet Take 1 tablet (5 mg) by mouth. Twice a week      oxybutynin XL (Ditropan-XL) 15 mg 24 hr tablet Take 1 tablet (15 mg) by mouth once daily.      oxybutynin XL (Ditropan-XL) 5 mg 24 hr tablet TAKE ONE TABLET BY MOUTH EVERY EVENING IN ADDITION TO THE 15 MG XL TABS.      oxygen (O2) gas therapy Inhale 2-5 L/min continuously. 2-3 L/min during the day, 5 L/min with CPAP at night      pantoprazole (ProtoNix) 40 mg EC tablet TAKE ONE TABLET BY MOUTH DAILY 90 tablet 0    potassium chloride CR 20 mEq ER tablet Take 1 tablet (20 mEq) by mouth every 12 hours. 180 tablet 0    [DISCONTINUED] semaglutide, weight loss, (Wegovy) 0.25 mg/0.5 mL pen injector Inject 0.25 mg under the skin 1 (one) time per week for 4 doses. 2 mL 0     No current facility-administered medications on file prior to visit.       PHYSICAL EXAMINATION:   Vitals:    03/24/25 1051   BP: 126/76   BP Location: Other (Comment)   Pulse: 102   SpO2: 92%   Weight: 138 kg (305 lb)   Height: 1.702 m (5' 7\")     Body mass index is 47.77 kg/m².  General: Awake. Alert. Comfortable. No apparent " distress. Nasal cannula in place.  Speech: Normal.  Comprehension: Normal.  Mood: Stable.  Affect: Appropriate.  Pul:         Normal respiratory effort.   Abd:         Obese  Neuro: Alert, well-oriented. Cranial nerves II-XII grossly normal and symmetric.  Moves all limbs symmetrically with no evidence of significant focal weakness. No abnormal movements noted. Normal gait      ASSESSMENT AND PLAN: Ms. Emma Villela is a 70 y.o. female with severe BECKI and COPD with chronic respiratory failure, on CPAP and O2 at 5 L/min at night and 2-3 L/min during the day. She got her new CPAP unit and is doing well with it, but is bothered by it using up the water in the humidifier chamber too quickly. Her compliance is excellent and she derives benefit from therapy, but her residual AHI is sometimes mildly elevated, likely due to some weight gain over the years.      #BECKI  -remotely I adjusted her pressure from 12 cm H2O to autoCPAP 12-15 cm H2O  -for her comfort to hopefully help prevent her water chamber from using up water too quickly, I changed her climate setting from auto to manual with humidity set at 4 and tube temp at 68 degrees F. Pt and  know that they can change these climate settings for her comfort and they were advised that if condensation forms in her mask, they should turn up the tube temp or decrease the humidity    #chronic respiratory failure with hypoxia - on O2 24/7  -pt using O2 at 5 L/min with CPAP and 2-3 L/min during the daytime    #morbid obesity  -pt cannot afford Wegovy  -defer management to PCP. Zepbound may be cheaper and is FDA approved for BECKI and obesity      All of the above was discussed with the patient in detail. She voiced an understanding of the above and was agreeable to proceed further as advised.     36 minutes were spent with the patient plus time spent reviewing the chart, updating the chart as needed, and documenting.     FOLLOW UP: 6 months, sooner if needed

## 2025-03-31 ENCOUNTER — ANTICOAGULATION - WARFARIN VISIT (OUTPATIENT)
Dept: PHARMACY | Facility: HOSPITAL | Age: 70
End: 2025-03-31
Payer: MEDICARE

## 2025-03-31 DIAGNOSIS — I48.91 ATRIAL FIBRILLATION, UNSPECIFIED TYPE (MULTI): Primary | ICD-10-CM

## 2025-03-31 NOTE — PROGRESS NOTES
Subjective     Emma Villela is a 70 y.o. female who presents for anticoagulation follow up.     Location: [facility]     Referring Provider: Ti Nolan MD  INR Goal: 2.0-3.0  Indication: Atrial Fibrillation/Atrial Flutter    Current Outpatient Medications on File Prior to Visit   Medication Sig Dispense Refill    acetaminophen (Tylenol) 325 mg tablet Take 1 tablet (325 mg) by mouth every 4 hours if needed.      albuterol 90 mcg/actuation inhaler Inhale 2 puffs every 6 hours if needed.      amitriptyline (Elavil) 25 mg tablet TAKE ONE TABLET BY MOUTH AT BEDTIME 90 tablet 0    aspirin 81 mg EC tablet Take 1 tablet (81 mg) by mouth once daily. 90 tablet 3    atorvastatin (Lipitor) 40 mg tablet TAKE ONE TABLET BY MOUTH EVERY DAY 90 tablet 0    budesonide-formoteroL (Symbicort) 160-4.5 mcg/actuation inhaler Inhale 2 puffs 2 times a day. 10.2 g 11    carvedilol (Coreg) 25 mg tablet Take 0.5 tablets (12.5 mg) by mouth 2 times a day. 180 tablet 0    cholecalciferol (Vitamin D-3) 125 mcg (5000 UT) capsule TAKE ONE CAPSULE BY MOUTH DAILY 90 capsule 0    ferrous gluconate 324 (38 Fe) mg tablet Take 1 tablet (324 mg) by mouth once daily. 90 tablet 1    furosemide (Lasix) 40 mg tablet Take 1 tablet (40 mg) by mouth 2 times a day. 180 tablet 1    hydrALAZINE (Apresoline) 100 mg tablet TAKE ONE TABLET BY MOUTH THREE TIMES A  tablet 1    Jantoven 7.5 mg tablet TAKE ONE TABLET BY MOUTH EVERY DAY 90 tablet 0    levothyroxine (Synthroid, Levoxyl) 100 mcg tablet Take 1 tablet (100 mcg) by mouth early in the morning.. Take 100 mcg in addition to 300 mcg daily total 400 mcg daily on an empty stomach at the same time each day, either 30 to 60 minutes prior to breakfast 90 tablet 3    levothyroxine (Synthroid, Unithroid) 300 mcg tablet Take 1 tablet (300 mcg) by mouth early in the morning.. Take 300 mcg tablet in addition to 100 mcg tablet, total 400 mcg daily on an empty stomach at the  same time each day, either 30 to 60 minutes prior to breakfast 90 tablet 3    losartan (Cozaar) 100 mg tablet TAKE ONE TABLET BY MOUTH DAILY 90 tablet 1    magnesium oxide (Mag-Ox) 400 mg (241.3 mg magnesium) tablet Take 1 tablet (400 mg) by mouth 2 times a day. 180 tablet 0    meclizine (Antivert) 25 mg tablet TAKE ONE TABLET BY MOUTH AT BEDTIME (Patient taking differently: Take 1 tablet (25 mg) by mouth once daily at bedtime. PRN dizziness) 30 tablet 2    metOLazone (Zaroxolyn) 5 mg tablet Take 1 tablet (5 mg) by mouth. Twice a week      oxybutynin XL (Ditropan-XL) 15 mg 24 hr tablet Take 1 tablet (15 mg) by mouth once daily.      oxybutynin XL (Ditropan-XL) 5 mg 24 hr tablet TAKE ONE TABLET BY MOUTH EVERY EVENING IN ADDITION TO THE 15 MG XL TABS.      oxygen (O2) gas therapy Inhale 2-5 L/min continuously. 2-3 L/min during the day, 5 L/min with CPAP at night      pantoprazole (ProtoNix) 40 mg EC tablet TAKE ONE TABLET BY MOUTH DAILY 90 tablet 0    potassium chloride CR 20 mEq ER tablet Take 1 tablet (20 mEq) by mouth every 12 hours. 180 tablet 0     No current facility-administered medications on file prior to visit.      Objective   Anticoagulation Summary  As of 3/31/2025      INR goal:  2.0-3.0   TTR:  71.1% (1.4 y)   INR used for dosin.80 (3/31/2025)   Weekly warfarin total:  48.75 mg             Lab Results   Component Value Date    INR 1.80 (A) 2025    INR 2.20 (N) 2025    INR 1.90 (A) 2025    INR 3.40 (A) 2024    INR 2.40 (N) 2024    PROTIME 21.3 (H) 2024    PROTIME 18.2 (H) 2024    PROTIME 16.2 (H) 2024           Assessment/Plan   Current INR is Subtherapeutic at 1.8. Previous INR was Therapeutic at 2.2. Upset stomach last week, less of an appetite than before. Plans to increase vitamin K intake. As INR is subtherapeutic, advised patient to increase weekly dose. Plan to increase warfarin regimen to 3.75mg on Sundays and 7.5mg all other days.    Follow  Up: 1 week    Katja Chisholm, PharmD

## 2025-04-07 ENCOUNTER — ANTICOAGULATION - WARFARIN VISIT (OUTPATIENT)
Dept: PHARMACY | Facility: HOSPITAL | Age: 70
End: 2025-04-07
Payer: MEDICARE

## 2025-04-07 DIAGNOSIS — I48.91 ATRIAL FIBRILLATION, UNSPECIFIED TYPE (MULTI): Primary | ICD-10-CM

## 2025-04-07 NOTE — PROGRESS NOTES
Subjective     Emma Villela is a 70 y.o. female who presents for anticoagulation follow up.     Location: [facility]     Referring Provider: Ti Nolan MD  INR Goal: 2.0-3.0  Indication: Atrial Fibrillation/Atrial Flutter    Current Outpatient Medications on File Prior to Visit   Medication Sig Dispense Refill    acetaminophen (Tylenol) 325 mg tablet Take 1 tablet (325 mg) by mouth every 4 hours if needed.      albuterol 90 mcg/actuation inhaler Inhale 2 puffs every 6 hours if needed.      amitriptyline (Elavil) 25 mg tablet TAKE ONE TABLET BY MOUTH AT BEDTIME 90 tablet 0    aspirin 81 mg EC tablet Take 1 tablet (81 mg) by mouth once daily. 90 tablet 3    atorvastatin (Lipitor) 40 mg tablet TAKE ONE TABLET BY MOUTH EVERY DAY 90 tablet 0    budesonide-formoteroL (Symbicort) 160-4.5 mcg/actuation inhaler Inhale 2 puffs 2 times a day. 10.2 g 11    carvedilol (Coreg) 25 mg tablet Take 0.5 tablets (12.5 mg) by mouth 2 times a day. 180 tablet 0    cholecalciferol (Vitamin D-3) 125 mcg (5000 UT) capsule TAKE ONE CAPSULE BY MOUTH DAILY 90 capsule 0    ferrous gluconate 324 (38 Fe) mg tablet Take 1 tablet (324 mg) by mouth once daily. 90 tablet 1    furosemide (Lasix) 40 mg tablet Take 1 tablet (40 mg) by mouth 2 times a day. 180 tablet 1    hydrALAZINE (Apresoline) 100 mg tablet TAKE ONE TABLET BY MOUTH THREE TIMES A  tablet 1    Jantoven 7.5 mg tablet TAKE ONE TABLET BY MOUTH EVERY DAY 90 tablet 0    levothyroxine (Synthroid, Levoxyl) 100 mcg tablet Take 1 tablet (100 mcg) by mouth early in the morning.. Take 100 mcg in addition to 300 mcg daily total 400 mcg daily on an empty stomach at the same time each day, either 30 to 60 minutes prior to breakfast 90 tablet 3    levothyroxine (Synthroid, Unithroid) 300 mcg tablet Take 1 tablet (300 mcg) by mouth early in the morning.. Take 300 mcg tablet in addition to 100 mcg tablet, total 400 mcg daily on an empty stomach at the  same time each day, either 30 to 60 minutes prior to breakfast 90 tablet 3    losartan (Cozaar) 100 mg tablet TAKE ONE TABLET BY MOUTH DAILY 90 tablet 1    magnesium oxide (Mag-Ox) 400 mg (241.3 mg magnesium) tablet Take 1 tablet (400 mg) by mouth 2 times a day. 180 tablet 0    meclizine (Antivert) 25 mg tablet TAKE ONE TABLET BY MOUTH AT BEDTIME (Patient taking differently: Take 1 tablet (25 mg) by mouth once daily at bedtime. PRN dizziness) 30 tablet 2    metOLazone (Zaroxolyn) 5 mg tablet Take 1 tablet (5 mg) by mouth. Twice a week      oxybutynin XL (Ditropan-XL) 15 mg 24 hr tablet Take 1 tablet (15 mg) by mouth once daily.      oxybutynin XL (Ditropan-XL) 5 mg 24 hr tablet TAKE ONE TABLET BY MOUTH EVERY EVENING IN ADDITION TO THE 15 MG XL TABS.      oxygen (O2) gas therapy Inhale 2-5 L/min continuously. 2-3 L/min during the day, 5 L/min with CPAP at night      pantoprazole (ProtoNix) 40 mg EC tablet TAKE ONE TABLET BY MOUTH DAILY 90 tablet 0    potassium chloride CR 20 mEq ER tablet Take 1 tablet (20 mEq) by mouth every 12 hours. 180 tablet 0     No current facility-administered medications on file prior to visit.      Objective   Anticoagulation Summary  As of 2025      INR goal:  2.0-3.0   TTR:  71.1% (1.4 y)   INR used for dosin.50 (2025)   Weekly warfarin total:  48.75 mg             Lab Results   Component Value Date    INR 2.50 (N) 2025    INR 1.80 (A) 2025    INR 2.20 (N) 2025    INR 3.40 (A) 2024    INR 2.40 (N) 2024    PROTIME 21.3 (H) 2024    PROTIME 18.2 (H) 2024    PROTIME 16.2 (H) 2024           Assessment/Plan   Current INR is Therapeutic at 2.5. Previous INR was Subtherapeutic at 1.8. Weekly dose increased at last visit due to subtherapeutic INR. As INR is now therapeutic, advised patient to continue warfarin regimen of 3.75mg on Sundays and 7.5mg all other days.    Follow Up: 1 week    Katja Chisholm, PharmD

## 2025-04-12 DIAGNOSIS — K21.9 GASTROESOPHAGEAL REFLUX DISEASE, UNSPECIFIED WHETHER ESOPHAGITIS PRESENT: ICD-10-CM

## 2025-04-12 DIAGNOSIS — I48.11 LONGSTANDING PERSISTENT ATRIAL FIBRILLATION (MULTI): ICD-10-CM

## 2025-04-14 ENCOUNTER — ANTICOAGULATION - WARFARIN VISIT (OUTPATIENT)
Dept: PHARMACY | Facility: HOSPITAL | Age: 70
End: 2025-04-14
Payer: MEDICARE

## 2025-04-14 DIAGNOSIS — I48.91 ATRIAL FIBRILLATION, UNSPECIFIED TYPE (MULTI): Primary | ICD-10-CM

## 2025-04-14 RX ORDER — WARFARIN SODIUM 7.5 MG/1
7.5 TABLET ORAL
Qty: 90 TABLET | Refills: 0 | Status: SHIPPED | OUTPATIENT
Start: 2025-04-14

## 2025-04-14 RX ORDER — PANTOPRAZOLE SODIUM 40 MG/1
40 TABLET, DELAYED RELEASE ORAL DAILY
Qty: 90 TABLET | Refills: 0 | Status: SHIPPED | OUTPATIENT
Start: 2025-04-14

## 2025-04-14 NOTE — PROGRESS NOTES
Subjective     Emma Villela is a 70 y.o. female who presents for anticoagulation follow up.     Location: [facility]     Referring Provider: Ti Nolan MD  INR Goal: 2.0-3.0  Indication: Atrial Fibrillation/Atrial Flutter    Current Outpatient Medications on File Prior to Visit   Medication Sig Dispense Refill    acetaminophen (Tylenol) 325 mg tablet Take 1 tablet (325 mg) by mouth every 4 hours if needed.      albuterol 90 mcg/actuation inhaler Inhale 2 puffs every 6 hours if needed.      amitriptyline (Elavil) 25 mg tablet TAKE ONE TABLET BY MOUTH AT BEDTIME 90 tablet 0    aspirin 81 mg EC tablet Take 1 tablet (81 mg) by mouth once daily. 90 tablet 3    atorvastatin (Lipitor) 40 mg tablet TAKE ONE TABLET BY MOUTH EVERY DAY 90 tablet 0    budesonide-formoteroL (Symbicort) 160-4.5 mcg/actuation inhaler Inhale 2 puffs 2 times a day. 10.2 g 11    carvedilol (Coreg) 25 mg tablet Take 0.5 tablets (12.5 mg) by mouth 2 times a day. 180 tablet 0    cholecalciferol (Vitamin D-3) 125 mcg (5000 UT) capsule TAKE ONE CAPSULE BY MOUTH DAILY 90 capsule 0    ferrous gluconate 324 (38 Fe) mg tablet Take 1 tablet (324 mg) by mouth once daily. 90 tablet 1    furosemide (Lasix) 40 mg tablet Take 1 tablet (40 mg) by mouth 2 times a day. 180 tablet 1    hydrALAZINE (Apresoline) 100 mg tablet TAKE ONE TABLET BY MOUTH THREE TIMES A  tablet 1    Jantoven 7.5 mg tablet TAKE ONE TABLET BY MOUTH DAILY 90 tablet 0    levothyroxine (Synthroid, Levoxyl) 100 mcg tablet Take 1 tablet (100 mcg) by mouth early in the morning.. Take 100 mcg in addition to 300 mcg daily total 400 mcg daily on an empty stomach at the same time each day, either 30 to 60 minutes prior to breakfast 90 tablet 3    levothyroxine (Synthroid, Unithroid) 300 mcg tablet Take 1 tablet (300 mcg) by mouth early in the morning.. Take 300 mcg tablet in addition to 100 mcg tablet, total 400 mcg daily on an empty stomach at the same  time each day, either 30 to 60 minutes prior to breakfast 90 tablet 3    losartan (Cozaar) 100 mg tablet TAKE ONE TABLET BY MOUTH DAILY 90 tablet 1    magnesium oxide (Mag-Ox) 400 mg (241.3 mg magnesium) tablet Take 1 tablet (400 mg) by mouth 2 times a day. 180 tablet 0    meclizine (Antivert) 25 mg tablet TAKE ONE TABLET BY MOUTH AT BEDTIME (Patient taking differently: Take 1 tablet (25 mg) by mouth once daily at bedtime. PRN dizziness) 30 tablet 2    metOLazone (Zaroxolyn) 5 mg tablet Take 1 tablet (5 mg) by mouth. Twice a week      oxybutynin XL (Ditropan-XL) 15 mg 24 hr tablet Take 1 tablet (15 mg) by mouth once daily.      oxybutynin XL (Ditropan-XL) 5 mg 24 hr tablet TAKE ONE TABLET BY MOUTH EVERY EVENING IN ADDITION TO THE 15 MG XL TABS.      oxygen (O2) gas therapy Inhale 2-5 L/min continuously. 2-3 L/min during the day, 5 L/min with CPAP at night      pantoprazole (ProtoNix) 40 mg EC tablet TAKE ONE TABLET BY MOUTH EVERY DAY 90 tablet 0    potassium chloride CR 20 mEq ER tablet Take 1 tablet (20 mEq) by mouth every 12 hours. 180 tablet 0    [DISCONTINUED] Jantoven 7.5 mg tablet TAKE ONE TABLET BY MOUTH EVERY DAY 90 tablet 0    [DISCONTINUED] pantoprazole (ProtoNix) 40 mg EC tablet TAKE ONE TABLET BY MOUTH DAILY 90 tablet 0     No current facility-administered medications on file prior to visit.      Objective   Anticoagulation Summary  As of 2025      INR goal:  2.0-3.0   TTR:  71.5% (1.4 y)   INR used for dosin.30 (2025)   Weekly warfarin total:  48.75 mg             Lab Results   Component Value Date    INR 2.30 (N) 2025    INR 2.50 (N) 2025    INR 1.80 (A) 2025    INR 3.40 (A) 2024    INR 2.40 (N) 2024    PROTIME 21.3 (H) 2024    PROTIME 18.2 (H) 2024    PROTIME 16.2 (H) 2024         Assessment/Plan   Current INR is Therapeutic at 2.3. Previous INR was Therapeutic at 2.5. As INR is therapeutic, no changes to warfarin regimen needed at this  time. Advised patient to continue warfarin regimen of 3.75mg on Sundays and 7.5mg all other days.    Follow Up: 1 week    Katja Chisholm, PharmD

## 2025-04-21 ENCOUNTER — ANTICOAGULATION - WARFARIN VISIT (OUTPATIENT)
Dept: PHARMACY | Facility: HOSPITAL | Age: 70
End: 2025-04-21
Payer: MEDICARE

## 2025-04-21 DIAGNOSIS — I48.91 ATRIAL FIBRILLATION, UNSPECIFIED TYPE (MULTI): Primary | ICD-10-CM

## 2025-04-21 NOTE — PROGRESS NOTES
Subjective     Emma Villela is a 70 y.o. female who presents for anticoagulation follow up.     Location: [facility]     Referring Provider: Ti Nolan MD  INR Goal: 2.0-3.0  Indication: Atrial Fibrillation/Atrial Flutter    Current Outpatient Medications on File Prior to Visit   Medication Sig Dispense Refill    acetaminophen (Tylenol) 325 mg tablet Take 1 tablet (325 mg) by mouth every 4 hours if needed.      albuterol 90 mcg/actuation inhaler Inhale 2 puffs every 6 hours if needed.      amitriptyline (Elavil) 25 mg tablet TAKE ONE TABLET BY MOUTH AT BEDTIME 90 tablet 0    aspirin 81 mg EC tablet Take 1 tablet (81 mg) by mouth once daily. 90 tablet 3    atorvastatin (Lipitor) 40 mg tablet TAKE ONE TABLET BY MOUTH EVERY DAY 90 tablet 0    budesonide-formoteroL (Symbicort) 160-4.5 mcg/actuation inhaler Inhale 2 puffs 2 times a day. 10.2 g 11    carvedilol (Coreg) 25 mg tablet Take 0.5 tablets (12.5 mg) by mouth 2 times a day. 180 tablet 0    cholecalciferol (Vitamin D-3) 125 mcg (5000 UT) capsule TAKE ONE CAPSULE BY MOUTH DAILY 90 capsule 0    ferrous gluconate 324 (38 Fe) mg tablet Take 1 tablet (324 mg) by mouth once daily. 90 tablet 1    furosemide (Lasix) 40 mg tablet Take 1 tablet (40 mg) by mouth 2 times a day. 180 tablet 1    hydrALAZINE (Apresoline) 100 mg tablet TAKE ONE TABLET BY MOUTH THREE TIMES A  tablet 1    Jantoven 7.5 mg tablet TAKE ONE TABLET BY MOUTH DAILY 90 tablet 0    levothyroxine (Synthroid, Levoxyl) 100 mcg tablet Take 1 tablet (100 mcg) by mouth early in the morning.. Take 100 mcg in addition to 300 mcg daily total 400 mcg daily on an empty stomach at the same time each day, either 30 to 60 minutes prior to breakfast 90 tablet 3    levothyroxine (Synthroid, Unithroid) 300 mcg tablet Take 1 tablet (300 mcg) by mouth early in the morning.. Take 300 mcg tablet in addition to 100 mcg tablet, total 400 mcg daily on an empty stomach at the same  time each day, either 30 to 60 minutes prior to breakfast 90 tablet 3    losartan (Cozaar) 100 mg tablet TAKE ONE TABLET BY MOUTH DAILY 90 tablet 1    magnesium oxide (Mag-Ox) 400 mg (241.3 mg magnesium) tablet Take 1 tablet (400 mg) by mouth 2 times a day. 180 tablet 0    meclizine (Antivert) 25 mg tablet TAKE ONE TABLET BY MOUTH AT BEDTIME (Patient taking differently: Take 1 tablet (25 mg) by mouth once daily at bedtime. PRN dizziness) 30 tablet 2    metOLazone (Zaroxolyn) 5 mg tablet Take 1 tablet (5 mg) by mouth. Twice a week      oxybutynin XL (Ditropan-XL) 15 mg 24 hr tablet Take 1 tablet (15 mg) by mouth once daily.      oxybutynin XL (Ditropan-XL) 5 mg 24 hr tablet TAKE ONE TABLET BY MOUTH EVERY EVENING IN ADDITION TO THE 15 MG XL TABS.      oxygen (O2) gas therapy Inhale 2-5 L/min continuously. 2-3 L/min during the day, 5 L/min with CPAP at night      pantoprazole (ProtoNix) 40 mg EC tablet TAKE ONE TABLET BY MOUTH EVERY DAY 90 tablet 0    potassium chloride CR 20 mEq ER tablet Take 1 tablet (20 mEq) by mouth every 12 hours. 180 tablet 0     No current facility-administered medications on file prior to visit.      Objective   Anticoagulation Summary  As of 2025      INR goal:  2.0-3.0   TTR:  71.9% (1.4 y)   INR used for dosin.70 (2025)   Weekly warfarin total:  48.75 mg             Lab Results   Component Value Date    INR 2.70 (N) 2025    INR 2.30 (N) 2025    INR 2.50 (N) 2025    INR 3.40 (A) 2024    INR 2.40 (N) 2024    PROTIME 21.3 (H) 2024    PROTIME 18.2 (H) 2024    PROTIME 16.2 (H) 2024         Assessment/Plan   Current INR is Therapeutic at 2.7. Previous INR was Therapeutic at 2.3. As INR is therapeutic, no changes to warfarin regimen needed at this time. Advised patient to continue warfarin regimen of 3.75mg on Sundays and 7.5mg all other days.    Follow Up: 1 week    Katja Chisholm, PharmD

## 2025-04-25 ENCOUNTER — APPOINTMENT (OUTPATIENT)
Dept: ENDOCRINOLOGY | Facility: CLINIC | Age: 70
End: 2025-04-25
Payer: MEDICARE

## 2025-04-25 VITALS
HEART RATE: 75 BPM | HEIGHT: 67 IN | DIASTOLIC BLOOD PRESSURE: 89 MMHG | BODY MASS INDEX: 45.99 KG/M2 | WEIGHT: 293 LBS | SYSTOLIC BLOOD PRESSURE: 115 MMHG

## 2025-04-25 DIAGNOSIS — E03.9 HYPOTHYROIDISM, UNSPECIFIED TYPE: Primary | ICD-10-CM

## 2025-04-25 DIAGNOSIS — E01.0 THYROMEGALY: ICD-10-CM

## 2025-04-25 DIAGNOSIS — K29.90 GASTRODUODENITIS: ICD-10-CM

## 2025-04-25 DIAGNOSIS — E83.42 HYPOMAGNESEMIA: ICD-10-CM

## 2025-04-25 PROCEDURE — 3079F DIAST BP 80-89 MM HG: CPT | Performed by: INTERNAL MEDICINE

## 2025-04-25 PROCEDURE — 99204 OFFICE O/P NEW MOD 45 MIN: CPT | Performed by: INTERNAL MEDICINE

## 2025-04-25 PROCEDURE — 1157F ADVNC CARE PLAN IN RCRD: CPT | Performed by: INTERNAL MEDICINE

## 2025-04-25 PROCEDURE — 3008F BODY MASS INDEX DOCD: CPT | Performed by: INTERNAL MEDICINE

## 2025-04-25 PROCEDURE — 3074F SYST BP LT 130 MM HG: CPT | Performed by: INTERNAL MEDICINE

## 2025-04-25 NOTE — PROGRESS NOTES
History Of Present Illness  Emma Villela is a 70 y.o. female  has a past medical history of ,COPD, Essential (primary) hypertension, Nontoxic goiter s/p attempted thyroidectomy (1989) unsuccesful due to excessive vascularity, hx of ODONNELL x ? 4-6 times, Sleep apnea presenting as a new patient to endocrinology clinic for management of thyroid hormone related issues.     Patient endorses enlarging thyroid and difficulty swallowing food.  She reports that she has to chew her food mindfully to avoid difficulty swallowing.  She denies choking episodes.    Patient follows with Dr. Booker (endocrinology)    Current endocrine regimen:  Tablet LT4 400 mcg alternating every other day with 300 mcg.  She reports being on current dose since approximately 1 to 2 months.    Review of Systems   Reports stable energy level, denies fatigue.  She reports disturbed sleep because of incontinence issue.  Reports that she has to get up multiple times at night to use the bathroom.  Patient reports good appetite, and has lost approximately 25 pounds in last 4 months through diet control.  She reports hoarseness of voice.  Patient has shortness of breath in setting of COPD and uses 2 L of oxygen at baseline and 5 L at night.  Palpitations: denies  Tremor: Denies  Constipation/Diarrhea: no  Hot flashes: denies  Heat intolerance : yes  Night sweats: no   Muscle cramps: no  Biotin: no  Menstrual history: post  menopausal, hystrectomy in 1996 for menorrrhagia    Past Medical History  She has a past medical history of Cellulitis, unspecified, COPD (chronic obstructive pulmonary disease) (Multi), Essential (primary) hypertension (05/21/2013), Nontoxic goiter, unspecified, Other conditions influencing health status, Personal history of other diseases of the circulatory system (03/03/2015), Personal history of other diseases of the nervous system and sense organs, Personal history of other diseases of the respiratory system (10/21/2014), Personal  history of other medical treatment (01/25/2022), Personal history of other specified conditions (06/25/2013), and Sleep apnea.    Surgical History  She has a past surgical history that includes Bladder surgery (03/27/2013); Other surgical history (10/24/2018); Other surgical history (03/02/2015); Total thyroidectomy (05/14/2013); Knee arthroscopy w/ debridement (05/14/2013); CT angio abdomen pelvis w and or wo IV IV contrast (05/02/2016); CT angio abdomen pelvis w and or wo IV IV contrast (06/23/2016); CT angio abdomen pelvis w and or wo IV IV contrast (03/02/2018); CT angio abdomen pelvis w and or wo IV IV contrast (02/22/2019); CT angio abdomen pelvis w and or wo IV IV contrast (02/28/2020); CT angio abdomen pelvis w and or wo IV IV contrast (08/28/2020); CT angio abdomen pelvis w and or wo IV IV contrast (06/12/2017); CT angio abdomen pelvis w and or wo IV IV contrast (11/29/2016); IR angiogram aorta abdomen (05/12/2020); CT angio abdomen pelvis w and or wo IV IV contrast (09/22/2022); CT angio abdomen pelvis w and or wo IV IV contrast (10/23/2023); and Hysterectomy (1994).     Social History  She reports that she quit smoking about 6 years ago. Her smoking use included cigarettes. She has never used smokeless tobacco. She reports that she does not drink alcohol and does not use drugs.    Family History  Family History[1]    Current Outpatient Medications   Medication Instructions    acetaminophen (TYLENOL) 325 mg, Every 4 hours PRN    albuterol 90 mcg/actuation inhaler 2 puffs, Every 6 hours PRN    amitriptyline (ELAVIL) 25 mg, oral, Nightly    aspirin 81 mg, oral, Daily    atorvastatin (LIPITOR) 40 mg, oral, Daily    budesonide-formoteroL (Symbicort) 160-4.5 mcg/actuation inhaler 2 puffs, inhalation, 2 times daily    carvedilol (COREG) 12.5 mg, oral, 2 times daily    cholecalciferol (VITAMIN D-3) 125 mcg, oral, Daily    ferrous gluconate (FERGON) 324 mg, oral, Daily    furosemide (LASIX) 40 mg, oral, 2 times  daily    hydrALAZINE (APRESOLINE) 100 mg, oral, 3 times daily    Jantoven 7.5 mg, oral    levothyroxine (SYNTHROID, LEVOXYL) 100 mcg, oral, Daily, Take 100 mcg in addition to 300 mcg daily total 400 mcg daily on an empty stomach at the same time each day, either 30 to 60 minutes prior to breakfast    levothyroxine (SYNTHROID, UNITHROID) 300 mcg, oral, Daily, Take 300 mcg tablet in addition to 100 mcg tablet, total 400 mcg daily on an empty stomach at the same time each day, either 30 to 60 minutes prior to breakfast    losartan (COZAAR) 100 mg, oral, Daily    magnesium oxide (MAG-OX) 400 mg, oral, 2 times daily    meclizine (ANTIVERT) 25 mg, oral, Nightly    metOLazone (ZAROXOLYN) 5 mg    oxybutynin XL (Ditropan-XL) 5 mg 24 hr tablet TAKE ONE TABLET BY MOUTH EVERY EVENING IN ADDITION TO THE 15 MG XL TABS.    oxybutynin XL (DITROPAN-XL) 15 mg, Daily    oxygen (O2) 2-5 L/min, Continuous    pantoprazole (PROTONIX) 40 mg, oral, Daily    potassium chloride CR 20 mEq ER tablet 20 mEq, oral, Every 12 hours      Vitals  Visit Vitals  /89   Pulse 75        Physical Exam  Constitutional: NAD, well groomed. AOx3. Cooperative, sitting in wheelchair  Skin/Hair: Warm, dry skin.  HEENT: EOMI, Anicteric scleras. Dry oral mucosa. Chvostek sign negative  Neck: Soft, supple.  No lymphadenopathy.  Thyroid gland enlarged approximately 30 g, non nodular, rubbery consistency, non tender, notes improvement in breathing on lifting hands above her head  Cardiovascular: RRR, no rub or murmurs.  Respiratory: CTAB, no accessory muscle use.  Abdomen: Soft, nontender to palpation.  Extremities: Preserved peripheral pulses, No peripheral edema.  Neuro: Moving all extremities spontaneously. CN's grossly intact. No balance or gait disturbances. DTRs: no delay in relaxation phase.     Latest Reference Range & Units Most Recent   Creatinine 0.50 - 1.05 mg/dL 1.30 (H)  8/6/24 08:55   EGFR >60 mL/min/1.73m*2 44 (L)  8/6/24 08:55   Calcium 8.6 -  10.3 mg/dL 10.5 (H)  8/6/24 08:55   PHOSPHORUS 2.5 - 4.9 mg/dL 3.2  1/21/24 06:15   Alkaline Phosphatase 33 - 136 U/L 81  6/12/24 11:53   Hemoglobin A1C see below % 5.5  12/28/23 10:43   CoCa: 10.7     Latest Reference Range & Units Most Recent   Albumin 3.4 - 5.0 g/dL 3.7  6/12/24 11:53      Latest Reference Range & Units Most Recent   Thyroxine, Free 0.61 - 1.12 ng/dL 2.48 (H)  1/16/25 11:34   Thyroid Stimulating Hormone 0.44 - 3.98 mIU/L 0.01 (L)  1/16/25 11:34      Latest Reference Range & Units Most Recent   Vitamin D, 25-Hydroxy, Total 30 - 100 ng/mL 72  6/12/24 11:53     US THYROID;  6/20/2024  FINDINGS:  PARENCHYMA:  Diffusely heterogenous parenchmya with decreased echogenicity. Increased vascularity on color Doppler.      SIZE:  RIGHT LOBE: 8.5 x 4.4 x 7.9 cm.  LEFT LOBE: Not visualized.  ISTHMUS: Not visualized.      NODULES: (Please note, assessment and description of nodules is per TI-RADS criteria. Up to 4 total nodules described, which includes largest and/or most clinically significant based on morphology.) It is noted that some spongiform and/or cystic nodules may not be specifically described and are TR category 1 (benign).      No distinct fibroids detected.      CERVICAL LYMPH NODES:  No enlarged or morphologically abnormal cervical nodes are seen.      IMPRESSION:  1. Left thyroid not visualized.  2. Enlarged, hypervascular right thyroid lobe which may indicate thyroiditis.  3. No discrete nodules are detected.        Assessment/Plan   Emma Villela is a 70 y.o. female  has a past medical history of ,COPD, Essential (primary) hypertension, Nontoxic goiter s/p attempted thyroidectomy (1989) unsuccesful due to excessive vascularity per patient (no records in EMR), hx of ODONNELL x ? 4-6 times, Sleep apnea, Afib presenting as a new patient to endocrinology clinic for management of thyroid hormone related issues.    She is on high doses of levothyroxine.  Currently she is taking 400 mcg alternating with  300 mcg every other day.  TSH 0.01, free T4 : 2.48 (1/2025)    Recommendations:  Her difficulty swallowing is most likely related to decreased saliva production in setting of history of multiple radioactive iodine ablation.  Patient advised to stay hydrated and take sips of water while eating  Decrease dose of levothyroxine to 250 mcg daily.  Patient has 300 mcg and 100 mcg tablets at home.  She was advised to take 1/2 of 300 mcg pill with 100 mcg tablet to make dose of 250 mcg daily.  Patient was advised to take levothyroxine empty stomach and wait for 30 to 40 minutes before taking other medications/food.  Plan to repeat TFTs in 6 weeks and decrease dose of levothyroxine accordingly to avoid side effects from higher doses  Will follow results and call with recommendations.      The patient was seen and discussed with attending Dr. Burnett.    Dominik Garcia MD  Endocrinology fellow              [1]   Family History  Problem Relation Name Age of Onset    Stroke Father      Heart attack Sister      Breast cancer Maternal Grandmother

## 2025-04-25 NOTE — PROGRESS NOTES
I saw and evaluated the patient. I personally obtained the key and critical portions of the history and physical exam or was physically present for key and critical portions performed by the resident/fellow. I reviewed the resident/fellow's documentation and discussed the patient with the resident/fellow. I agree with the resident/fellow's medical decision making as documented in the note.    Agustin Burnett MD

## 2025-04-28 ENCOUNTER — ANTICOAGULATION - WARFARIN VISIT (OUTPATIENT)
Dept: PHARMACY | Facility: HOSPITAL | Age: 70
End: 2025-04-28
Payer: MEDICARE

## 2025-04-28 DIAGNOSIS — I48.91 ATRIAL FIBRILLATION, UNSPECIFIED TYPE (MULTI): Primary | ICD-10-CM

## 2025-04-28 LAB
INR IN PPP BY COAGULATION ASSAY EXTERNAL: 3.7 (ref 2–3)
PROTHROMBIN TIME (PT) IN PPP BY COAGULATION ASSAY EXTERNAL: ABNORMAL

## 2025-04-28 RX ORDER — AMITRIPTYLINE HYDROCHLORIDE 25 MG/1
25 TABLET, FILM COATED ORAL NIGHTLY
Qty: 90 TABLET | Refills: 0 | Status: SHIPPED | OUTPATIENT
Start: 2025-04-28

## 2025-04-28 RX ORDER — LANOLIN ALCOHOL/MO/W.PET/CERES
1 CREAM (GRAM) TOPICAL 2 TIMES DAILY
Qty: 180 TABLET | Refills: 0 | Status: SHIPPED | OUTPATIENT
Start: 2025-04-28

## 2025-04-28 NOTE — PROGRESS NOTES
Subjective     Emma Villela is a 70 y.o. female who presents for anticoagulation follow up.     Location: [facility]     Referring Provider: Ti Nolan MD  INR Goal: 2.0-3.0  Indication: Atrial Fibrillation/Atrial Flutter    Current Outpatient Medications on File Prior to Visit   Medication Sig Dispense Refill    acetaminophen (Tylenol) 325 mg tablet Take 1 tablet (325 mg) by mouth every 4 hours if needed.      albuterol 90 mcg/actuation inhaler Inhale 2 puffs every 6 hours if needed.      amitriptyline (Elavil) 25 mg tablet TAKE ONE TABLET BY MOUTH AT BEDTIME 90 tablet 0    aspirin 81 mg EC tablet Take 1 tablet (81 mg) by mouth once daily. 90 tablet 3    atorvastatin (Lipitor) 40 mg tablet TAKE ONE TABLET BY MOUTH EVERY DAY 90 tablet 0    budesonide-formoteroL (Symbicort) 160-4.5 mcg/actuation inhaler Inhale 2 puffs 2 times a day. 10.2 g 11    carvedilol (Coreg) 25 mg tablet Take 0.5 tablets (12.5 mg) by mouth 2 times a day. 180 tablet 0    cholecalciferol (Vitamin D-3) 125 mcg (5000 UT) capsule TAKE ONE CAPSULE BY MOUTH DAILY 90 capsule 0    ferrous gluconate 324 (38 Fe) mg tablet Take 1 tablet (324 mg) by mouth once daily. 90 tablet 1    furosemide (Lasix) 40 mg tablet Take 1 tablet (40 mg) by mouth 2 times a day. 180 tablet 1    hydrALAZINE (Apresoline) 100 mg tablet TAKE ONE TABLET BY MOUTH THREE TIMES A  tablet 1    Jantoven 7.5 mg tablet TAKE ONE TABLET BY MOUTH DAILY 90 tablet 0    levothyroxine (Synthroid, Levoxyl) 100 mcg tablet Take 1 tablet (100 mcg) by mouth early in the morning.. Take 100 mcg in addition to 300 mcg daily total 400 mcg daily on an empty stomach at the same time each day, either 30 to 60 minutes prior to breakfast 90 tablet 3    levothyroxine (Synthroid, Unithroid) 300 mcg tablet Take 1 tablet (300 mcg) by mouth early in the morning.. Take 300 mcg tablet in addition to 100 mcg tablet, total 400 mcg daily on an empty stomach at the same  time each day, either 30 to 60 minutes prior to breakfast 90 tablet 3    losartan (Cozaar) 100 mg tablet TAKE ONE TABLET BY MOUTH DAILY 90 tablet 1    magnesium oxide (Mag-Ox) 400 mg (241.3 mg elemental) tablet TAKE ONE TABLET BY MOUTH TWO TIMES A  tablet 0    meclizine (Antivert) 25 mg tablet TAKE ONE TABLET BY MOUTH AT BEDTIME (Patient taking differently: Take 1 tablet (25 mg) by mouth once daily at bedtime. PRN dizziness) 30 tablet 2    metOLazone (Zaroxolyn) 5 mg tablet Take 1 tablet (5 mg) by mouth. Twice a week      oxybutynin XL (Ditropan-XL) 15 mg 24 hr tablet Take 1 tablet (15 mg) by mouth once daily.      oxybutynin XL (Ditropan-XL) 5 mg 24 hr tablet TAKE ONE TABLET BY MOUTH EVERY EVENING IN ADDITION TO THE 15 MG XL TABS.      oxygen (O2) gas therapy Inhale 2-5 L/min continuously. 2-3 L/min during the day, 5 L/min with CPAP at night      pantoprazole (ProtoNix) 40 mg EC tablet TAKE ONE TABLET BY MOUTH EVERY DAY 90 tablet 0    potassium chloride CR 20 mEq ER tablet Take 1 tablet (20 mEq) by mouth every 12 hours. 180 tablet 0    [DISCONTINUED] amitriptyline (Elavil) 25 mg tablet TAKE ONE TABLET BY MOUTH AT BEDTIME 90 tablet 0    [DISCONTINUED] magnesium oxide (Mag-Ox) 400 mg (241.3 mg magnesium) tablet Take 1 tablet (400 mg) by mouth 2 times a day. 180 tablet 0     No current facility-administered medications on file prior to visit.      Objective   Anticoagulation Summary  As of 4/28/2025      INR goal:  2.0-3.0   TTR:  71.3% (1.4 y)   INR used for dosing:  3.70 (4/28/2025)   Weekly warfarin total:  48.75 mg             Lab Results   Component Value Date    INR 3.70 (A) 04/28/2025    INR 2.70 (N) 04/21/2025    INR 2.30 (N) 04/14/2025    INR 3.40 (A) 11/21/2024    INR 2.40 (N) 11/07/2024    PROTIME 21.3 (H) 01/21/2024    PROTIME 18.2 (H) 01/20/2024    PROTIME 16.2 (H) 01/19/2024         Assessment/Plan   Current INR is Supratherapeutic at 3.7. Previous INR was Therapeutic at 2.7. Thyroid medication  dose reduced from 400 mcg daily to 250 mcg daily (dose reduction typically associated with reduced anticoagulant effects). Patient denies any other changes. No clear cause for elevated INR. As INR is supratherapeutic, advised patient to hold warfarin x1 dose today, then resume warfarin regimen of 3.75mg on Sundays and 7.5mg all other days.    Follow Up: 1 week    Katja Chisholm, PharmD

## 2025-05-02 DIAGNOSIS — E78.00 HYPERCHOLESTEROLEMIA: ICD-10-CM

## 2025-05-02 DIAGNOSIS — I10 BENIGN ESSENTIAL HYPERTENSION: ICD-10-CM

## 2025-05-02 RX ORDER — ATORVASTATIN CALCIUM 40 MG/1
40 TABLET, FILM COATED ORAL DAILY
Qty: 90 TABLET | Refills: 0 | Status: SHIPPED | OUTPATIENT
Start: 2025-05-02

## 2025-05-02 RX ORDER — HYDRALAZINE HYDROCHLORIDE 100 MG/1
100 TABLET, FILM COATED ORAL 3 TIMES DAILY
Qty: 270 TABLET | Refills: 0 | Status: SHIPPED | OUTPATIENT
Start: 2025-05-02

## 2025-05-05 ENCOUNTER — ANTICOAGULATION - WARFARIN VISIT (OUTPATIENT)
Dept: PHARMACY | Facility: HOSPITAL | Age: 70
End: 2025-05-05
Payer: MEDICARE

## 2025-05-05 DIAGNOSIS — E55.9 VITAMIN D DEFICIENCY: ICD-10-CM

## 2025-05-05 DIAGNOSIS — I48.91 ATRIAL FIBRILLATION, UNSPECIFIED TYPE (MULTI): Primary | ICD-10-CM

## 2025-05-05 RX ORDER — VIT C/E/ZN/COPPR/LUTEIN/ZEAXAN 250MG-90MG
125 CAPSULE ORAL DAILY
Qty: 90 CAPSULE | Refills: 0 | Status: SHIPPED | OUTPATIENT
Start: 2025-05-05

## 2025-05-05 NOTE — PROGRESS NOTES
Subjective     Emma Villela is a 70 y.o. female who presents for anticoagulation follow up.     Location: [facility]     Referring Provider: Ti Nolan MD  INR Goal: 2.0-3.0  Indication: Atrial Fibrillation/Atrial Flutter    Current Outpatient Medications on File Prior to Visit   Medication Sig Dispense Refill    acetaminophen (Tylenol) 325 mg tablet Take 1 tablet (325 mg) by mouth every 4 hours if needed.      albuterol 90 mcg/actuation inhaler Inhale 2 puffs every 6 hours if needed.      amitriptyline (Elavil) 25 mg tablet TAKE ONE TABLET BY MOUTH AT BEDTIME 90 tablet 0    aspirin 81 mg EC tablet Take 1 tablet (81 mg) by mouth once daily. 90 tablet 3    atorvastatin (Lipitor) 40 mg tablet TAKE ONE TABLET BY MOUTH DAILY 90 tablet 0    budesonide-formoteroL (Symbicort) 160-4.5 mcg/actuation inhaler Inhale 2 puffs 2 times a day. 10.2 g 11    carvedilol (Coreg) 25 mg tablet Take 0.5 tablets (12.5 mg) by mouth 2 times a day. 180 tablet 0    cholecalciferol (Vitamin D-3) 125 mcg (5000 UT) capsule TAKE ONE CAPSULE BY MOUTH DAILY 90 capsule 0    ferrous gluconate 324 (38 Fe) mg tablet Take 1 tablet (324 mg) by mouth once daily. 90 tablet 1    furosemide (Lasix) 40 mg tablet Take 1 tablet (40 mg) by mouth 2 times a day. 180 tablet 1    hydrALAZINE (Apresoline) 100 mg tablet TAKE ONE TABLET BY MOUTH THREE TIMES A  tablet 0    Jantoven 7.5 mg tablet TAKE ONE TABLET BY MOUTH DAILY 90 tablet 0    levothyroxine (Synthroid, Levoxyl) 100 mcg tablet Take 1 tablet (100 mcg) by mouth early in the morning.. Take 100 mcg in addition to 300 mcg daily total 400 mcg daily on an empty stomach at the same time each day, either 30 to 60 minutes prior to breakfast 90 tablet 3    levothyroxine (Synthroid, Unithroid) 300 mcg tablet Take 1 tablet (300 mcg) by mouth early in the morning.. Take 300 mcg tablet in addition to 100 mcg tablet, total 400 mcg daily on an empty stomach at the same time  each day, either 30 to 60 minutes prior to breakfast 90 tablet 3    losartan (Cozaar) 100 mg tablet TAKE ONE TABLET BY MOUTH DAILY 90 tablet 1    magnesium oxide (Mag-Ox) 400 mg (241.3 mg elemental) tablet TAKE ONE TABLET BY MOUTH TWO TIMES A  tablet 0    meclizine (Antivert) 25 mg tablet TAKE ONE TABLET BY MOUTH AT BEDTIME (Patient taking differently: Take 1 tablet (25 mg) by mouth once daily at bedtime. PRN dizziness) 30 tablet 2    metOLazone (Zaroxolyn) 5 mg tablet Take 1 tablet (5 mg) by mouth. Twice a week      oxybutynin XL (Ditropan-XL) 15 mg 24 hr tablet Take 1 tablet (15 mg) by mouth once daily.      oxybutynin XL (Ditropan-XL) 5 mg 24 hr tablet TAKE ONE TABLET BY MOUTH EVERY EVENING IN ADDITION TO THE 15 MG XL TABS.      oxygen (O2) gas therapy Inhale 2-5 L/min continuously. 2-3 L/min during the day, 5 L/min with CPAP at night      pantoprazole (ProtoNix) 40 mg EC tablet TAKE ONE TABLET BY MOUTH EVERY DAY 90 tablet 0    potassium chloride CR 20 mEq ER tablet Take 1 tablet (20 mEq) by mouth every 12 hours. 180 tablet 0    [DISCONTINUED] atorvastatin (Lipitor) 40 mg tablet TAKE ONE TABLET BY MOUTH EVERY DAY 90 tablet 0    [DISCONTINUED] hydrALAZINE (Apresoline) 100 mg tablet TAKE ONE TABLET BY MOUTH THREE TIMES A  tablet 1     No current facility-administered medications on file prior to visit.      Objective   Anticoagulation Summary  As of 2025      INR goal:  2.0-3.0   TTR:  71.0% (1.5 y)   INR used for dosin.40 (2025)   Weekly warfarin total:  48.75 mg             Lab Results   Component Value Date    INR 2.40 (N) 2025    INR 3.70 (A) 2025    INR 2.70 (N) 2025    INR 3.40 (A) 2024    INR 2.40 (N) 2024    PROTIME 21.3 (H) 2024    PROTIME 18.2 (H) 2024    PROTIME 16.2 (H) 2024         Assessment/Plan   Current INR is Therapeutic at 2.4 Previous INR was Supratherapeutic at 3.7. No clear cause for prior elevation in INR. As INR is  therapeutic, plan to continue usual regimen. Advised patient to continue warfarin regimen of 3.75mg on Sundays and 7.5mg all other days.    Follow Up: 1 week    Katja Chisholm, PharmD

## 2025-05-12 ENCOUNTER — ANTICOAGULATION - WARFARIN VISIT (OUTPATIENT)
Dept: PHARMACY | Facility: HOSPITAL | Age: 70
End: 2025-05-12
Payer: MEDICARE

## 2025-05-12 DIAGNOSIS — I48.91 ATRIAL FIBRILLATION, UNSPECIFIED TYPE (MULTI): Primary | ICD-10-CM

## 2025-05-12 NOTE — PROGRESS NOTES
Subjective     Emma Villela is a 70 y.o. female who presents for anticoagulation follow up.     Location: [facility]     Referring Provider: Ti Nolan MD  INR Goal: 2.0-3.0  Indication: Atrial Fibrillation/Atrial Flutter    Current Outpatient Medications on File Prior to Visit   Medication Sig Dispense Refill    acetaminophen (Tylenol) 325 mg tablet Take 1 tablet (325 mg) by mouth every 4 hours if needed.      albuterol 90 mcg/actuation inhaler Inhale 2 puffs every 6 hours if needed.      amitriptyline (Elavil) 25 mg tablet TAKE ONE TABLET BY MOUTH AT BEDTIME 90 tablet 0    aspirin 81 mg EC tablet Take 1 tablet (81 mg) by mouth once daily. 90 tablet 3    atorvastatin (Lipitor) 40 mg tablet TAKE ONE TABLET BY MOUTH DAILY 90 tablet 0    budesonide-formoteroL (Symbicort) 160-4.5 mcg/actuation inhaler Inhale 2 puffs 2 times a day. 10.2 g 11    carvedilol (Coreg) 25 mg tablet Take 0.5 tablets (12.5 mg) by mouth 2 times a day. 180 tablet 0    cholecalciferol (Vitamin D-3) 125 mcg (5,000 units) capsule TAKE ONE CAPSULE BY MOUTH DAILY 90 capsule 0    ferrous gluconate 324 (38 Fe) mg tablet Take 1 tablet (324 mg) by mouth once daily. 90 tablet 1    furosemide (Lasix) 40 mg tablet Take 1 tablet (40 mg) by mouth 2 times a day. 180 tablet 1    hydrALAZINE (Apresoline) 100 mg tablet TAKE ONE TABLET BY MOUTH THREE TIMES A  tablet 0    Jantoven 7.5 mg tablet TAKE ONE TABLET BY MOUTH DAILY 90 tablet 0    levothyroxine (Synthroid, Levoxyl) 100 mcg tablet Take 1 tablet (100 mcg) by mouth early in the morning.. Take 100 mcg in addition to 300 mcg daily total 400 mcg daily on an empty stomach at the same time each day, either 30 to 60 minutes prior to breakfast 90 tablet 3    levothyroxine (Synthroid, Unithroid) 300 mcg tablet Take 1 tablet (300 mcg) by mouth early in the morning.. Take 300 mcg tablet in addition to 100 mcg tablet, total 400 mcg daily on an empty stomach at the same  time each day, either 30 to 60 minutes prior to breakfast 90 tablet 3    losartan (Cozaar) 100 mg tablet TAKE ONE TABLET BY MOUTH DAILY 90 tablet 1    magnesium oxide (Mag-Ox) 400 mg (241.3 mg elemental) tablet TAKE ONE TABLET BY MOUTH TWO TIMES A  tablet 0    meclizine (Antivert) 25 mg tablet TAKE ONE TABLET BY MOUTH AT BEDTIME (Patient taking differently: Take 1 tablet (25 mg) by mouth once daily at bedtime. PRN dizziness) 30 tablet 2    metOLazone (Zaroxolyn) 5 mg tablet Take 1 tablet (5 mg) by mouth. Twice a week      oxybutynin XL (Ditropan-XL) 15 mg 24 hr tablet Take 1 tablet (15 mg) by mouth once daily.      oxybutynin XL (Ditropan-XL) 5 mg 24 hr tablet TAKE ONE TABLET BY MOUTH EVERY EVENING IN ADDITION TO THE 15 MG XL TABS.      oxygen (O2) gas therapy Inhale 2-5 L/min continuously. 2-3 L/min during the day, 5 L/min with CPAP at night      pantoprazole (ProtoNix) 40 mg EC tablet TAKE ONE TABLET BY MOUTH EVERY DAY 90 tablet 0    potassium chloride CR 20 mEq ER tablet Take 1 tablet (20 mEq) by mouth every 12 hours. 180 tablet 0     No current facility-administered medications on file prior to visit.      Objective   Anticoagulation Summary  As of 5/12/2025      INR goal:  2.0-3.0   TTR:  71.0% (1.5 y)   INR used for dosing:  --             Lab Results   Component Value Date    INR 2.40 (N) 05/05/2025    INR 3.70 (A) 04/28/2025    INR 2.70 (N) 04/21/2025    INR 3.40 (A) 11/21/2024    INR 2.40 (N) 11/07/2024    PROTIME 21.3 (H) 01/21/2024    PROTIME 18.2 (H) 01/20/2024    PROTIME 16.2 (H) 01/19/2024         Assessment/Plan   Current INR is Therapeutic at 2.1. Previous INR was Therapeutic at 2.4. As INR is therapeutic, no changes to warfarin regimen needed. Plan to continue warfarin regimen of 3.75mg on Sundays and 7.5mg all other days.    Follow Up: 1 week    Katja Chisholm, PharmD

## 2025-05-16 DIAGNOSIS — I10 BENIGN ESSENTIAL HYPERTENSION: ICD-10-CM

## 2025-05-16 DIAGNOSIS — E87.6 HYPOKALEMIA: ICD-10-CM

## 2025-05-19 ENCOUNTER — ANTICOAGULATION - WARFARIN VISIT (OUTPATIENT)
Dept: PHARMACY | Facility: HOSPITAL | Age: 70
End: 2025-05-19
Payer: MEDICARE

## 2025-05-19 DIAGNOSIS — I48.91 ATRIAL FIBRILLATION, UNSPECIFIED TYPE (MULTI): Primary | ICD-10-CM

## 2025-05-19 RX ORDER — POTASSIUM CHLORIDE 1500 MG/1
20 TABLET, EXTENDED RELEASE ORAL EVERY 12 HOURS
Qty: 180 TABLET | Refills: 0 | Status: SHIPPED | OUTPATIENT
Start: 2025-05-19

## 2025-05-19 RX ORDER — LOSARTAN POTASSIUM 100 MG/1
100 TABLET ORAL DAILY
Qty: 90 TABLET | Refills: 0 | Status: SHIPPED | OUTPATIENT
Start: 2025-05-19

## 2025-05-19 NOTE — PROGRESS NOTES
Subjective     Emma Villela is a 70 y.o. female who presents for anticoagulation follow up.     Location: [facility]     Referring Provider: Ti Nolan MD  INR Goal: 2.0-3.0  Indication: Atrial Fibrillation/Atrial Flutter    Current Outpatient Medications on File Prior to Visit   Medication Sig Dispense Refill    acetaminophen (Tylenol) 325 mg tablet Take 1 tablet (325 mg) by mouth every 4 hours if needed.      albuterol 90 mcg/actuation inhaler Inhale 2 puffs every 6 hours if needed.      amitriptyline (Elavil) 25 mg tablet TAKE ONE TABLET BY MOUTH AT BEDTIME 90 tablet 0    aspirin 81 mg EC tablet Take 1 tablet (81 mg) by mouth once daily. 90 tablet 3    atorvastatin (Lipitor) 40 mg tablet TAKE ONE TABLET BY MOUTH DAILY 90 tablet 0    budesonide-formoteroL (Symbicort) 160-4.5 mcg/actuation inhaler Inhale 2 puffs 2 times a day. 10.2 g 11    carvedilol (Coreg) 25 mg tablet Take 0.5 tablets (12.5 mg) by mouth 2 times a day. 180 tablet 0    cholecalciferol (Vitamin D-3) 125 mcg (5,000 units) capsule TAKE ONE CAPSULE BY MOUTH DAILY 90 capsule 0    ferrous gluconate 324 (38 Fe) mg tablet Take 1 tablet (324 mg) by mouth once daily. 90 tablet 1    furosemide (Lasix) 40 mg tablet Take 1 tablet (40 mg) by mouth 2 times a day. 180 tablet 1    hydrALAZINE (Apresoline) 100 mg tablet TAKE ONE TABLET BY MOUTH THREE TIMES A  tablet 0    Jantoven 7.5 mg tablet TAKE ONE TABLET BY MOUTH DAILY 90 tablet 0    levothyroxine (Synthroid, Levoxyl) 100 mcg tablet Take 1 tablet (100 mcg) by mouth early in the morning.. Take 100 mcg in addition to 300 mcg daily total 400 mcg daily on an empty stomach at the same time each day, either 30 to 60 minutes prior to breakfast 90 tablet 3    levothyroxine (Synthroid, Unithroid) 300 mcg tablet Take 1 tablet (300 mcg) by mouth early in the morning.. Take 300 mcg tablet in addition to 100 mcg tablet, total 400 mcg daily on an empty stomach at the same  time each day, either 30 to 60 minutes prior to breakfast 90 tablet 3    losartan (Cozaar) 100 mg tablet TAKE ONE TABLET BY MOUTH DAILY 90 tablet 1    magnesium oxide (Mag-Ox) 400 mg (241.3 mg elemental) tablet TAKE ONE TABLET BY MOUTH TWO TIMES A  tablet 0    meclizine (Antivert) 25 mg tablet TAKE ONE TABLET BY MOUTH AT BEDTIME (Patient taking differently: Take 1 tablet (25 mg) by mouth once daily at bedtime. PRN dizziness) 30 tablet 2    metOLazone (Zaroxolyn) 5 mg tablet Take 1 tablet (5 mg) by mouth. Twice a week      oxybutynin XL (Ditropan-XL) 15 mg 24 hr tablet Take 1 tablet (15 mg) by mouth once daily.      oxybutynin XL (Ditropan-XL) 5 mg 24 hr tablet TAKE ONE TABLET BY MOUTH EVERY EVENING IN ADDITION TO THE 15 MG XL TABS.      oxygen (O2) gas therapy Inhale 2-5 L/min continuously. 2-3 L/min during the day, 5 L/min with CPAP at night      pantoprazole (ProtoNix) 40 mg EC tablet TAKE ONE TABLET BY MOUTH EVERY DAY 90 tablet 0    potassium chloride CR 20 mEq ER tablet Take 1 tablet (20 mEq) by mouth every 12 hours. 180 tablet 0     No current facility-administered medications on file prior to visit.      Objective   Anticoagulation Summary  As of 5/19/2025      INR goal:  2.0-3.0   TTR:  71.4% (1.5 y)   INR used for dosing:  --             Lab Results   Component Value Date    INR 2.10 (N) 05/12/2025    INR 2.40 (N) 05/05/2025    INR 3.70 (A) 04/28/2025    INR 3.40 (A) 11/21/2024    INR 2.40 (N) 11/07/2024    PROTIME 21.3 (H) 01/21/2024    PROTIME 18.2 (H) 01/20/2024    PROTIME 16.2 (H) 01/19/2024         Assessment/Plan   Current INR is Therapeutic at 2.4. Previous INR was Therapeutic at 2.1. As INR is therapeutic, no changes to warfarin regimen needed. Plan to continue warfarin regimen of 3.75mg on Sundays and 7.5mg all other days.    Follow Up: 1 week    Katja Chisholm, PharmD

## 2025-05-27 ENCOUNTER — ANTICOAGULATION - WARFARIN VISIT (OUTPATIENT)
Dept: PHARMACY | Facility: HOSPITAL | Age: 70
End: 2025-05-27
Payer: MEDICARE

## 2025-05-27 DIAGNOSIS — I48.91 ATRIAL FIBRILLATION, UNSPECIFIED TYPE (MULTI): Primary | ICD-10-CM

## 2025-05-27 NOTE — PROGRESS NOTES
Subjective     Emma Villela is a 70 y.o. female who presents for anticoagulation follow up.     Location: [facility]     Referring Provider: Ti Nolan MD  INR Goal: 2.0-3.0  Indication: Atrial Fibrillation/Atrial Flutter    Current Outpatient Medications on File Prior to Visit   Medication Sig Dispense Refill    acetaminophen (Tylenol) 325 mg tablet Take 1 tablet (325 mg) by mouth every 4 hours if needed.      albuterol 90 mcg/actuation inhaler Inhale 2 puffs every 6 hours if needed.      amitriptyline (Elavil) 25 mg tablet TAKE ONE TABLET BY MOUTH AT BEDTIME 90 tablet 0    aspirin 81 mg EC tablet Take 1 tablet (81 mg) by mouth once daily. 90 tablet 3    atorvastatin (Lipitor) 40 mg tablet TAKE ONE TABLET BY MOUTH DAILY 90 tablet 0    budesonide-formoteroL (Symbicort) 160-4.5 mcg/actuation inhaler Inhale 2 puffs 2 times a day. 10.2 g 11    carvedilol (Coreg) 25 mg tablet Take 0.5 tablets (12.5 mg) by mouth 2 times a day. 180 tablet 0    cholecalciferol (Vitamin D-3) 125 mcg (5,000 units) capsule TAKE ONE CAPSULE BY MOUTH DAILY 90 capsule 0    ferrous gluconate 324 (38 Fe) mg tablet Take 1 tablet (324 mg) by mouth once daily. 90 tablet 1    furosemide (Lasix) 40 mg tablet Take 1 tablet (40 mg) by mouth 2 times a day. 180 tablet 1    hydrALAZINE (Apresoline) 100 mg tablet TAKE ONE TABLET BY MOUTH THREE TIMES A  tablet 0    Jantoven 7.5 mg tablet TAKE ONE TABLET BY MOUTH DAILY 90 tablet 0    levothyroxine (Synthroid, Levoxyl) 100 mcg tablet Take 1 tablet (100 mcg) by mouth early in the morning.. Take 100 mcg in addition to 300 mcg daily total 400 mcg daily on an empty stomach at the same time each day, either 30 to 60 minutes prior to breakfast 90 tablet 3    levothyroxine (Synthroid, Unithroid) 300 mcg tablet Take 1 tablet (300 mcg) by mouth early in the morning.. Take 300 mcg tablet in addition to 100 mcg tablet, total 400 mcg daily on an empty stomach at the same  time each day, either 30 to 60 minutes prior to breakfast 90 tablet 3    losartan (Cozaar) 100 mg tablet TAKE ONE TABLET BY MOUTH DAILY 90 tablet 0    magnesium oxide (Mag-Ox) 400 mg (241.3 mg elemental) tablet TAKE ONE TABLET BY MOUTH TWO TIMES A  tablet 0    meclizine (Antivert) 25 mg tablet TAKE ONE TABLET BY MOUTH AT BEDTIME (Patient taking differently: Take 1 tablet (25 mg) by mouth once daily at bedtime. PRN dizziness) 30 tablet 2    metOLazone (Zaroxolyn) 5 mg tablet Take 1 tablet (5 mg) by mouth. Twice a week      oxybutynin XL (Ditropan-XL) 15 mg 24 hr tablet Take 1 tablet (15 mg) by mouth once daily.      oxybutynin XL (Ditropan-XL) 5 mg 24 hr tablet TAKE ONE TABLET BY MOUTH EVERY EVENING IN ADDITION TO THE 15 MG XL TABS.      oxygen (O2) gas therapy Inhale 2-5 L/min continuously. 2-3 L/min during the day, 5 L/min with CPAP at night      pantoprazole (ProtoNix) 40 mg EC tablet TAKE ONE TABLET BY MOUTH EVERY DAY 90 tablet 0    potassium chloride CR 20 mEq ER tablet TAKE ONE TABLET BY MOUTH EVERY 12 HOURS 180 tablet 0     No current facility-administered medications on file prior to visit.      Objective   Anticoagulation Summary  As of 2025      INR goal:  2.0-3.0   TTR:  72.1% (1.5 y)   INR used for dosin.60 (2025)   Weekly warfarin total:  48.75 mg             Lab Results   Component Value Date    INR 2.60 (N) 2025    INR 2.40 (N) 2025    INR 2.10 (N) 2025    INR 3.40 (A) 2024    INR 2.40 (N) 2024    PROTIME 21.3 (H) 2024    PROTIME 18.2 (H) 2024    PROTIME 16.2 (H) 2024         Assessment/Plan   Current INR is Therapeutic at 2.6. Previous INR was Therapeutic at 2.4. As INR is therapeutic, no changes to warfarin regimen needed. Plan to continue warfarin regimen of 3.75mg on Sundays and 7.5mg all other days.    Follow Up: 1 week    Katja Chisholm, PharmD

## 2025-06-02 ENCOUNTER — ANTICOAGULATION - WARFARIN VISIT (OUTPATIENT)
Dept: PHARMACY | Facility: HOSPITAL | Age: 70
End: 2025-06-02
Payer: MEDICARE

## 2025-06-02 DIAGNOSIS — I48.91 ATRIAL FIBRILLATION, UNSPECIFIED TYPE (MULTI): Primary | ICD-10-CM

## 2025-06-02 NOTE — PROGRESS NOTES
Subjective     Emma Villela is a 70 y.o. female who presents for anticoagulation follow up.     Location: [facility]     Referring Provider: Ti Nolan MD  INR Goal: 2.0-3.0  Indication: Atrial Fibrillation/Atrial Flutter    Current Outpatient Medications on File Prior to Visit   Medication Sig Dispense Refill    acetaminophen (Tylenol) 325 mg tablet Take 1 tablet (325 mg) by mouth every 4 hours if needed.      albuterol 90 mcg/actuation inhaler Inhale 2 puffs every 6 hours if needed.      amitriptyline (Elavil) 25 mg tablet TAKE ONE TABLET BY MOUTH AT BEDTIME 90 tablet 0    aspirin 81 mg EC tablet Take 1 tablet (81 mg) by mouth once daily. 90 tablet 3    atorvastatin (Lipitor) 40 mg tablet TAKE ONE TABLET BY MOUTH DAILY 90 tablet 0    budesonide-formoteroL (Symbicort) 160-4.5 mcg/actuation inhaler Inhale 2 puffs 2 times a day. 10.2 g 11    carvedilol (Coreg) 25 mg tablet Take 0.5 tablets (12.5 mg) by mouth 2 times a day. 180 tablet 0    cholecalciferol (Vitamin D-3) 125 mcg (5,000 units) capsule TAKE ONE CAPSULE BY MOUTH DAILY 90 capsule 0    ferrous gluconate 324 (38 Fe) mg tablet Take 1 tablet (324 mg) by mouth once daily. 90 tablet 1    furosemide (Lasix) 40 mg tablet Take 1 tablet (40 mg) by mouth 2 times a day. 180 tablet 1    hydrALAZINE (Apresoline) 100 mg tablet TAKE ONE TABLET BY MOUTH THREE TIMES A  tablet 0    Jantoven 7.5 mg tablet TAKE ONE TABLET BY MOUTH DAILY 90 tablet 0    levothyroxine (Synthroid, Levoxyl) 100 mcg tablet Take 1 tablet (100 mcg) by mouth early in the morning.. Take 100 mcg in addition to 300 mcg daily total 400 mcg daily on an empty stomach at the same time each day, either 30 to 60 minutes prior to breakfast 90 tablet 3    levothyroxine (Synthroid, Unithroid) 300 mcg tablet Take 1 tablet (300 mcg) by mouth early in the morning.. Take 300 mcg tablet in addition to 100 mcg tablet, total 400 mcg daily on an empty stomach at the same  time each day, either 30 to 60 minutes prior to breakfast 90 tablet 3    losartan (Cozaar) 100 mg tablet TAKE ONE TABLET BY MOUTH DAILY 90 tablet 0    magnesium oxide (Mag-Ox) 400 mg (241.3 mg elemental) tablet TAKE ONE TABLET BY MOUTH TWO TIMES A  tablet 0    meclizine (Antivert) 25 mg tablet TAKE ONE TABLET BY MOUTH AT BEDTIME (Patient taking differently: Take 1 tablet (25 mg) by mouth once daily at bedtime. PRN dizziness) 30 tablet 2    metOLazone (Zaroxolyn) 5 mg tablet Take 1 tablet (5 mg) by mouth. Twice a week      oxybutynin XL (Ditropan-XL) 15 mg 24 hr tablet Take 1 tablet (15 mg) by mouth once daily.      oxybutynin XL (Ditropan-XL) 5 mg 24 hr tablet TAKE ONE TABLET BY MOUTH EVERY EVENING IN ADDITION TO THE 15 MG XL TABS.      oxygen (O2) gas therapy Inhale 2-5 L/min continuously. 2-3 L/min during the day, 5 L/min with CPAP at night      pantoprazole (ProtoNix) 40 mg EC tablet TAKE ONE TABLET BY MOUTH EVERY DAY 90 tablet 0    potassium chloride CR 20 mEq ER tablet TAKE ONE TABLET BY MOUTH EVERY 12 HOURS 180 tablet 0     No current facility-administered medications on file prior to visit.      Objective   Anticoagulation Summary  As of 2025      INR goal:  2.0-3.0   TTR:  72.4% (1.5 y)   INR used for dosin.50 (2025)   Weekly warfarin total:  48.75 mg             Lab Results   Component Value Date    INR 2.50 (N) 2025    INR 2.60 (N) 2025    INR 2.40 (N) 2025    INR 3.40 (A) 2024    INR 2.40 (N) 2024    PROTIME 21.3 (H) 2024    PROTIME 18.2 (H) 2024    PROTIME 16.2 (H) 2024         Assessment/Plan   Current INR is Therapeutic at 2.5. Previous INR was Therapeutic at 2.6. As INR is therapeutic, no changes to warfarin regimen needed. Plan to continue warfarin regimen of 3.75mg on Sundays and 7.5mg all other days.    Follow Up: 1 week    Katja Chisholm, PharmD

## 2025-06-06 DIAGNOSIS — E03.9 HYPOTHYROIDISM, UNSPECIFIED TYPE: ICD-10-CM

## 2025-06-06 DIAGNOSIS — E01.0 THYROMEGALY: ICD-10-CM

## 2025-06-10 ENCOUNTER — TELEPHONE (OUTPATIENT)
Dept: ENDOCRINOLOGY | Facility: HOSPITAL | Age: 70
End: 2025-06-10
Payer: MEDICARE

## 2025-06-10 ENCOUNTER — ANTICOAGULATION - WARFARIN VISIT (OUTPATIENT)
Dept: PHARMACY | Facility: HOSPITAL | Age: 70
End: 2025-06-10
Payer: MEDICARE

## 2025-06-10 DIAGNOSIS — E01.0 THYROMEGALY: ICD-10-CM

## 2025-06-10 DIAGNOSIS — E03.9 HYPOTHYROIDISM, UNSPECIFIED TYPE: Primary | ICD-10-CM

## 2025-06-10 DIAGNOSIS — I48.91 ATRIAL FIBRILLATION, UNSPECIFIED TYPE (MULTI): Primary | ICD-10-CM

## 2025-06-10 LAB
INR IN PPP BY COAGULATION ASSAY EXTERNAL: 2.2 (ref 2–3)
PROTHROMBIN TIME (PT) IN PPP BY COAGULATION ASSAY EXTERNAL: ABNORMAL
T3 SERPL-MCNC: 103 NG/DL (ref 76–181)
T4 FREE SERPL-MCNC: 2.4 NG/DL (ref 0.8–1.8)
TSH SERPL-ACNC: 0.02 MIU/L (ref 0.4–4.5)

## 2025-06-10 RX ORDER — LEVOTHYROXINE SODIUM 150 UG/1
150 TABLET ORAL DAILY
Qty: 90 TABLET | Refills: 4 | Status: SHIPPED | OUTPATIENT
Start: 2025-06-10 | End: 2026-09-03

## 2025-06-10 NOTE — TELEPHONE ENCOUNTER
Called patient to discuss about lab work results.  Patient is currently taking tablet levothyroxine 250 mcg daily and reports compliance.  She does not report any complaints.    Lab Results   Component Value Date    TSH 0.02 (L) 06/09/2025    FREET4 2.4 (H) 06/09/2025    G5EXZUM 103 06/09/2025      Recommendations:  Decrease dose of Tab levothyroxine to 150 mcg daily.  Patient has leftover 300 mcg and 100 mcg tablets from previous refills (approx 30 tabs of each strength at home).  She was advised to take 1/2 of 300 mcg pill OR 1.5 tabs of 100 mcg dose to make 150 mcg daily.   New order placed for tablet levothyroxine 150 mcg daily with refills (once she finishes with leftover medication)   Patient was advised to take levothyroxine empty stomach and wait for 30 to 40 minutes before taking other medications/food.  Plan to repeat TFTs in 6 weeks (around 7/18) and adjust dose of levothyroxine accordingly.    Pt understood and gave appropriate teach back about the plan of care. All questions  were answered to the patient's satisfaction. The patient is instructed to contact us at any time if questions or problems arise.     The patient was discussed with attending Dr. Hortencia Garcia MD  Endocrinology fellow

## 2025-06-10 NOTE — PROGRESS NOTES
Subjective     Emma Villela is a 70 y.o. female who presents for anticoagulation follow up.     Location: [facility]     Referring Provider: Ti Nolan MD  INR Goal: 2.0-3.0  Indication: Atrial Fibrillation/Atrial Flutter    Current Outpatient Medications on File Prior to Visit   Medication Sig Dispense Refill    acetaminophen (Tylenol) 325 mg tablet Take 1 tablet (325 mg) by mouth every 4 hours if needed.      albuterol 90 mcg/actuation inhaler Inhale 2 puffs every 6 hours if needed.      amitriptyline (Elavil) 25 mg tablet TAKE ONE TABLET BY MOUTH AT BEDTIME 90 tablet 0    aspirin 81 mg EC tablet Take 1 tablet (81 mg) by mouth once daily. 90 tablet 3    atorvastatin (Lipitor) 40 mg tablet TAKE ONE TABLET BY MOUTH DAILY 90 tablet 0    budesonide-formoteroL (Symbicort) 160-4.5 mcg/actuation inhaler Inhale 2 puffs 2 times a day. 10.2 g 11    carvedilol (Coreg) 25 mg tablet Take 0.5 tablets (12.5 mg) by mouth 2 times a day. 180 tablet 0    cholecalciferol (Vitamin D-3) 125 mcg (5,000 units) capsule TAKE ONE CAPSULE BY MOUTH DAILY 90 capsule 0    ferrous gluconate 324 (38 Fe) mg tablet Take 1 tablet (324 mg) by mouth once daily. 90 tablet 1    furosemide (Lasix) 40 mg tablet Take 1 tablet (40 mg) by mouth 2 times a day. 180 tablet 1    hydrALAZINE (Apresoline) 100 mg tablet TAKE ONE TABLET BY MOUTH THREE TIMES A  tablet 0    Jantoven 7.5 mg tablet TAKE ONE TABLET BY MOUTH DAILY 90 tablet 0    levothyroxine (Synthroid, Levoxyl) 150 mcg tablet Take 1 tablet (150 mcg) by mouth early in the morning.. Take on an empty stomach at the same time each day, either 30 to 60 minutes prior to breakfast 90 tablet 4    losartan (Cozaar) 100 mg tablet TAKE ONE TABLET BY MOUTH DAILY 90 tablet 0    magnesium oxide (Mag-Ox) 400 mg (241.3 mg elemental) tablet TAKE ONE TABLET BY MOUTH TWO TIMES A  tablet 0    meclizine (Antivert) 25 mg tablet TAKE ONE TABLET BY MOUTH AT BEDTIME  (Patient taking differently: Take 1 tablet (25 mg) by mouth once daily at bedtime. PRN dizziness) 30 tablet 2    metOLazone (Zaroxolyn) 5 mg tablet Take 1 tablet (5 mg) by mouth. Twice a week      oxybutynin XL (Ditropan-XL) 15 mg 24 hr tablet Take 1 tablet (15 mg) by mouth once daily.      oxybutynin XL (Ditropan-XL) 5 mg 24 hr tablet TAKE ONE TABLET BY MOUTH EVERY EVENING IN ADDITION TO THE 15 MG XL TABS.      oxygen (O2) gas therapy Inhale 2-5 L/min continuously. 2-3 L/min during the day, 5 L/min with CPAP at night      pantoprazole (ProtoNix) 40 mg EC tablet TAKE ONE TABLET BY MOUTH EVERY DAY 90 tablet 0    potassium chloride CR 20 mEq ER tablet TAKE ONE TABLET BY MOUTH EVERY 12 HOURS 180 tablet 0    [DISCONTINUED] levothyroxine (Synthroid, Levoxyl) 100 mcg tablet Take 1 tablet (100 mcg) by mouth early in the morning.. Take 100 mcg in addition to 300 mcg daily total 400 mcg daily on an empty stomach at the same time each day, either 30 to 60 minutes prior to breakfast 90 tablet 3    [DISCONTINUED] levothyroxine (Synthroid, Unithroid) 300 mcg tablet Take 1 tablet (300 mcg) by mouth early in the morning.. Take 300 mcg tablet in addition to 100 mcg tablet, total 400 mcg daily on an empty stomach at the same time each day, either 30 to 60 minutes prior to breakfast 90 tablet 3     No current facility-administered medications on file prior to visit.      Objective   Anticoagulation Summary  As of 6/10/2025      INR goal:  2.0-3.0   TTR:  72.8% (1.6 y)   INR used for dosin.20 (6/10/2025)   Weekly warfarin total:  48.75 mg             Lab Results   Component Value Date    INR 2.20 (N) 06/10/2025    INR 2.50 (N) 2025    INR 2.60 (N) 2025    INR 3.40 (A) 2024    INR 2.40 (N) 2024    PROTIME 21.3 (H) 2024    PROTIME 18.2 (H) 2024    PROTIME 16.2 (H) 2024         Assessment/Plan   Current INR is Therapeutic at 2.2. Previous INR was Therapeutic at 2.5. As INR is therapeutic, no  changes to warfarin regimen needed. Plan to continue warfarin regimen of 3.75mg on Sundays and 7.5mg all other days.    Follow Up: 1 week    Katja Chisholm, PharmD

## 2025-06-12 ENCOUNTER — APPOINTMENT (OUTPATIENT)
Dept: PRIMARY CARE | Facility: CLINIC | Age: 70
End: 2025-06-12
Payer: MEDICARE

## 2025-06-12 VITALS
HEART RATE: 101 BPM | BODY MASS INDEX: 47.09 KG/M2 | SYSTOLIC BLOOD PRESSURE: 136 MMHG | DIASTOLIC BLOOD PRESSURE: 84 MMHG | WEIGHT: 293 LBS | TEMPERATURE: 97.5 F | HEIGHT: 66 IN | OXYGEN SATURATION: 94 %

## 2025-06-12 DIAGNOSIS — K21.9 GASTROESOPHAGEAL REFLUX DISEASE WITHOUT ESOPHAGITIS: ICD-10-CM

## 2025-06-12 DIAGNOSIS — H81.10 BENIGN PAROXYSMAL POSITIONAL VERTIGO, UNSPECIFIED LATERALITY: ICD-10-CM

## 2025-06-12 DIAGNOSIS — I48.91 ATRIAL FIBRILLATION, UNSPECIFIED TYPE (MULTI): ICD-10-CM

## 2025-06-12 DIAGNOSIS — I10 BENIGN ESSENTIAL HYPERTENSION: ICD-10-CM

## 2025-06-12 DIAGNOSIS — Z12.31 ENCOUNTER FOR SCREENING MAMMOGRAM FOR MALIGNANT NEOPLASM OF BREAST: ICD-10-CM

## 2025-06-12 DIAGNOSIS — E66.01 MORBID OBESITY (MULTI): ICD-10-CM

## 2025-06-12 DIAGNOSIS — Z00.00 ROUTINE GENERAL MEDICAL EXAMINATION AT HEALTH CARE FACILITY: Primary | ICD-10-CM

## 2025-06-12 DIAGNOSIS — E89.0 HYPOTHYROIDISM, POSTSURGICAL: ICD-10-CM

## 2025-06-12 DIAGNOSIS — I50.9 CHF (NYHA CLASS III, ACC/AHA STAGE C) (MULTI): ICD-10-CM

## 2025-06-12 DIAGNOSIS — J44.9 CHRONIC OBSTRUCTIVE PULMONARY DISEASE, UNSPECIFIED COPD TYPE (MULTI): ICD-10-CM

## 2025-06-12 RX ORDER — FUROSEMIDE 40 MG/1
40 TABLET ORAL 2 TIMES DAILY
Qty: 180 TABLET | Refills: 1 | Status: SHIPPED | OUTPATIENT
Start: 2025-06-12

## 2025-06-12 RX ORDER — CARVEDILOL 25 MG/1
12.5 TABLET ORAL 2 TIMES DAILY
Qty: 180 TABLET | Refills: 0 | Status: SHIPPED | OUTPATIENT
Start: 2025-06-12

## 2025-06-12 ASSESSMENT — ACTIVITIES OF DAILY LIVING (ADL)
DOING_HOUSEWORK: INDEPENDENT
MANAGING_FINANCES: INDEPENDENT
TAKING_MEDICATION: INDEPENDENT
GROCERY_SHOPPING: INDEPENDENT
DRESSING: INDEPENDENT
BATHING: INDEPENDENT

## 2025-06-12 NOTE — PROGRESS NOTES
Subjective   Reason for Visit: Emma Villela is an 70 y.o. female here for a Medicare Wellness visit.     Past Medical, Surgical, and Family History reviewed and updated in chart.         - Annual Medicare physical and preventive visit  - Screening mammogram repeat September 2025  - Screening for colon cancer up-to-date  -Screening for depression negative directive reviewed  -Recent blood work reviewed and up-to-date deconditioning slowly improving continue with physical therapy  - Morbid obesity counseled about BMI weight loss  I spent >15minutes minutes face to face with individial providing recommendations for nutrition choices and exercise plan to help achieve weight reduction.    Follow-up  -Abnormal thyroid function test seen by endocrinology Dr. Burnett, levothyroxine adjusted now takes 150 mcg daily patient instructed to use a whole tablet not to cut tablets and use it in empty stomach without any medication for half an hour patient scheduled to repeat thyroid function test in 4 to 6 weeks follow-up endocontinue monitoring closely  - Thyromegaly slowly improving no pressure symptoms  -Morbid obesity counseled about weight loss patient may benefit from Wegovy treatment preauthorization obtained patient cannot afford the medication due to high deductible continue with low-carb diet  - afib (s/p ablation, on warfarin), COPD (on 2L continuous oxygen), morbid obesity, CVI, HTN, hypothyroidism and AAA (s/p EVAR 2016) now s/p right renal artery stenting (6x59m [place] VBX), aortic stent graft placement ([id number] [place] aortic cuff x2), bilateral iliac stent graft placement, and right hypogastric artery coil embolization on 1/17/2024  - Renal insufficiency stable continue with current medication Lasix 40 mg twice a day chlorthalidone twice a week.  --Chronic respiratory failure continues 2 L oxygen  - Obstructive sleep apnea continue CPAP.  -- Hypertension controlled continue with carvedilol half tablet twice a day  "patient controlled no need to go to isosorbide at this time continue monitoring closely  - -Atrial fibrillation controlled to continue with carvedilol blood pressure controlled heart rate controlled  - Atrial fibrillation patient seen by Coumadin clinic review 1 further follow-up INR closely.  - Hypomagnesemia continue with magnesium tablet daily  - Vitamin D deficiency need to start on vitamin D 5000 daily  - Hypokalemia continue with potassium 40 mg twice a day follow-up BMP for further recommendation  -Follow-up 3 months                Patient Care Team:  Ti Nolan MD as PCP - General  Ti Nolan MD [facility] PCP [facility]     Review of Systems    Objective   Vitals:  /84   Pulse 101   Temp 36.4 °C (97.5 °F)   Ht 1.676 m (5' 6\")   Wt 137 kg (302 lb 12.8 oz)   LMP  (LMP Unknown)   SpO2 94%   BMI 48.87 kg/m²       Physical Exam    Assessment & Plan  Benign essential hypertension    Orders:    carvedilol (Coreg) 25 mg tablet; Take 0.5 tablets (12.5 mg) by mouth 2 times a day.    CHF (NYHA class III, ACC/AHA stage C) (Multi)    Orders:    furosemide (Lasix) 40 mg tablet; Take 1 tablet (40 mg) by mouth 2 times a day.    Routine general medical examination at health care facility    Orders:    1 Year Follow Up In Primary Care - Wellness Exam; Future    Encounter for screening mammogram for malignant neoplasm of breast    Orders:    BI mammo bilateral screening tomosynthesis; Future    Benign paroxysmal positional vertigo, unspecified laterality         Chronic obstructive pulmonary disease, unspecified COPD type (Multi)         Atrial fibrillation, unspecified type (Multi)         Hypothyroidism, postsurgical         Gastroesophageal reflux disease without esophagitis         Morbid obesity (Multi)       - Annual Medicare physical and preventive visit  - Screening mammogram repeat September 2025  - Screening for colon cancer up-to-date  -Screening for depression negative " directive reviewed  -Recent blood work reviewed and up-to-date deconditioning slowly improving continue with physical therapy  - Morbid obesity counseled about BMI weight loss  I spent >15minutes minutes face to face with individial providing recommendations for nutrition choices and exercise plan to help achieve weight reduction.    Follow-up  -Abnormal thyroid function test seen by endocrinology Dr. Burnett, levothyroxine adjusted now takes 150 mcg daily patient instructed to use a whole tablet not to cut tablets and use it in empty stomach without any medication for half an hour patient scheduled to repeat thyroid function test in 4 to 6 weeks follow-up endocontinue monitoring closely  - Thyromegaly slowly improving no pressure symptoms  -Morbid obesity counseled about weight loss patient may benefit from Wegovy treatment preauthorization obtained patient cannot afford the medication due to high deductible continue with low-carb diet  - afib (s/p ablation, on warfarin), COPD (on 2L continuous oxygen), morbid obesity, CVI, HTN, hypothyroidism and AAA (s/p EVAR 2016) now s/p right renal artery stenting (6x59m [place] VBX), aortic stent graft placement ([id number] [place] aortic cuff x2), bilateral iliac stent graft placement, and right hypogastric artery coil embolization on 1/17/2024  - Renal insufficiency stable continue with current medication Lasix 40 mg twice a day chlorthalidone twice a week.  --Chronic respiratory failure continues 2 L oxygen  - Obstructive sleep apnea continue CPAP.  -- Hypertension controlled continue with carvedilol half tablet twice a day patient controlled no need to go to isosorbide at this time continue monitoring closely  - -Atrial fibrillation controlled to continue with carvedilol blood pressure controlled heart rate controlled  - Atrial fibrillation patient seen by Coumadin clinic review 1 further follow-up INR closely.  - Hypomagnesemia continue with magnesium tablet daily  - Vitamin D  deficiency need to start on vitamin D 5000 daily  - Hypokalemia continue with potassium 40 mg twice a day follow-up BMP for further recommendation  -Follow-up 3 months

## 2025-06-12 NOTE — PROGRESS NOTES
"Subjective   Patient ID: Emma Villela is a 70 y.o. female who presents for Medicare Annual Wellness Visit Subsequent.    HPI       Review of Systems    Objective   Lab Results   Component Value Date    HGBA1C 5.5 12/28/2023      /84   Pulse 101   Temp 36.4 °C (97.5 °F)   Ht 1.676 m (5' 6\")   Wt 137 kg (302 lb 12.8 oz)   LMP  (LMP Unknown)   SpO2 94%   BMI 48.87 kg/m²   Lab Results   Component Value Date    WBC 6.9 06/12/2024    HGB 10.2 (L) 06/12/2024    HCT 32.7 (L) 06/12/2024     06/12/2024    CHOL 133 06/12/2024    TRIG 129 06/12/2024    HDL 25.8 06/12/2024    ALT 10 06/12/2024    AST 17 06/12/2024     08/06/2024    K 3.9 08/06/2024    CL 96 (L) 08/06/2024    CREATININE 1.30 (H) 08/06/2024    BUN 24 (H) 08/06/2024    CO2 40 (HH) 08/06/2024    TSH 0.02 (L) 06/09/2025    INR 2.20 (N) 06/10/2025    HGBA1C 5.5 12/28/2023     par   Physical Exam    Assessment/Plan   Emma was seen today for medicare annual wellness visit subsequent.  Diagnoses and all orders for this visit:  Benign essential hypertension  CHF (NYHA class III, ACC/AHA stage C) (Multi)     "

## 2025-06-12 NOTE — ASSESSMENT & PLAN NOTE
Orders:    carvedilol (Coreg) 25 mg tablet; Take 0.5 tablets (12.5 mg) by mouth 2 times a day.

## 2025-06-13 RX ORDER — MECLIZINE HYDROCHLORIDE 25 MG/1
25 TABLET ORAL NIGHTLY
COMMUNITY
Start: 2025-06-13

## 2025-06-13 NOTE — ASSESSMENT & PLAN NOTE
- Annual Medicare physical and preventive visit  - Screening mammogram repeat September 2025  - Screening for colon cancer up-to-date  -Screening for depression negative directive reviewed  -Recent blood work reviewed and up-to-date deconditioning slowly improving continue with physical therapy  - Morbid obesity counseled about BMI weight loss  I spent >15minutes minutes face to face with individial providing recommendations for nutrition choices and exercise plan to help achieve weight reduction.    Follow-up  -Abnormal thyroid function test seen by endocrinology Dr. Burnett, levothyroxine adjusted now takes 150 mcg daily patient instructed to use a whole tablet not to cut tablets and use it in empty stomach without any medication for half an hour patient scheduled to repeat thyroid function test in 4 to 6 weeks follow-up endocontinue monitoring closely  - Thyromegaly slowly improving no pressure symptoms  -Morbid obesity counseled about weight loss patient may benefit from [facility] treatment preauthorization obtained patient cannot afford the medication due to high deductible continue with low-carb diet  - afib (s/p ablation, on warfarin), COPD (on 2L continuous oxygen), morbid obesity, CVI, HTN, hypothyroidism and AAA (s/p EVAR 2016) now s/p right renal artery stenting (6x59m Weston VBX), aortic stent graft placement ([id number] Weston aortic cuff x2), bilateral iliac stent graft placement, and right hypogastric artery coil embolization on 1/17/2024  - Renal insufficiency stable continue with current medication Lasix 40 mg twice a day chlorthalidone twice a week.  --Chronic respiratory failure continues 2 L oxygen  - Obstructive sleep apnea continue CPAP.  -- Hypertension controlled continue with carvedilol half tablet twice a day patient controlled no need to go to isosorbide at this time continue monitoring closely  - -Atrial fibrillation controlled to continue with carvedilol blood pressure controlled heart rate  controlled  - Atrial fibrillation patient seen by Coumadin clinic review 1 further follow-up INR closely.  - Hypomagnesemia continue with magnesium tablet daily  - Vitamin D deficiency need to start on vitamin D 5000 daily  - Hypokalemia continue with potassium 40 mg twice a day follow-up BMP for further recommendation  -Follow-up 3 months

## 2025-06-16 ENCOUNTER — ANTICOAGULATION - WARFARIN VISIT (OUTPATIENT)
Dept: PHARMACY | Facility: HOSPITAL | Age: 70
End: 2025-06-16
Payer: MEDICARE

## 2025-06-16 DIAGNOSIS — I48.91 ATRIAL FIBRILLATION, UNSPECIFIED TYPE (MULTI): Primary | ICD-10-CM

## 2025-06-16 NOTE — PROGRESS NOTES
Subjective     Emma Villela is a 70 y.o. female who presents for anticoagulation follow up.     Location: [facility]     Referring Provider: Ti Nolan MD  INR Goal: 2.0-3.0  Indication: Atrial Fibrillation/Atrial Flutter    Current Outpatient Medications on File Prior to Visit   Medication Sig Dispense Refill    acetaminophen (Tylenol) 325 mg tablet Take 1 tablet (325 mg) by mouth every 4 hours if needed.      albuterol 90 mcg/actuation inhaler Inhale 2 puffs every 6 hours if needed.      amitriptyline (Elavil) 25 mg tablet TAKE ONE TABLET BY MOUTH AT BEDTIME 90 tablet 0    aspirin 81 mg EC tablet Take 1 tablet (81 mg) by mouth once daily. 90 tablet 3    atorvastatin (Lipitor) 40 mg tablet TAKE ONE TABLET BY MOUTH DAILY 90 tablet 0    budesonide-formoteroL (Symbicort) 160-4.5 mcg/actuation inhaler Inhale 2 puffs 2 times a day. 10.2 g 11    carvedilol (Coreg) 25 mg tablet Take 0.5 tablets (12.5 mg) by mouth 2 times a day. 180 tablet 0    cholecalciferol (Vitamin D-3) 125 mcg (5,000 units) capsule TAKE ONE CAPSULE BY MOUTH DAILY 90 capsule 0    ferrous gluconate 324 (38 Fe) mg tablet Take 1 tablet (324 mg) by mouth once daily. 90 tablet 1    furosemide (Lasix) 40 mg tablet Take 1 tablet (40 mg) by mouth 2 times a day. 180 tablet 1    hydrALAZINE (Apresoline) 100 mg tablet TAKE ONE TABLET BY MOUTH THREE TIMES A  tablet 0    Jantoven 7.5 mg tablet TAKE ONE TABLET BY MOUTH DAILY 90 tablet 0    levothyroxine (Synthroid, Levoxyl) 150 mcg tablet Take 1 tablet (150 mcg) by mouth early in the morning.. Take on an empty stomach at the same time each day, either 30 to 60 minutes prior to breakfast 90 tablet 4    losartan (Cozaar) 100 mg tablet TAKE ONE TABLET BY MOUTH DAILY 90 tablet 0    magnesium oxide (Mag-Ox) 400 mg (241.3 mg elemental) tablet TAKE ONE TABLET BY MOUTH TWO TIMES A  tablet 0    meclizine (Antivert) 25 mg tablet Take 1 tablet (25 mg) by mouth once  daily at bedtime. PRN dizziness      metOLazone (Zaroxolyn) 5 mg tablet Take 1 tablet (5 mg) by mouth. Twice a week      oxybutynin XL (Ditropan-XL) 15 mg 24 hr tablet Take 1 tablet (15 mg) by mouth once daily.      oxybutynin XL (Ditropan-XL) 5 mg 24 hr tablet TAKE ONE TABLET BY MOUTH EVERY EVENING IN ADDITION TO THE 15 MG XL TABS.      oxygen (O2) gas therapy Inhale 2-5 L/min continuously. 2-3 L/min during the day, 5 L/min with CPAP at night      pantoprazole (ProtoNix) 40 mg EC tablet TAKE ONE TABLET BY MOUTH EVERY DAY 90 tablet 0    potassium chloride CR 20 mEq ER tablet TAKE ONE TABLET BY MOUTH EVERY 12 HOURS 180 tablet 0    [DISCONTINUED] carvedilol (Coreg) 25 mg tablet Take 0.5 tablets (12.5 mg) by mouth 2 times a day. 180 tablet 0    [DISCONTINUED] furosemide (Lasix) 40 mg tablet Take 1 tablet (40 mg) by mouth 2 times a day. 180 tablet 1    [DISCONTINUED] levothyroxine (Synthroid, Levoxyl) 100 mcg tablet Take 1 tablet (100 mcg) by mouth early in the morning.. Take 100 mcg in addition to 300 mcg daily total 400 mcg daily on an empty stomach at the same time each day, either 30 to 60 minutes prior to breakfast 90 tablet 3    [DISCONTINUED] levothyroxine (Synthroid, Unithroid) 300 mcg tablet Take 1 tablet (300 mcg) by mouth early in the morning.. Take 300 mcg tablet in addition to 100 mcg tablet, total 400 mcg daily on an empty stomach at the same time each day, either 30 to 60 minutes prior to breakfast 90 tablet 3    [DISCONTINUED] meclizine (Antivert) 25 mg tablet TAKE ONE TABLET BY MOUTH AT BEDTIME (Patient taking differently: Take 1 tablet (25 mg) by mouth once daily at bedtime. PRN dizziness) 30 tablet 2     No current facility-administered medications on file prior to visit.      Objective   Anticoagulation Summary  As of 2025      INR goal:  2.0-3.0   TTR:  73.1% (1.6 y)   INR used for dosin.60 (2025)   Weekly warfarin total:  48.75 mg             Lab Results   Component Value Date    INR  2.60 (N) 06/16/2025    INR 2.20 (N) 06/10/2025    INR 2.50 (N) 06/02/2025    INR 3.40 (A) 11/21/2024    INR 2.40 (N) 11/07/2024    PROTIME 21.3 (H) 01/21/2024    PROTIME 18.2 (H) 01/20/2024    PROTIME 16.2 (H) 01/19/2024         Assessment/Plan   Current INR is Therapeutic at 2.6. Previous INR was Therapeutic at 2.2. As INR is therapeutic, no changes to warfarin regimen needed. Plan to continue warfarin regimen of 3.75mg on Sundays and 7.5mg all other days.    Follow Up: 1 week    Katja Chisholm, PharmD

## 2025-06-23 ENCOUNTER — ANTICOAGULATION - WARFARIN VISIT (OUTPATIENT)
Dept: PHARMACY | Facility: HOSPITAL | Age: 70
End: 2025-06-23
Payer: MEDICARE

## 2025-06-23 DIAGNOSIS — I48.91 ATRIAL FIBRILLATION, UNSPECIFIED TYPE (MULTI): Primary | ICD-10-CM

## 2025-06-23 NOTE — PROGRESS NOTES
Subjective     Emma Villela is a 70 y.o. female who presents for anticoagulation follow up.     Location: [facility]     Referring Provider: Ti Nolan MD  INR Goal: 2.0-3.0  Indication: Atrial Fibrillation/Atrial Flutter    Current Outpatient Medications on File Prior to Visit   Medication Sig Dispense Refill    acetaminophen (Tylenol) 325 mg tablet Take 1 tablet (325 mg) by mouth every 4 hours if needed.      albuterol 90 mcg/actuation inhaler Inhale 2 puffs every 6 hours if needed.      amitriptyline (Elavil) 25 mg tablet TAKE ONE TABLET BY MOUTH AT BEDTIME 90 tablet 0    aspirin 81 mg EC tablet Take 1 tablet (81 mg) by mouth once daily. 90 tablet 3    atorvastatin (Lipitor) 40 mg tablet TAKE ONE TABLET BY MOUTH DAILY 90 tablet 0    budesonide-formoteroL (Symbicort) 160-4.5 mcg/actuation inhaler Inhale 2 puffs 2 times a day. 10.2 g 11    carvedilol (Coreg) 25 mg tablet Take 0.5 tablets (12.5 mg) by mouth 2 times a day. 180 tablet 0    cholecalciferol (Vitamin D-3) 125 mcg (5,000 units) capsule TAKE ONE CAPSULE BY MOUTH DAILY 90 capsule 0    ferrous gluconate 324 (38 Fe) mg tablet Take 1 tablet (324 mg) by mouth once daily. 90 tablet 1    furosemide (Lasix) 40 mg tablet Take 1 tablet (40 mg) by mouth 2 times a day. 180 tablet 1    hydrALAZINE (Apresoline) 100 mg tablet TAKE ONE TABLET BY MOUTH THREE TIMES A  tablet 0    Jantoven 7.5 mg tablet TAKE ONE TABLET BY MOUTH DAILY 90 tablet 0    levothyroxine (Synthroid, Levoxyl) 150 mcg tablet Take 1 tablet (150 mcg) by mouth early in the morning.. Take on an empty stomach at the same time each day, either 30 to 60 minutes prior to breakfast 90 tablet 4    losartan (Cozaar) 100 mg tablet TAKE ONE TABLET BY MOUTH DAILY 90 tablet 0    magnesium oxide (Mag-Ox) 400 mg (241.3 mg elemental) tablet TAKE ONE TABLET BY MOUTH TWO TIMES A  tablet 0    meclizine (Antivert) 25 mg tablet Take 1 tablet (25 mg) by mouth once  daily at bedtime. PRN dizziness      metOLazone (Zaroxolyn) 5 mg tablet Take 1 tablet (5 mg) by mouth. Twice a week      oxybutynin XL (Ditropan-XL) 15 mg 24 hr tablet Take 1 tablet (15 mg) by mouth once daily.      oxybutynin XL (Ditropan-XL) 5 mg 24 hr tablet TAKE ONE TABLET BY MOUTH EVERY EVENING IN ADDITION TO THE 15 MG XL TABS.      oxygen (O2) gas therapy Inhale 2-5 L/min continuously. 2-3 L/min during the day, 5 L/min with CPAP at night      pantoprazole (ProtoNix) 40 mg EC tablet TAKE ONE TABLET BY MOUTH EVERY DAY 90 tablet 0    potassium chloride CR 20 mEq ER tablet TAKE ONE TABLET BY MOUTH EVERY 12 HOURS 180 tablet 0     No current facility-administered medications on file prior to visit.      Objective   Anticoagulation Summary  As of 2025      INR goal:  2.0-3.0   TTR:  73.4% (1.6 y)   INR used for dosin.40 (2025)   Weekly warfarin total:  48.75 mg             Lab Results   Component Value Date    INR 2.40 (N) 2025    INR 2.60 (N) 2025    INR 2.20 (N) 06/10/2025    INR 3.40 (A) 2024    INR 2.40 (N) 2024    PROTIME 21.3 (H) 2024    PROTIME 18.2 (H) 2024    PROTIME 16.2 (H) 2024         Assessment/Plan   Current INR is Therapeutic at 2.4. Previous INR was Therapeutic at 2.6. As INR is therapeutic, no changes to warfarin regimen needed. Plan to continue warfarin regimen of 3.75mg on Sundays and 7.5mg all other days.    Follow Up: 1 week    Katja Chisholm, PharmD

## 2025-06-30 ENCOUNTER — ANTICOAGULATION - WARFARIN VISIT (OUTPATIENT)
Dept: PHARMACY | Facility: HOSPITAL | Age: 70
End: 2025-06-30
Payer: MEDICARE

## 2025-06-30 DIAGNOSIS — I48.91 ATRIAL FIBRILLATION, UNSPECIFIED TYPE (MULTI): Primary | ICD-10-CM

## 2025-06-30 NOTE — PROGRESS NOTES
Subjective     Emma Villela is a 70 y.o. female who presents for anticoagulation follow up.     Location: [facility]     Referring Provider: Ti Nolan MD  INR Goal: 2.0-3.0  Indication: Atrial Fibrillation/Atrial Flutter    Current Outpatient Medications on File Prior to Visit   Medication Sig Dispense Refill    acetaminophen (Tylenol) 325 mg tablet Take 1 tablet (325 mg) by mouth every 4 hours if needed.      albuterol 90 mcg/actuation inhaler Inhale 2 puffs every 6 hours if needed.      amitriptyline (Elavil) 25 mg tablet TAKE ONE TABLET BY MOUTH AT BEDTIME 90 tablet 0    aspirin 81 mg EC tablet Take 1 tablet (81 mg) by mouth once daily. 90 tablet 3    atorvastatin (Lipitor) 40 mg tablet TAKE ONE TABLET BY MOUTH DAILY 90 tablet 0    budesonide-formoteroL (Symbicort) 160-4.5 mcg/actuation inhaler Inhale 2 puffs 2 times a day. 10.2 g 11    carvedilol (Coreg) 25 mg tablet Take 0.5 tablets (12.5 mg) by mouth 2 times a day. 180 tablet 0    cholecalciferol (Vitamin D-3) 125 mcg (5,000 units) capsule TAKE ONE CAPSULE BY MOUTH DAILY 90 capsule 0    ferrous gluconate 324 (38 Fe) mg tablet Take 1 tablet (324 mg) by mouth once daily. 90 tablet 1    furosemide (Lasix) 40 mg tablet Take 1 tablet (40 mg) by mouth 2 times a day. 180 tablet 1    hydrALAZINE (Apresoline) 100 mg tablet TAKE ONE TABLET BY MOUTH THREE TIMES A  tablet 0    Jantoven 7.5 mg tablet TAKE ONE TABLET BY MOUTH DAILY 90 tablet 0    levothyroxine (Synthroid, Levoxyl) 150 mcg tablet Take 1 tablet (150 mcg) by mouth early in the morning.. Take on an empty stomach at the same time each day, either 30 to 60 minutes prior to breakfast 90 tablet 4    losartan (Cozaar) 100 mg tablet TAKE ONE TABLET BY MOUTH DAILY 90 tablet 0    magnesium oxide (Mag-Ox) 400 mg (241.3 mg elemental) tablet TAKE ONE TABLET BY MOUTH TWO TIMES A  tablet 0    meclizine (Antivert) 25 mg tablet Take 1 tablet (25 mg) by mouth once  daily at bedtime. PRN dizziness      metOLazone (Zaroxolyn) 5 mg tablet Take 1 tablet (5 mg) by mouth. Twice a week      oxybutynin XL (Ditropan-XL) 15 mg 24 hr tablet Take 1 tablet (15 mg) by mouth once daily.      oxybutynin XL (Ditropan-XL) 5 mg 24 hr tablet TAKE ONE TABLET BY MOUTH EVERY EVENING IN ADDITION TO THE 15 MG XL TABS.      oxygen (O2) gas therapy Inhale 2-5 L/min continuously. 2-3 L/min during the day, 5 L/min with CPAP at night      pantoprazole (ProtoNix) 40 mg EC tablet TAKE ONE TABLET BY MOUTH EVERY DAY 90 tablet 0    potassium chloride CR 20 mEq ER tablet TAKE ONE TABLET BY MOUTH EVERY 12 HOURS 180 tablet 0     No current facility-administered medications on file prior to visit.      Objective   Anticoagulation Summary  As of 6/30/2025      INR goal:  2.0-3.0   TTR:  73.4% (1.6 y)   INR used for dosing:  --             Lab Results   Component Value Date    INR 2.40 (N) 06/23/2025    INR 2.60 (N) 06/16/2025    INR 2.20 (N) 06/10/2025    INR 3.40 (A) 11/21/2024    INR 2.40 (N) 11/07/2024    PROTIME 21.3 (H) 01/21/2024    PROTIME 18.2 (H) 01/20/2024    PROTIME 16.2 (H) 01/19/2024         Assessment/Plan   Current INR is Subtherapeutic at 1.8. Previous INR was Therapeutic at 2.4. Patient reports increased intake of vitamin K in the past week. Does not plan to continue. As INR is subtherapeutic advised patient to take 11.25 mg x1 dose today. Then plan to resume current warfarin regimen of 3.75mg on Sundays and 7.5mg all other days.    Follow Up: 1 week    Katja Chisholm, PharmD

## 2025-07-07 ENCOUNTER — ANTICOAGULATION - WARFARIN VISIT (OUTPATIENT)
Dept: PHARMACY | Facility: HOSPITAL | Age: 70
End: 2025-07-07
Payer: MEDICARE

## 2025-07-07 DIAGNOSIS — K21.9 GASTROESOPHAGEAL REFLUX DISEASE, UNSPECIFIED WHETHER ESOPHAGITIS PRESENT: ICD-10-CM

## 2025-07-07 DIAGNOSIS — I48.91 ATRIAL FIBRILLATION, UNSPECIFIED TYPE (MULTI): Primary | ICD-10-CM

## 2025-07-07 NOTE — PROGRESS NOTES
Subjective     Emma Villela is a 70 y.o. female who presents for anticoagulation follow up.     Location: [facility]     Referring Provider: Ti Nolan MD  INR Goal: 2.0-3.0  Indication: Atrial Fibrillation/Atrial Flutter    Current Outpatient Medications on File Prior to Visit   Medication Sig Dispense Refill    acetaminophen (Tylenol) 325 mg tablet Take 1 tablet (325 mg) by mouth every 4 hours if needed.      albuterol 90 mcg/actuation inhaler Inhale 2 puffs every 6 hours if needed.      amitriptyline (Elavil) 25 mg tablet TAKE ONE TABLET BY MOUTH AT BEDTIME 90 tablet 0    aspirin 81 mg EC tablet Take 1 tablet (81 mg) by mouth once daily. 90 tablet 3    atorvastatin (Lipitor) 40 mg tablet TAKE ONE TABLET BY MOUTH DAILY 90 tablet 0    budesonide-formoteroL (Symbicort) 160-4.5 mcg/actuation inhaler Inhale 2 puffs 2 times a day. 10.2 g 11    carvedilol (Coreg) 25 mg tablet Take 0.5 tablets (12.5 mg) by mouth 2 times a day. 180 tablet 0    cholecalciferol (Vitamin D-3) 125 mcg (5,000 units) capsule TAKE ONE CAPSULE BY MOUTH DAILY 90 capsule 0    ferrous gluconate 324 (38 Fe) mg tablet Take 1 tablet (324 mg) by mouth once daily. 90 tablet 1    furosemide (Lasix) 40 mg tablet Take 1 tablet (40 mg) by mouth 2 times a day. 180 tablet 1    hydrALAZINE (Apresoline) 100 mg tablet TAKE ONE TABLET BY MOUTH THREE TIMES A  tablet 0    Jantoven 7.5 mg tablet TAKE ONE TABLET BY MOUTH DAILY 90 tablet 0    levothyroxine (Synthroid, Levoxyl) 150 mcg tablet Take 1 tablet (150 mcg) by mouth early in the morning.. Take on an empty stomach at the same time each day, either 30 to 60 minutes prior to breakfast 90 tablet 4    losartan (Cozaar) 100 mg tablet TAKE ONE TABLET BY MOUTH DAILY 90 tablet 0    magnesium oxide (Mag-Ox) 400 mg (241.3 mg elemental) tablet TAKE ONE TABLET BY MOUTH TWO TIMES A  tablet 0    meclizine (Antivert) 25 mg tablet Take 1 tablet (25 mg) by mouth once  daily at bedtime. PRN dizziness      metOLazone (Zaroxolyn) 5 mg tablet Take 1 tablet (5 mg) by mouth. Twice a week      oxybutynin XL (Ditropan-XL) 15 mg 24 hr tablet Take 1 tablet (15 mg) by mouth once daily.      oxybutynin XL (Ditropan-XL) 5 mg 24 hr tablet TAKE ONE TABLET BY MOUTH EVERY EVENING IN ADDITION TO THE 15 MG XL TABS.      oxygen (O2) gas therapy Inhale 2-5 L/min continuously. 2-3 L/min during the day, 5 L/min with CPAP at night      pantoprazole (ProtoNix) 40 mg EC tablet TAKE ONE TABLET BY MOUTH EVERY DAY 90 tablet 0    potassium chloride CR 20 mEq ER tablet TAKE ONE TABLET BY MOUTH EVERY 12 HOURS 180 tablet 0     No current facility-administered medications on file prior to visit.      Objective   Anticoagulation Summary  As of 2025      INR goal:  2.0-3.0   TTR:  72.5% (1.6 y)   INR used for dosin.90 (2025)   Weekly warfarin total:  52.5 mg             Lab Results   Component Value Date    INR 1.90 (A) 2025    INR 1.80 (A) 2025    INR 2.40 (N) 2025    INR 3.40 (A) 2024    INR 2.40 (N) 2024    PROTIME 21.3 (H) 2024    PROTIME 18.2 (H) 2024    PROTIME 16.2 (H) 2024         Assessment/Plan   Current INR is Subtherapeutic at 1.9. Previous INR was Subtherapeutic at 1.8. Patient reports increased intake of vitamin K in the past week. As INR is subtherapeutic, plan for weekly dose increase. Advised patient to increase current warfarin regimen to 7.5mg every day.     Follow Up: 1 week    Katja Chisholm, PharmD

## 2025-07-08 RX ORDER — PANTOPRAZOLE SODIUM 40 MG/1
40 TABLET, DELAYED RELEASE ORAL DAILY
Qty: 90 TABLET | Refills: 0 | Status: SHIPPED | OUTPATIENT
Start: 2025-07-08

## 2025-07-14 ENCOUNTER — ANTICOAGULATION - WARFARIN VISIT (OUTPATIENT)
Dept: PHARMACY | Facility: HOSPITAL | Age: 70
End: 2025-07-14
Payer: MEDICARE

## 2025-07-14 DIAGNOSIS — I48.91 ATRIAL FIBRILLATION, UNSPECIFIED TYPE (MULTI): Primary | ICD-10-CM

## 2025-07-14 NOTE — PROGRESS NOTES
Subjective     Emma Villela is a 70 y.o. female who presents for anticoagulation follow up.     Location: [facility]     Referring Provider: Ti Nolan MD  INR Goal: 2.0-3.0  Indication: Atrial Fibrillation/Atrial Flutter    Current Outpatient Medications on File Prior to Visit   Medication Sig Dispense Refill    acetaminophen (Tylenol) 325 mg tablet Take 1 tablet (325 mg) by mouth every 4 hours if needed.      albuterol 90 mcg/actuation inhaler Inhale 2 puffs every 6 hours if needed.      amitriptyline (Elavil) 25 mg tablet TAKE ONE TABLET BY MOUTH AT BEDTIME 90 tablet 0    aspirin 81 mg EC tablet Take 1 tablet (81 mg) by mouth once daily. 90 tablet 3    atorvastatin (Lipitor) 40 mg tablet TAKE ONE TABLET BY MOUTH DAILY 90 tablet 0    budesonide-formoteroL (Symbicort) 160-4.5 mcg/actuation inhaler Inhale 2 puffs 2 times a day. 10.2 g 11    carvedilol (Coreg) 25 mg tablet Take 0.5 tablets (12.5 mg) by mouth 2 times a day. 180 tablet 0    cholecalciferol (Vitamin D-3) 125 mcg (5,000 units) capsule TAKE ONE CAPSULE BY MOUTH DAILY 90 capsule 0    ferrous gluconate 324 (38 Fe) mg tablet Take 1 tablet (324 mg) by mouth once daily. 90 tablet 1    furosemide (Lasix) 40 mg tablet Take 1 tablet (40 mg) by mouth 2 times a day. 180 tablet 1    hydrALAZINE (Apresoline) 100 mg tablet TAKE ONE TABLET BY MOUTH THREE TIMES A  tablet 0    Jantoven 7.5 mg tablet TAKE ONE TABLET BY MOUTH DAILY 90 tablet 0    levothyroxine (Synthroid, Levoxyl) 150 mcg tablet Take 1 tablet (150 mcg) by mouth early in the morning.. Take on an empty stomach at the same time each day, either 30 to 60 minutes prior to breakfast 90 tablet 4    losartan (Cozaar) 100 mg tablet TAKE ONE TABLET BY MOUTH DAILY 90 tablet 0    magnesium oxide (Mag-Ox) 400 mg (241.3 mg elemental) tablet TAKE ONE TABLET BY MOUTH TWO TIMES A  tablet 0    meclizine (Antivert) 25 mg tablet Take 1 tablet (25 mg) by mouth once  daily at bedtime. PRN dizziness      metOLazone (Zaroxolyn) 5 mg tablet Take 1 tablet (5 mg) by mouth. Twice a week      oxybutynin XL (Ditropan-XL) 15 mg 24 hr tablet Take 1 tablet (15 mg) by mouth once daily.      oxybutynin XL (Ditropan-XL) 5 mg 24 hr tablet TAKE ONE TABLET BY MOUTH EVERY EVENING IN ADDITION TO THE 15 MG XL TABS.      oxygen (O2) gas therapy Inhale 2-5 L/min continuously. 2-3 L/min during the day, 5 L/min with CPAP at night      pantoprazole (ProtoNix) 40 mg EC tablet TAKE ONE TABLET BY MOUTH EVERY DAY 90 tablet 0    potassium chloride CR 20 mEq ER tablet TAKE ONE TABLET BY MOUTH EVERY 12 HOURS 180 tablet 0    [DISCONTINUED] pantoprazole (ProtoNix) 40 mg EC tablet TAKE ONE TABLET BY MOUTH EVERY DAY 90 tablet 0     No current facility-administered medications on file prior to visit.      Objective   Anticoagulation Summary  As of 7/14/2025      INR goal:  2.0-3.0   TTR:  72.5% (1.6 y)   INR used for dosing:  --             Lab Results   Component Value Date    INR 1.90 (A) 07/07/2025    INR 1.80 (A) 06/30/2025    INR 2.40 (N) 06/23/2025    INR 3.40 (A) 11/21/2024    INR 2.40 (N) 11/07/2024    PROTIME 21.3 (H) 01/21/2024    PROTIME 18.2 (H) 01/20/2024    PROTIME 16.2 (H) 01/19/2024         Assessment/Plan   Current INR is Therapeutic at 2.0. Previous INR was Subtherapeutic at 1.9. As INR is therapeutic, no changes to warfarin regimen needed. Advised patient to continue current warfarin regimen of 7.5mg every day.     Follow Up: 1 week    Katja Chisholm, PharmD

## 2025-07-15 DIAGNOSIS — E01.0 THYROMEGALY: ICD-10-CM

## 2025-07-15 DIAGNOSIS — E03.9 HYPOTHYROIDISM, UNSPECIFIED TYPE: ICD-10-CM

## 2025-07-16 LAB
T4 FREE SERPL-MCNC: 1.5 NG/DL (ref 0.8–1.8)
TSH SERPL-ACNC: 1.69 MIU/L (ref 0.4–4.5)

## 2025-07-21 ENCOUNTER — ANTICOAGULATION - WARFARIN VISIT (OUTPATIENT)
Dept: PHARMACY | Facility: HOSPITAL | Age: 70
End: 2025-07-21
Payer: MEDICARE

## 2025-07-21 DIAGNOSIS — I48.91 ATRIAL FIBRILLATION, UNSPECIFIED TYPE (MULTI): Primary | ICD-10-CM

## 2025-07-21 NOTE — PROGRESS NOTES
Subjective     Emma Villela is a 70 y.o. female who presents for anticoagulation follow up.     Location: [facility]     Referring Provider: Ti Nolan MD  INR Goal: 2.0-3.0  Indication: Atrial Fibrillation/Atrial Flutter    Current Outpatient Medications on File Prior to Visit   Medication Sig Dispense Refill    acetaminophen (Tylenol) 325 mg tablet Take 1 tablet (325 mg) by mouth every 4 hours if needed.      albuterol 90 mcg/actuation inhaler Inhale 2 puffs every 6 hours if needed.      amitriptyline (Elavil) 25 mg tablet TAKE ONE TABLET BY MOUTH AT BEDTIME 90 tablet 0    aspirin 81 mg EC tablet Take 1 tablet (81 mg) by mouth once daily. 90 tablet 3    atorvastatin (Lipitor) 40 mg tablet TAKE ONE TABLET BY MOUTH DAILY 90 tablet 0    budesonide-formoteroL (Symbicort) 160-4.5 mcg/actuation inhaler Inhale 2 puffs 2 times a day. 10.2 g 11    carvedilol (Coreg) 25 mg tablet Take 0.5 tablets (12.5 mg) by mouth 2 times a day. 180 tablet 0    cholecalciferol (Vitamin D-3) 125 mcg (5,000 units) capsule TAKE ONE CAPSULE BY MOUTH DAILY 90 capsule 0    ferrous gluconate 324 (38 Fe) mg tablet Take 1 tablet (324 mg) by mouth once daily. 90 tablet 1    furosemide (Lasix) 40 mg tablet Take 1 tablet (40 mg) by mouth 2 times a day. 180 tablet 1    hydrALAZINE (Apresoline) 100 mg tablet TAKE ONE TABLET BY MOUTH THREE TIMES A  tablet 0    Jantoven 7.5 mg tablet TAKE ONE TABLET BY MOUTH DAILY 90 tablet 0    levothyroxine (Synthroid, Levoxyl) 150 mcg tablet Take 1 tablet (150 mcg) by mouth early in the morning.. Take on an empty stomach at the same time each day, either 30 to 60 minutes prior to breakfast 90 tablet 4    losartan (Cozaar) 100 mg tablet TAKE ONE TABLET BY MOUTH DAILY 90 tablet 0    magnesium oxide (Mag-Ox) 400 mg (241.3 mg elemental) tablet TAKE ONE TABLET BY MOUTH TWO TIMES A  tablet 0    meclizine (Antivert) 25 mg tablet Take 1 tablet (25 mg) by mouth once  daily at bedtime. PRN dizziness      metOLazone (Zaroxolyn) 5 mg tablet Take 1 tablet (5 mg) by mouth. Twice a week      oxybutynin XL (Ditropan-XL) 15 mg 24 hr tablet Take 1 tablet (15 mg) by mouth once daily.      oxybutynin XL (Ditropan-XL) 5 mg 24 hr tablet TAKE ONE TABLET BY MOUTH EVERY EVENING IN ADDITION TO THE 15 MG XL TABS.      oxygen (O2) gas therapy Inhale 2-5 L/min continuously. 2-3 L/min during the day, 5 L/min with CPAP at night      pantoprazole (ProtoNix) 40 mg EC tablet TAKE ONE TABLET BY MOUTH EVERY DAY 90 tablet 0    potassium chloride CR 20 mEq ER tablet TAKE ONE TABLET BY MOUTH EVERY 12 HOURS 180 tablet 0     No current facility-administered medications on file prior to visit.      Objective   Anticoagulation Summary  As of 2025      INR goal:  2.0-3.0   TTR:  70.8% (1.7 y)   INR used for dosin.90 (2025)   Weekly warfarin total:  56.25 mg             Lab Results   Component Value Date    INR 1.90 (A) 2025    INR 2.00 (N) 2025    INR 1.90 (A) 2025    INR 3.40 (A) 2024    INR 2.40 (N) 2024    PROTIME 21.3 (H) 2024    PROTIME 18.2 (H) 2024    PROTIME 16.2 (H) 2024         Assessment/Plan   Current INR is Subtherapeutic at 1.9. Previous INR was Therapeutic at 2.0. No changes reported from previous visit. As INR is subtherapeutic, advised patient to take 11.25 mg x1 dose today. Then plan to increase weekly warfarin regimen to 11.25 mg on  and 7.5 mg all other days.    Follow Up: 1 week, home INR check     Katja Chisholm, NolaD

## 2025-07-22 ENCOUNTER — APPOINTMENT (OUTPATIENT)
Dept: PULMONOLOGY | Facility: CLINIC | Age: 70
End: 2025-07-22
Payer: MEDICARE

## 2025-07-22 ENCOUNTER — TELEPHONE (OUTPATIENT)
Dept: ENDOCRINOLOGY | Facility: HOSPITAL | Age: 70
End: 2025-07-22

## 2025-07-22 NOTE — TELEPHONE ENCOUNTER
Called patient to discuss about lab work results.    Lab Results   Component Value Date    TSH 1.69 07/15/2025    FREET4 1.5 07/15/2025        Recommendation:  Continue on tablet levothyroxine 150 mcg daily, empty stomach and wait for 30 to 40 minutes before taking other medications/food.   Patient advised to follow-up with PCP from now onwards.  Patient advised to contact us anytime in future for thyroid related issues.  Pt understood and gave appropriate teach back about the plan of care. All questions  were answered to the patient's satisfaction.     The patient was discussed with attending Dr. Hortencia Garcia MD  Endocrinology fellow

## 2025-07-24 DIAGNOSIS — K29.90 GASTRODUODENITIS: ICD-10-CM

## 2025-07-24 DIAGNOSIS — E78.00 HYPERCHOLESTEROLEMIA: ICD-10-CM

## 2025-07-24 DIAGNOSIS — E83.42 HYPOMAGNESEMIA: ICD-10-CM

## 2025-07-24 RX ORDER — AMITRIPTYLINE HYDROCHLORIDE 25 MG/1
25 TABLET, FILM COATED ORAL NIGHTLY
Qty: 90 TABLET | Refills: 0 | Status: SHIPPED | OUTPATIENT
Start: 2025-07-24

## 2025-07-24 RX ORDER — LANOLIN ALCOHOL/MO/W.PET/CERES
1 CREAM (GRAM) TOPICAL 2 TIMES DAILY
Qty: 180 TABLET | Refills: 0 | Status: SHIPPED | OUTPATIENT
Start: 2025-07-24

## 2025-07-24 RX ORDER — ATORVASTATIN CALCIUM 40 MG/1
40 TABLET, FILM COATED ORAL DAILY
Qty: 90 TABLET | Refills: 0 | Status: SHIPPED | OUTPATIENT
Start: 2025-07-24

## 2025-07-28 ENCOUNTER — ANTICOAGULATION - WARFARIN VISIT (OUTPATIENT)
Dept: PHARMACY | Facility: HOSPITAL | Age: 70
End: 2025-07-28
Payer: MEDICARE

## 2025-07-28 DIAGNOSIS — I48.91 ATRIAL FIBRILLATION, UNSPECIFIED TYPE (MULTI): Primary | ICD-10-CM

## 2025-07-28 NOTE — PROGRESS NOTES
Subjective     Emma Villela is a 70 y.o. female who presents for anticoagulation follow up.     Location: [facility]     Referring Provider: Ti Nolan MD  INR Goal: 2.0-3.0  Indication: Atrial Fibrillation/Atrial Flutter    Current Outpatient Medications on File Prior to Visit   Medication Sig Dispense Refill    acetaminophen (Tylenol) 325 mg tablet Take 1 tablet (325 mg) by mouth every 4 hours if needed.      albuterol 90 mcg/actuation inhaler Inhale 2 puffs every 6 hours if needed.      amitriptyline (Elavil) 25 mg tablet TAKE ONE TABLET BY MOUTH AT BEDTIME 90 tablet 0    aspirin 81 mg EC tablet Take 1 tablet (81 mg) by mouth once daily. 90 tablet 3    atorvastatin (Lipitor) 40 mg tablet TAKE ONE TABLET BY MOUTH EVERY DAY 90 tablet 0    budesonide-formoteroL (Symbicort) 160-4.5 mcg/actuation inhaler Inhale 2 puffs 2 times a day. 10.2 g 11    carvedilol (Coreg) 25 mg tablet Take 0.5 tablets (12.5 mg) by mouth 2 times a day. 180 tablet 0    cholecalciferol (Vitamin D-3) 125 mcg (5,000 units) capsule TAKE ONE CAPSULE BY MOUTH DAILY 90 capsule 0    ferrous gluconate 324 (38 Fe) mg tablet Take 1 tablet (324 mg) by mouth once daily. 90 tablet 1    furosemide (Lasix) 40 mg tablet Take 1 tablet (40 mg) by mouth 2 times a day. 180 tablet 1    hydrALAZINE (Apresoline) 100 mg tablet TAKE ONE TABLET BY MOUTH THREE TIMES A  tablet 0    Jantoven 7.5 mg tablet TAKE ONE TABLET BY MOUTH DAILY 90 tablet 0    levothyroxine (Synthroid, Levoxyl) 150 mcg tablet Take 1 tablet (150 mcg) by mouth early in the morning.. Take on an empty stomach at the same time each day, either 30 to 60 minutes prior to breakfast 90 tablet 4    losartan (Cozaar) 100 mg tablet TAKE ONE TABLET BY MOUTH DAILY 90 tablet 0    magnesium oxide (Mag-Ox) 400 mg (241.3 mg elemental) tablet TAKE ONE TABLET BY MOUTH TWO TIMES A  tablet 0    meclizine (Antivert) 25 mg tablet Take 1 tablet (25 mg) by mouth once  daily at bedtime. PRN dizziness      metOLazone (Zaroxolyn) 5 mg tablet Take 1 tablet (5 mg) by mouth. Twice a week      oxybutynin XL (Ditropan-XL) 15 mg 24 hr tablet Take 1 tablet (15 mg) by mouth once daily.      oxybutynin XL (Ditropan-XL) 5 mg 24 hr tablet TAKE ONE TABLET BY MOUTH EVERY EVENING IN ADDITION TO THE 15 MG XL TABS.      oxygen (O2) gas therapy Inhale 2-5 L/min continuously. 2-3 L/min during the day, 5 L/min with CPAP at night      pantoprazole (ProtoNix) 40 mg EC tablet TAKE ONE TABLET BY MOUTH EVERY DAY 90 tablet 0    potassium chloride CR 20 mEq ER tablet TAKE ONE TABLET BY MOUTH EVERY 12 HOURS 180 tablet 0    [DISCONTINUED] amitriptyline (Elavil) 25 mg tablet TAKE ONE TABLET BY MOUTH AT BEDTIME 90 tablet 0    [DISCONTINUED] atorvastatin (Lipitor) 40 mg tablet TAKE ONE TABLET BY MOUTH DAILY 90 tablet 0    [DISCONTINUED] magnesium oxide (Mag-Ox) 400 mg (241.3 mg elemental) tablet TAKE ONE TABLET BY MOUTH TWO TIMES A  tablet 0     No current facility-administered medications on file prior to visit.      Objective   Anticoagulation Summary  As of 2025      INR goal:  2.0-3.0   TTR:  70.9% (1.7 y)   INR used for dosin.40 (2025)   Weekly warfarin total:  56.25 mg             Lab Results   Component Value Date    INR 2.40 (N) 2025    INR 1.90 (A) 2025    INR 2.00 (N) 2025    INR 3.40 (A) 2024    INR 2.40 (N) 2024    PROTIME 21.3 (H) 2024    PROTIME 18.2 (H) 2024    PROTIME 16.2 (H) 2024         Assessment/Plan   Current INR is Therapeutic at 2.4. Previous INR was Subtherapeutic at 1.9. No changes reported from previous visit. As INR is therapeutic, no changes to warfarin regimen needed. Plan to continue weekly warfarin regimen of 11.25 mg on  and 7.5 mg all other days.    Follow Up: 1 week, home INR check     Katja Chisholm, PharmD

## 2025-07-30 DIAGNOSIS — E55.9 VITAMIN D DEFICIENCY: ICD-10-CM

## 2025-07-30 DIAGNOSIS — I48.11 LONGSTANDING PERSISTENT ATRIAL FIBRILLATION (MULTI): ICD-10-CM

## 2025-07-31 RX ORDER — WARFARIN SODIUM 7.5 MG/1
7.5 TABLET ORAL
Qty: 90 TABLET | Refills: 0 | Status: SHIPPED | OUTPATIENT
Start: 2025-07-31

## 2025-07-31 RX ORDER — VIT C/E/ZN/COPPR/LUTEIN/ZEAXAN 250MG-90MG
125 CAPSULE ORAL DAILY
Qty: 90 CAPSULE | Refills: 0 | Status: SHIPPED | OUTPATIENT
Start: 2025-07-31

## 2025-08-02 LAB
ANION GAP SERPL CALCULATED.4IONS-SCNC: 10 MMOL/L (CALC) (ref 7–17)
BUN SERPL-MCNC: 22 MG/DL (ref 7–25)
BUN/CREAT SERPL: 18 (CALC) (ref 6–22)
CALCIUM SERPL-MCNC: 10.1 MG/DL (ref 8.6–10.4)
CHLORIDE SERPL-SCNC: 94 MMOL/L (ref 98–110)
CO2 SERPL-SCNC: 33 MMOL/L (ref 20–32)
CREAT SERPL-MCNC: 1.19 MG/DL (ref 0.6–1)
EGFRCR SERPLBLD CKD-EPI 2021: 49 ML/MIN/1.73M2
GLUCOSE SERPL-MCNC: 110 MG/DL (ref 65–139)
POTASSIUM SERPL-SCNC: 3.5 MMOL/L (ref 3.5–5.3)
SODIUM SERPL-SCNC: 137 MMOL/L (ref 135–146)

## 2025-08-04 ENCOUNTER — ANTICOAGULATION - WARFARIN VISIT (OUTPATIENT)
Dept: PHARMACY | Facility: HOSPITAL | Age: 70
End: 2025-08-04
Payer: MEDICARE

## 2025-08-04 DIAGNOSIS — I48.91 ATRIAL FIBRILLATION, UNSPECIFIED TYPE (MULTI): Primary | ICD-10-CM

## 2025-08-04 NOTE — PROGRESS NOTES
Subjective     Emma Villela is a 70 y.o. female who presents for anticoagulation follow up.     Location: [facility]     Referring Provider: Ti Nolan MD  INR Goal: 2.0-3.0  Indication: Atrial Fibrillation/Atrial Flutter    Current Outpatient Medications on File Prior to Visit   Medication Sig Dispense Refill    acetaminophen (Tylenol) 325 mg tablet Take 1 tablet (325 mg) by mouth every 4 hours if needed.      albuterol 90 mcg/actuation inhaler Inhale 2 puffs every 6 hours if needed.      amitriptyline (Elavil) 25 mg tablet TAKE ONE TABLET BY MOUTH AT BEDTIME 90 tablet 0    aspirin 81 mg EC tablet Take 1 tablet (81 mg) by mouth once daily. 90 tablet 3    atorvastatin (Lipitor) 40 mg tablet TAKE ONE TABLET BY MOUTH EVERY DAY 90 tablet 0    budesonide-formoteroL (Symbicort) 160-4.5 mcg/actuation inhaler Inhale 2 puffs 2 times a day. 10.2 g 11    carvedilol (Coreg) 25 mg tablet Take 0.5 tablets (12.5 mg) by mouth 2 times a day. 180 tablet 0    cholecalciferol (Vitamin D-3) 125 mcg (5,000 units) capsule TAKE ONE CAPSULE BY MOUTH EVERY DAY 90 capsule 0    ferrous gluconate 324 (38 Fe) mg tablet Take 1 tablet (324 mg) by mouth once daily. 90 tablet 1    furosemide (Lasix) 40 mg tablet Take 1 tablet (40 mg) by mouth 2 times a day. 180 tablet 1    hydrALAZINE (Apresoline) 100 mg tablet TAKE ONE TABLET BY MOUTH THREE TIMES A  tablet 0    Jantoven 7.5 mg tablet TAKE ONE TABLET BY MOUTH DAILY 90 tablet 0    levothyroxine (Synthroid, Levoxyl) 150 mcg tablet Take 1 tablet (150 mcg) by mouth early in the morning.. Take on an empty stomach at the same time each day, either 30 to 60 minutes prior to breakfast 90 tablet 4    losartan (Cozaar) 100 mg tablet TAKE ONE TABLET BY MOUTH DAILY 90 tablet 0    magnesium oxide (Mag-Ox) 400 mg (241.3 mg elemental) tablet TAKE ONE TABLET BY MOUTH TWO TIMES A  tablet 0    meclizine (Antivert) 25 mg tablet Take 1 tablet (25 mg) by mouth  once daily at bedtime. PRN dizziness      metOLazone (Zaroxolyn) 5 mg tablet Take 1 tablet (5 mg) by mouth. Twice a week      oxybutynin XL (Ditropan-XL) 15 mg 24 hr tablet Take 1 tablet (15 mg) by mouth once daily.      oxybutynin XL (Ditropan-XL) 5 mg 24 hr tablet TAKE ONE TABLET BY MOUTH EVERY EVENING IN ADDITION TO THE 15 MG XL TABS.      oxygen (O2) gas therapy Inhale 2-5 L/min continuously. 2-3 L/min during the day, 5 L/min with CPAP at night      pantoprazole (ProtoNix) 40 mg EC tablet TAKE ONE TABLET BY MOUTH EVERY DAY 90 tablet 0    potassium chloride CR 20 mEq ER tablet TAKE ONE TABLET BY MOUTH EVERY 12 HOURS 180 tablet 0    [DISCONTINUED] cholecalciferol (Vitamin D-3) 125 mcg (5,000 units) capsule TAKE ONE CAPSULE BY MOUTH DAILY 90 capsule 0    [DISCONTINUED] Jantoven 7.5 mg tablet TAKE ONE TABLET BY MOUTH DAILY 90 tablet 0     No current facility-administered medications on file prior to visit.      Objective   Anticoagulation Summary  As of 2025      INR goal:  2.0-3.0   TTR:  71.3% (1.7 y)   INR used for dosin.50 (2025)   Weekly warfarin total:  56.25 mg             Lab Results   Component Value Date    INR 2.50 (N) 2025    INR 2.40 (N) 2025    INR 1.90 (A) 2025    INR 3.40 (A) 2024    INR 2.40 (N) 2024    PROTIME 21.3 (H) 2024    PROTIME 18.2 (H) 2024    PROTIME 16.2 (H) 2024         Assessment/Plan   Current INR is Therapeutic at 2.5. Previous INR was Therapeutic at 2.4. No changes reported from previous visit. As INR is therapeutic, no changes to warfarin regimen needed. Plan to continue weekly warfarin regimen of 11.25 mg on  and 7.5 mg all other days.    Follow Up: 1 week, home INR check     Katja Chisholm, NolaD

## 2025-08-11 ENCOUNTER — ANTICOAGULATION - WARFARIN VISIT (OUTPATIENT)
Dept: PHARMACY | Facility: HOSPITAL | Age: 70
End: 2025-08-11
Payer: MEDICARE

## 2025-08-11 DIAGNOSIS — I48.91 ATRIAL FIBRILLATION, UNSPECIFIED TYPE (MULTI): Primary | ICD-10-CM

## 2025-08-18 ENCOUNTER — ANTICOAGULATION - WARFARIN VISIT (OUTPATIENT)
Dept: PHARMACY | Facility: HOSPITAL | Age: 70
End: 2025-08-18
Payer: MEDICARE

## 2025-08-18 DIAGNOSIS — I48.91 ATRIAL FIBRILLATION, UNSPECIFIED TYPE (MULTI): Primary | ICD-10-CM

## 2025-08-20 ENCOUNTER — HOSPITAL ENCOUNTER (OUTPATIENT)
Dept: RADIOLOGY | Facility: HOSPITAL | Age: 70
Discharge: HOME | End: 2025-08-20
Payer: MEDICARE

## 2025-08-20 DIAGNOSIS — E87.6 HYPOKALEMIA: ICD-10-CM

## 2025-08-20 DIAGNOSIS — I71.42 JUXTARENAL ABDOMINAL AORTIC ANEURYSM (AAA) WITHOUT RUPTURE: ICD-10-CM

## 2025-08-20 PROCEDURE — 74174 CTA ABD&PLVS W/CONTRAST: CPT

## 2025-08-20 PROCEDURE — 74174 CTA ABD&PLVS W/CONTRAST: CPT | Performed by: RADIOLOGY

## 2025-08-20 PROCEDURE — 2550000001 HC RX 255 CONTRASTS: Performed by: SURGERY

## 2025-08-20 RX ADMIN — IOHEXOL 100 ML: 350 INJECTION, SOLUTION INTRAVENOUS at 10:22

## 2025-08-21 RX ORDER — POTASSIUM CHLORIDE 1500 MG/1
20 TABLET, EXTENDED RELEASE ORAL EVERY 12 HOURS
Qty: 180 TABLET | Refills: 0 | Status: SHIPPED | OUTPATIENT
Start: 2025-08-21

## 2025-08-24 DIAGNOSIS — I10 BENIGN ESSENTIAL HYPERTENSION: ICD-10-CM

## 2025-08-25 ENCOUNTER — ANTICOAGULATION - WARFARIN VISIT (OUTPATIENT)
Dept: PHARMACY | Facility: HOSPITAL | Age: 70
End: 2025-08-25
Payer: MEDICARE

## 2025-08-25 DIAGNOSIS — I48.91 ATRIAL FIBRILLATION, UNSPECIFIED TYPE (MULTI): Primary | ICD-10-CM

## 2025-08-25 RX ORDER — LOSARTAN POTASSIUM 100 MG/1
100 TABLET ORAL DAILY
Qty: 90 TABLET | Refills: 0 | Status: SHIPPED | OUTPATIENT
Start: 2025-08-25

## 2025-09-03 ENCOUNTER — APPOINTMENT (OUTPATIENT)
Dept: VASCULAR SURGERY | Facility: HOSPITAL | Age: 70
End: 2025-09-03
Payer: MEDICARE

## 2025-09-03 ENCOUNTER — ANTICOAGULATION - WARFARIN VISIT (OUTPATIENT)
Dept: PHARMACY | Facility: HOSPITAL | Age: 70
End: 2025-09-03

## 2025-09-03 ENCOUNTER — TELEMEDICINE (OUTPATIENT)
Dept: VASCULAR SURGERY | Facility: CLINIC | Age: 70
End: 2025-09-03
Payer: MEDICARE

## 2025-09-03 DIAGNOSIS — I48.91 ATRIAL FIBRILLATION, UNSPECIFIED TYPE (MULTI): Primary | ICD-10-CM

## 2025-09-03 DIAGNOSIS — I71.41 PARARENAL ABDOMINAL AORTIC ANEURYSM (AAA) WITHOUT RUPTURE: Primary | ICD-10-CM

## 2025-09-03 LAB
INR IN PPP BY COAGULATION ASSAY EXTERNAL: 4.1 (ref 2–3)
PROTHROMBIN TIME (PT) IN PPP BY COAGULATION ASSAY EXTERNAL: ABNORMAL

## 2025-09-03 PROCEDURE — 1159F MED LIST DOCD IN RCRD: CPT | Performed by: SURGERY

## 2025-09-03 PROCEDURE — 99213 OFFICE O/P EST LOW 20 MIN: CPT | Performed by: SURGERY

## 2025-09-03 ASSESSMENT — COLUMBIA-SUICIDE SEVERITY RATING SCALE - C-SSRS
6. HAVE YOU EVER DONE ANYTHING, STARTED TO DO ANYTHING, OR PREPARED TO DO ANYTHING TO END YOUR LIFE?: NO
2. HAVE YOU ACTUALLY HAD ANY THOUGHTS OF KILLING YOURSELF?: NO
1. IN THE PAST MONTH, HAVE YOU WISHED YOU WERE DEAD OR WISHED YOU COULD GO TO SLEEP AND NOT WAKE UP?: NO

## 2025-09-03 ASSESSMENT — ENCOUNTER SYMPTOMS
DEPRESSION: 0
LOSS OF SENSATION IN FEET: 0
OCCASIONAL FEELINGS OF UNSTEADINESS: 0

## 2025-09-05 ENCOUNTER — APPOINTMENT (OUTPATIENT)
Dept: PULMONOLOGY | Facility: CLINIC | Age: 70
End: 2025-09-05
Payer: MEDICARE

## 2025-09-15 ENCOUNTER — APPOINTMENT (OUTPATIENT)
Dept: PRIMARY CARE | Facility: CLINIC | Age: 70
End: 2025-09-15
Payer: MEDICARE

## 2025-09-25 ENCOUNTER — APPOINTMENT (OUTPATIENT)
Dept: SLEEP MEDICINE | Facility: CLINIC | Age: 70
End: 2025-09-25
Payer: MEDICARE

## 2025-10-04 ENCOUNTER — APPOINTMENT (OUTPATIENT)
Dept: RADIOLOGY | Facility: HOSPITAL | Age: 70
End: 2025-10-04
Payer: MEDICARE

## 2025-10-20 ENCOUNTER — APPOINTMENT (OUTPATIENT)
Dept: PULMONOLOGY | Facility: CLINIC | Age: 70
End: 2025-10-20
Payer: MEDICARE

## 2025-12-03 ENCOUNTER — APPOINTMENT (OUTPATIENT)
Dept: SLEEP MEDICINE | Facility: CLINIC | Age: 70
End: 2025-12-03
Payer: MEDICARE

## (undated) DEVICE — Device

## (undated) DEVICE — GUIDEWIRE, STRAIGHT, AMPLATZ SUPER STIFF, 0.035 IN X 180 CM

## (undated) DEVICE — GUIDEWIRE, EXCHANGE, HEAVY DUTY, CURVED, COATED, ROSEN, 0.035 IN X 260 CM

## (undated) DEVICE — PROTECTOR, NERVE, ULNAR, PINK

## (undated) DEVICE — STRAP, CIRCUMFERENTIAL, 2 X 76""

## (undated) DEVICE — SUTURE, SILK, 0, 24 IN, SUTUPAK, BLACK

## (undated) DEVICE — SHEATH, PINNACLE, 10 CM,  5FR INTRODUCER, 5FR DIA, 2.5 CM DIALATOR

## (undated) DEVICE — SUTURE, PDSII, 1, TP-1, VIL, MONO, 48LP

## (undated) DEVICE — INTRODUCER SET, MICROPUNCT, STIFF, 4FR 10CM,PLATINUM TIP,NITINOLWIRE

## (undated) DEVICE — INSERT, CLAMP, SURGICAL, SOFT/TRACTION, STEALTH, 1 MM

## (undated) DEVICE — COVER, CART, 45 X 27 X 48 IN, CLEAR

## (undated) DEVICE — SUTURE, MONOCRYL, 4-0, 18 IN, PS2, UNDYED

## (undated) DEVICE — HANDLE, DETACHMENT, RUBY COIL

## (undated) DEVICE — SUTURE, PROLENE, 6-0, 30 IN, BV-1 BV-1

## (undated) DEVICE — GUIDEWIRE, ANGLE TIP,  .035 DIA, 260 CM, 3 CM TIP"

## (undated) DEVICE — SHEATH, INTRODUCER, DRYSEAL FLEX, 18 FR X 33CM

## (undated) DEVICE — MICROCATHETER, DELIVERY, LANTERN, 45 DEG, 0.025 X 135CM

## (undated) DEVICE — GUIDEWIRE, HI-TORQUE, COMMAND ES, 0.014 X 300CM

## (undated) DEVICE — SUTURE, SILK, 2-0, 30 IN, SH, BLACK

## (undated) DEVICE — SUTURE, PROLENE, 3-0, 48 IN, SH, DA, BLUE

## (undated) DEVICE — COVER PROBE, SOFT FLEX W/ GEL, 5 X 48 IN (13X122CM)

## (undated) DEVICE — GLOVE, SURGICAL, PROTEXIS PI MICRO, 6.5, PF, LF

## (undated) DEVICE — SHEATH, INTRODUCER, DRYSEAL FLEX, 14 FR X 33CM

## (undated) DEVICE — SHEATH, PINNACLE, W/O GUIDEWIRE, 10 CM,  9FR INTRODUCER, 9FR DIA, 2.5 CM DIALATOR

## (undated) DEVICE — CATHETER, 5 FR. 100CM, ANGLE TAPER AT TIP

## (undated) DEVICE — COVER HANDLE LIGHT, STERIS, BLUE, STERILE

## (undated) DEVICE — GLOVE, SURGICAL, PROTEXIS PI MICRO, 7.0, PF, LF

## (undated) DEVICE — DRAPE, TIBURON, SPLIT SHEET, REINF ADH STRIP, 77X122

## (undated) DEVICE — GUIDEWIRE, STRAIGHT, SUPER STIFF, AMPLATZ, 0.035 IN X 260 CM

## (undated) DEVICE — GUIDEWIRE, .035 X 180 BENTSON STR

## (undated) DEVICE — TORQUE DEVICE, ACCOMODATES WIRES W/DIA .010 TO .038"."

## (undated) DEVICE — SYRINGE, 60 CC, LUER LOCK, MONOJECT, W/O CAP, LF

## (undated) DEVICE — SHEATH, INTRODUCER, DRYSEAL FLEX, 12 FR X 33CM

## (undated) DEVICE — ADHESIVE, SKIN, LIQUIBAND EXCEED

## (undated) DEVICE — MANIFOLD, 4 PORT NEPTUNE STANDARD

## (undated) DEVICE — SUTURE, VICRYL, 3-0, 27 IN, SH

## (undated) DEVICE — CATHETER, BALLOON, MOLDING AND OCCULSION, 10-37MM

## (undated) DEVICE — DRAPE, INCISE, ANTIMICROBIAL, IOBAN 2, STERI DRAPE, 23 X 33 IN, DISPOSABLE, STERILE

## (undated) DEVICE — CATHETER, ACCU-VU SIZING, PIGTAIL, 5FR X 100CM 0.035IN, 20CM SEG

## (undated) DEVICE — GLOVE, SURGICAL, PROTEXIS PI MICRO, 6.0, PF, LF

## (undated) DEVICE — SUTURE, PROLENE, 6-0, 30 IN, C-1, CV-11, BLUE

## (undated) DEVICE — SUTURE, PROLENE, 5-0, 36 IN, C-1, CV-11, BLUE

## (undated) DEVICE — SHEATH, PINNACLE, 10 CM,  6FR INTRODUCER, 6FR DIA, 2.5 CM DIALATOR

## (undated) DEVICE — SUTURE, SILK, 2-0, TIES, 12-30 IN, BLACK

## (undated) DEVICE — DEVICE, CLOSURE, PERCLOSE, PROSTYLE

## (undated) DEVICE — DEVICE, INFLATION, ENCORE 20

## (undated) DEVICE — GUIDEWIRE, STIFF SHAFT, ANGLE TIP, .035 DIA, 180 CM,  3 CM TIP"